# Patient Record
Sex: FEMALE | Race: WHITE | NOT HISPANIC OR LATINO | Employment: OTHER | URBAN - METROPOLITAN AREA
[De-identification: names, ages, dates, MRNs, and addresses within clinical notes are randomized per-mention and may not be internally consistent; named-entity substitution may affect disease eponyms.]

---

## 2017-01-30 ENCOUNTER — OFFICE VISIT (OUTPATIENT)
Dept: OPTOMETRY | Facility: CLINIC | Age: 72
End: 2017-01-30
Payer: COMMERCIAL

## 2017-01-30 DIAGNOSIS — H25.13 NUCLEAR SCLEROTIC CATARACT OF BOTH EYES: Primary | ICD-10-CM

## 2017-01-30 DIAGNOSIS — H52.203 ASTIGMATISM OF BOTH EYES: ICD-10-CM

## 2017-01-30 DIAGNOSIS — H52.4 PRESBYOPIA: ICD-10-CM

## 2017-01-30 DIAGNOSIS — H52.13 MYOPIA OF BOTH EYES: ICD-10-CM

## 2017-01-30 PROCEDURE — 92014 COMPRE OPH EXAM EST PT 1/>: CPT | Performed by: OPTOMETRIST

## 2017-01-30 PROCEDURE — 92015 DETERMINE REFRACTIVE STATE: CPT | Performed by: OPTOMETRIST

## 2017-01-30 ASSESSMENT — REFRACTION_MANIFEST
OS_AXIS: 010
METHOD_AUTOREFRACTION: 1
OS_SPHERE: -14.25
OS_CYLINDER: +2.75
OD_AXIS: 010
OD_AXIS: 14
OD_CYLINDER: +2.00
OD_SPHERE: -13.50
OD_CYLINDER: +2.25
OD_ADD: +2.50
OS_ADD: +2.50
OD_SPHERE: -12.00
OS_AXIS: 9
OS_CYLINDER: +1.75
OS_SPHERE: -14.50

## 2017-01-30 ASSESSMENT — VISUAL ACUITY
CORRECTION_TYPE: GLASSES
OD_PH_CC: 20/100-1
OS_PH_CC: 20/80-1
OS_CC: 20/150
OD_CC: 20/200
METHOD: SNELLEN - LINEAR
OS_CC: 20/40-1
OD_CC: 20/40-1
OD_CC+: -1

## 2017-01-30 ASSESSMENT — REFRACTION_WEARINGRX
OS_ADD: +2.50
OD_SPHERE: -10.50
OS_CYLINDER: +2.75
OD_CYLINDER: +2.00
OD_AXIS: 007
SPECS_TYPE: PAL
OS_SPHERE: -13.75
OD_ADD: +2.50
OS_AXIS: 020

## 2017-01-30 ASSESSMENT — CUP TO DISC RATIO
OD_RATIO: 0.5
OS_RATIO: 0.2

## 2017-01-30 ASSESSMENT — KERATOMETRY
OS_K1POWER_DIOPTERS: 44.50
OD_K1POWER_DIOPTERS: 44.75
OS_AXISANGLE2_DEGREES: 179
OD_AXISANGLE2_DEGREES: 12
OD_K2POWER_DIOPTERS: 44.25
OS_K2POWER_DIOPTERS: 43.75

## 2017-01-30 ASSESSMENT — EXTERNAL EXAM - LEFT EYE: OS_EXAM: NORMAL

## 2017-01-30 ASSESSMENT — TONOMETRY
OS_IOP_MMHG: 20
OD_IOP_MMHG: 20
IOP_METHOD: APPLANATION

## 2017-01-30 ASSESSMENT — CONF VISUAL FIELD
OS_NORMAL: 1
OD_NORMAL: 1

## 2017-01-30 ASSESSMENT — SLIT LAMP EXAM - LIDS
COMMENTS: NORMAL
COMMENTS: NORMAL

## 2017-01-30 ASSESSMENT — EXTERNAL EXAM - RIGHT EYE: OD_EXAM: NORMAL

## 2017-01-30 NOTE — PATIENT INSTRUCTIONS
Patient was advised of today's exam findings.  Poor vision due to cataracts  Refer to [facility] for cataract evaluation   Their office will call you to schedule appointment.   Please call [phone] if you have not heard from them within 1 week.    Emani Mosquera and Blake  [facility]  [address] [place] [address]. AUGIE Interiano [id number] [phone]    Sindhu Encinas O.D.  [facility]   [address] [place], [place] [address] [phone]

## 2017-01-30 NOTE — MR AVS SNAPSHOT
After Visit Summary   1/30/2017    Kelsea Valentine    MRN: [id number]           Patient Information     Date Of Birth          1945        Visit Information        Provider Department      1/30/2017 11:30 AM Sindhu Encinas OD [facility]        Today's Diagnoses     Presbyopia    -  1       Care Instructions    Patient was advised of today's exam findings.  Poor vision due to cataracts  Refer to [facility] for cataract evaluation   Their office will call you to schedule appointment.   Please call [phone] if you have not heard from them within 1 week.    Emani Mosquera and Blake  [facility]  [address]  [place], [place] [address] [phone]    Sindhu Encinas O.D.  [facility]   [address] [place], [place] [address] [phone]              Follow-ups after your visit        Your next 10 appointments already scheduled     Mar 21, 2017  8:00 AM   LAB with AN LAB   [facility] ([facility]    [address] [facility] [address] [phone]           Patient must bring picture ID.  Patient should be prepared to give a urine specimen  Please do not eat 10-12 hours before your appointment if you are coming in fasting for labs on lipids, cholesterol, or glucose (sugar).  Pregnant women should follow their Care Team instructions. Water with medications is okay. Do not drink coffee or other fluids.   If you have concerns about taking  your medications, please ask at office or if scheduling via [employer], send a message by clicking on Secure Messaging, Message Your Care Team.            Mar 28, 2017 10:15 AM   SHORT with Maritza Lopez MD   [facility]    [address] [facility] [address] [phone]              Who to contact     If you have questions or need follow up information about today's clinic visit or your  "schedule please contact [facility] directly at [phone].  Normal or non-critical lab and imaging results will be communicated to you by MyChart, letter or phone within 4 business days after the clinic has received the results. If you do not hear from us within 7 days, please contact the clinic through [employer] or phone. If you have a critical or abnormal lab result, we will notify you by phone as soon as possible.  Submit refill requests through [employer] or call your pharmacy and they will forward the refill request to us. Please allow 3 business days for your refill to be completed.          Additional Information About Your Visit        [employer] Information     [employer] lets you send messages to your doctor, view your test results, renew your prescriptions, schedule appointments and more. To sign up, go to [email or web address] . Click on \"Log in\" on the left side of the screen, which will take you to the Welcome page. Then click on \"Sign up Now\" on the right side of the page.     You will be asked to enter the access code listed below, as well as some personal information. Please follow the directions to create your username and password.     Your access code is: [email or web address]  Expires: 2017 12:48 PM     Your access code will  in 90 days. If you need help or a new code, please call your [facility] clinic or [phone].        Care EveryWhere ID     This is your Care EveryWhere ID. This could be used by other organizations to access your [facility] medical records  [id number]         Blood Pressure from Last 3 Encounters:   16 138/76   16 130/75   09/30/15 130/78    Weight from Last 3 Encounters:   16 75.751 kg (167 lb)   16 79.833 kg (176 lb)   09/30/15 81.194 kg (179 lb)              Today, you had the following     No orders found for display       Primary Care Provider Office Phone # Fax #    Maritza Lopez [phone]       [facility] [facility] [address] [facility] [address]        Thank you!     Thank you for choosing [facility]  for your care. Our goal is always to provide you with excellent care. Hearing back from our patients is one way we can continue to improve our services. Please take a few minutes to complete the written survey that you may receive in the mail after your visit with us. Thank you!             Your Updated Medication List - Protect others around you: Learn how to safely use, store and throw away your medicines at www.disposemymeds.org.          This list is accurate as of: 1/30/17 12:48 PM.  Always use your most recent med list.                   Brand Name Dispense Instructions for use    albuterol 108 (90 BASE) MCG/ACT Inhaler    PROAIR HFA/PROVENTIL HFA/VENTOLIN HFA    1 Inhaler    Inhale 2 puffs into the lungs every 6 hours       amLODIPine 5 MG tablet    NORVASC    90 tablet    Take 1 tablet (5 mg) by mouth daily       atorvastatin 20 MG tablet    LIPITOR    90 tablet    Take 1 tablet (20 mg) by mouth daily       benzonatate 200 MG capsule    TESSALON    90 capsule    Take 1 capsule (200 mg) by mouth 3 times daily as needed for cough       CALCIUM 600/VITAMIN D PO      1 everyday       CLARITIN 10 MG tablet   Generic drug:  loratadine      1 TABLET DAILY       FISH OIL PO      one everyday       GLUCOSAMINE 1500 COMPLEX PO      one everyday       irbesartan 300 MG tablet    AVAPRO    90 tablet    Take 1 tablet (300 mg) by mouth daily       metoprolol 100 MG 24 hr tablet    TOPROL-XL    90 tablet    Take 1 tablet (100 mg) by mouth daily       omeprazole 20 MG tablet     30 tablet    Take 1 tablet by mouth daily. Take 30-60 minutes before a meal.       ONE-A-DAY WEIGHT SMART ADVANCE PO      1 every other day       TYLENOL CAPS 500 MG OR      1 CAPSULE EVERY 4 HOURS AS NEEDED       umeclidinium 62.5 MCG/INH oral inhaler    INCRUSE ELLIPTA    90 each    Inhale 1 puff (62.5 mcg) into the lungs  daily       VITAMIN C PO      1 everyday       vitamin D 1000 UNITS capsule      Take 1 capsule by mouth daily.

## 2017-01-30 NOTE — PROGRESS NOTES
Chief Complaint   Patient presents with     COMPREHENSIVE EYE EXAM     wants DMV form filled out      Accompanied by . Also has DMV Forms to be filled out    Last Eye Exam: 2 years ago at [facility]  Dilated Previously: Yes    What are you currently using to see?  Glasses, wears glasses all of the time        Distance Vision Acuity: No changes, does fine with glasses, also mentioned that recently she's noticed a halo around lighting     Near Vision Acuity: Satisfied with vision while reading and using computer with glasses    Eye Comfort: good  Do you use eye drops? : No, mentioned that she is allergic to some of them.   Occupation or Hobbies: Retired     [employer] Assistant          Medical, surgical and family histories reviewed and updated 1/30/2017.       OBJECTIVE: See Ophthalmology exam    ASSESSMENT:    ICD-10-CM    1. Nuclear sclerotic cataract of both eyes H25.13 EYE EXAM (SIMPLE-NONBILLABLE)     REFRACTIVE STATUS     OPHTHALMOLOGY ADULT REFERRAL     OPHTHALMOLOGY ADULT REFERRAL   2. Presbyopia H52.4 EYE EXAM (SIMPLE-NONBILLABLE)     REFRACTIVE STATUS   3. Myopia of both eyes H52.13 EYE EXAM (SIMPLE-NONBILLABLE)     REFRACTIVE STATUS   4. Astigmatism of both eyes H52.203 EYE EXAM (SIMPLE-NONBILLABLE)     REFRACTIVE STATUS      PLAN:     Patient Instructions   Patient was advised of today's exam findings.  Poor vision due to cataracts  Refer to [facility] [place] for cataract evaluation   Their office will call you to schedule appointment.   Please call [phone] if you have not heard from them within 1 week.    Emani Mosquera and Blake  [facility]  [address] [place] [address] [phone]    Sindhu Encinas O.D.  [place] [address] [place] [address] [phone]

## 2017-02-22 ENCOUNTER — OFFICE VISIT (OUTPATIENT)
Dept: OPHTHALMOLOGY | Facility: CLINIC | Age: 72
End: 2017-02-22
Payer: COMMERCIAL

## 2017-02-22 DIAGNOSIS — H26.9 CATARACT: Primary | ICD-10-CM

## 2017-02-22 PROCEDURE — 92014 COMPRE OPH EXAM EST PT 1/>: CPT | Performed by: OPHTHALMOLOGY

## 2017-02-22 ASSESSMENT — VISUAL ACUITY
OD_CC: 20/200
METHOD: SNELLEN - LINEAR
OS_CC: 20/200

## 2017-02-22 ASSESSMENT — TONOMETRY
OD_IOP_MMHG: 18
OS_IOP_MMHG: 18
IOP_METHOD: TONOPEN

## 2017-02-22 ASSESSMENT — EXTERNAL EXAM - LEFT EYE: OS_EXAM: GOOD ORBIT, PROLAPSED FAT PADS: UPPER, LOWER

## 2017-02-22 ASSESSMENT — CUP TO DISC RATIO
OS_RATIO: 0.2
OD_RATIO: 0.5

## 2017-02-22 ASSESSMENT — REFRACTION_WEARINGRX
OD_ADD: +2.50
OS_CYLINDER: +2.75
OS_ADD: +2.50
OS_SPHERE: -13.75
SPECS_TYPE: PAL
OD_CYLINDER: +2.00
OD_AXIS: 007
OD_SPHERE: -10.50
OS_AXIS: 020

## 2017-02-22 ASSESSMENT — REFRACTION_MANIFEST
OD_AXIS: 010
OS_SPHERE: -14.50
OS_CYLINDER: +2.75
OS_AXIS: 010
OD_SPHERE: -12.00
OD_CYLINDER: +2.00

## 2017-02-22 ASSESSMENT — EXTERNAL EXAM - RIGHT EYE: OD_EXAM: GOOD ORBIT, PROLAPSED FAT PADS: UPPER, LOWER

## 2017-02-22 NOTE — PROGRESS NOTES
Current Eye Medications:  no     Subjective:  Cataract evaluation referred by Dr. Encinas.  Pt reports her vision has been getting gradually blurrier.    GFT.  RH.  Left eye dominant (confirmed in office).     Objective:  See Ophthalmology Exam.       Assessment:  Visually significant cataract both eyes.      Plan: Will proceed with cataract surgery left eye first; likely right eye thereafter.  See Patient Instructions.

## 2017-02-22 NOTE — MR AVS SNAPSHOT
After Visit Summary   2/22/2017    Kelsea Valentine    MRN: [id number]           Patient Information     Date Of Birth          1945        Visit Information        Provider Department      2/22/2017 3:45 PM Davi Mosquera MD [facility]        Today's Diagnoses     Cataract, mod, ou    -  1      Care Instructions    We will call to schedule cataract surgery.  Davi Mosquera M.D.          Follow-ups after your visit        Your next 10 appointments already scheduled     Feb 28, 2017  2:45 PM CST   [facility]    [address] [address] [address]   [phone]            Mar 01, 2017  8:55 AM CST   Pre-Op physical with Maritza Lopez MD   [facility]    [address] [facility] [address]   [phone]            Mar 07, 2017  7:45 AM CST   Return Visit with Davi Mosquera MD   [facility] ([facility])    [address] [place] [address]   [phone]            Mar 14, 2017  3:45 PM CDT   Return Visit with Davi Mosquera MD   [facility] ([facility])    [address] [place] [address]   [phone]            Mar 21, 2017  8:00 AM CDT   LAB with AN LAB   [facility]    [address] [facility] [address]   [phone]           Patient must bring picture ID.  Patient should be prepared to give a urine specimen  Please do not eat 10-12 hours before your appointment if you are coming in fasting for labs on lipids, cholesterol, or glucose (sugar).  Pregnant women should follow their Care Team instructions. Water with medications is okay. Do not drink coffee or other fluids.   If you have concerns about taking  your medications, please ask at office or if scheduling via [employer], send a message by clicking on  "Secure Messaging, Message Your Care Team.            Mar 28, 2017 10:15 AM CDT   SHORT with [facility]za Lopez MD   [facility]    [address] [facility] [address] [phone]            2017  9:15 AM CDT   Return Visit with Davi Mosquera MD   [facility]    [address] [facility] [address] [address] [phone]              Who to contact     If you have questions or need follow up information about today's clinic visit or your schedule please contact [facility] directly at [phone].  Normal or non-critical lab and imaging results will be communicated to you by MyChart, letter or phone within 4 business days after the clinic has received the results. If you do not hear from us within 7 days, please contact the clinic through MyChart or phone. If you have a critical or abnormal lab result, we will notify you by phone as soon as possible.  Submit refill requests through [employer] or call your pharmacy and they will forward the refill request to us. Please allow 3 business days for your refill to be completed.          Additional Information About Your Visit        [employer] Information     [employer] lets you send messages to your doctor, view your test results, renew your prescriptions, schedule appointments and more. To sign up, go to [email or web address] . Click on \"Log in\" on the left side of the screen, which will take you to the Welcome page. Then click on \"Sign up Now\" on the right side of the page.     You will be asked to enter the access code listed below, as well as some personal information. Please follow the directions to create your username and password.     Your access code is: [email or web address]  Expires: 2017 12:48 PM     Your access code will  in 90 days. If you need help or a new code, please call your [facility] clinic or [phone].        Care EveryWhere ID     This " is your Care EveryWhere ID. This could be used by other organizations to access your [facility] medical records  [id number]         Blood Pressure from Last 3 Encounters:   09/27/16 138/76   03/29/16 130/75   09/30/15 130/78    Weight from Last 3 Encounters:   09/27/16 75.8 kg (167 lb)   03/29/16 79.8 kg (176 lb)   09/30/15 81.2 kg (179 lb)              Today, you had the following     No orders found for display       Primary Care Provider Office Phone # Fax #    Maritza Belkys Lopez [phone]       [facility] [address] [facility] [address]        Thank you!     Thank you for choosing [facility]  for your care. Our goal is always to provide you with excellent care. Hearing back from our patients is one way we can continue to improve our services. Please take a few minutes to complete the written survey that you may receive in the mail after your visit with us. Thank you!             Your Updated Medication List - Protect others around you: Learn how to safely use, store and throw away your medicines at www.disposemymeds.org.          This list is accurate as of: 2/22/17 11:59 PM.  Always use your most recent med list.                   Brand Name Dispense Instructions for use    albuterol 108 (90 BASE) MCG/ACT Inhaler    PROAIR HFA/PROVENTIL HFA/VENTOLIN HFA    1 Inhaler    Inhale 2 puffs into the lungs every 6 hours       amLODIPine 5 MG tablet    NORVASC    90 tablet    Take 1 tablet (5 mg) by mouth daily       atorvastatin 20 MG tablet    LIPITOR    90 tablet    Take 1 tablet (20 mg) by mouth daily       benzonatate 200 MG capsule    TESSALON    90 capsule    Take 1 capsule (200 mg) by mouth 3 times daily as needed for cough       CALCIUM 600/VITAMIN D PO      1 everyday       CLARITIN 10 MG tablet   Generic drug:  loratadine      1 TABLET DAILY       FISH OIL PO      one everyday       GLUCOSAMINE 1500 COMPLEX PO      one everyday       irbesartan 300 MG  tablet    AVAPRO    90 tablet    Take 1 tablet (300 mg) by mouth daily       metoprolol 100 MG 24 hr tablet    TOPROL-XL    90 tablet    Take 1 tablet (100 mg) by mouth daily       omeprazole 20 MG tablet     30 tablet    Take 1 tablet by mouth daily. Take 30-60 minutes before a meal.       ONE-A-DAY WEIGHT SMART ADVANCE PO      1 every other day       TYLENOL CAPS 500 MG OR      1 CAPSULE EVERY 4 HOURS AS NEEDED       umeclidinium 62.5 MCG/INH oral inhaler    INCRUSE ELLIPTA    90 each    Inhale 1 puff (62.5 mcg) into the lungs daily       VITAMIN C PO      1 everyday       vitamin D 1000 UNITS capsule      Take 1 capsule by mouth daily.

## 2017-02-24 PROBLEM — H26.9 CATARACT: Status: ACTIVE | Noted: 2017-02-24

## 2017-02-24 NOTE — PROGRESS NOTES
[facility]  [address] [facility] [address] [phone]  Dept: [phone]    PRE-OP EVALUATION:  Today's date: 3/1/2017    Kelsea Valentine (: 1945) presents for pre-operative evaluation assessment as requested by Dr. cummings.  She requires evaluation and anesthesia risk assessment prior to undergoing surgery/procedure for treatment of cataracts bilateral .  Proposed procedure: near sided    Date of Surgery/ Procedure: 17, right eye to follow   Time of Surgery/ Procedure: [facility]/Surgical Facility: [facility]    Primary Physician: Maritza Lopez  Type of Anesthesia Anticipated: to be determined    Patient has a Health Care Directive or Living Will:  NO     1. NO - Do you have a history of heart attack, stroke, stent, bypass or surgery on an artery in the head, neck, heart or legs?  2. NO - Do you ever have any pain or discomfort in your chest?  3. NO - Do you have a history of  Heart Failure?  4. NO - Are you troubled by shortness of breath when: walking on the level, up a slight hill or at night?  5. NO - Do you currently have a cold, bronchitis or other respiratory infection?  6. NO - Do you have a cough, shortness of breath or wheezing?  7. NO - Do you sometimes get pains in the calves of your legs when you walk?  8. YES - Do you or anyone in your family have previous history of blood clots?DAD  9. NO - Do you or does anyone in your family have a serious bleeding problem such as prolonged bleeding following surgeries or cuts?  10. YES - Have you ever had problems with anemia or been told to take iron pills? FRAN TIME AGO  11. NO - Have you had any abnormal blood loss such as black, tarry or bloody stools, or abnormal vaginal bleeding?  12. NO - Have you ever had a blood transfusion?  13. NO - Have you or any of your relatives ever had problems with anesthesia?  14. NO - Do you have sleep apnea, excessive snoring or daytime drowsiness?  15. NO - Do  you have any prosthetic heart valves?  16. NO - Do you have prosthetic joints?  17. NO - Is there any chance that you may be pregnant?                HPI:                                                      Brief HPI related to upcoming procedure: visually impairing cataracts requiring removal.      HYPERTENSIVE HYPERTROPHIC CARDIOMYOPATHY - stable, no symptoms   HYPERTENSION - Patient has longstanding history of mod-severe HTN , currently denies any symptoms referable to elevated blood pressure. Specifically denies chest pain, palpitations, dyspnea, orthopnea, PND or peripheral edema. Blood pressure readings have been in normal range. Current medication regimen is as listed below. Patient denies any side effects of medication.                                                                                                                                                                                          .  COPD - Patient has a longstanding history of moderate-severe COPD . Patient has been doing well overall noting PEACOCK and COUGH and continues on medication regimen consisting of Incruse without adverse reactions or side effects.                                                                                                         .    MEDICAL HISTORY:                                                      Patient Active Problem List    Diagnosis Date Noted     Cataract, mod, ou 02/24/2017     Priority: Medium     Advanced directives, counseling/discussion 09/30/2015     Priority: Medium     Discussed Advance Directive planning with patient; information given to patient to review.  Dafne Francois CMA           Abnormal glucose 03/31/2015     Priority: Medium     Problem list name updated by automated process. Provider to review       Vision loss 11/01/2014     Priority: Medium     COPD (chronic obstructive pulmonary disease) (H) 03/24/2014     Priority: Medium     GERD (gastroesophageal reflux disease)  08/17/2011     Priority: Medium     CKD (chronic kidney disease) stage 3, GFR 30-59 ml/min 08/12/2011     Priority: Medium     Hypertension goal BP (blood pressure) < 140/90 08/02/2011     Priority: Medium     Hypertensive hypertrophic cardiomyopathy (H) 12/11/2009     Priority: Medium     Mitral valve insufficiency 12/11/2009     Priority: Medium     Hyperlipidemia LDL goal <100 11/30/2009     Priority: Medium      Past Medical History   Diagnosis Date     Cardiomyopathy, hypertrophic obstructive (H)      CHF (congestive heart failure) (H)      High myopia, both eyes      HTN (hypertension)      Nonsenile cataract      Other primary cardiomyopathies      Pure hypercholesterolemia      Raynaud's syndrome      Past Surgical History   Procedure Laterality Date     Cholecystectomy, open       Non-surgical septal reduction therapy w/ coronary arteriograms, w/wo temporary pacemaker       Hc tooth extraction w/forcep       false teeth     Surgical history of -   04/2010     mitral valvuloplasty [place]     Colonoscopy N/A 7/27/2015     Procedure: COLONOSCOPY;  Surgeon: Dilip Quintero MD;  Location: [facility] OR     Colonoscopy with co2 insufflation N/A 7/27/2015     Procedure: COLONOSCOPY WITH CO2 INSUFFLATION;  Surgeon: Dilip Quintero MD;  Location: [facility] OR     Current Outpatient Prescriptions   Medication Sig Dispense Refill     [START ON 3/5/2017] moxifloxacin (VIGAMOX) 0.5 % ophthalmic solution Place 1 drop Into the left eye 3 times daily 1 Bottle 0     [START ON 3/7/2017] diclofenac (VOLTAREN) 0.1 % ophthalmic solution Place 1 drop Into the left eye 3 times daily 5 mL 0     [START ON 3/7/2017] prednisoLONE acetate (PRED FORTE) 1 % ophthalmic susp Place 1 drop Into the left eye 3 times daily 5 mL 0     umeclidinium (INCRUSE ELLIPTA) 62.5 MCG/INH oral inhaler Inhale 1 puff (62.5 mcg) into the lungs daily 90 each 1     albuterol (PROAIR HFA, PROVENTIL HFA, VENTOLIN HFA) 108 (90 BASE) MCG/ACT inhaler Inhale 2 puffs  "into the lungs every 6 hours 1 Inhaler 5     metoprolol (TOPROL-XL) 100 MG 24 hr tablet Take 1 tablet (100 mg) by mouth daily 90 tablet 1     amLODIPine (NORVASC) 5 MG tablet Take 1 tablet (5 mg) by mouth daily 90 tablet 1     irbesartan (AVAPRO) 300 MG tablet Take 1 tablet (300 mg) by mouth daily 90 tablet 1     atorvastatin (LIPITOR) 20 MG tablet Take 1 tablet (20 mg) by mouth daily 90 tablet 3     benzonatate (TESSALON) 200 MG capsule Take 1 capsule (200 mg) by mouth 3 times daily as needed for cough 90 capsule 3     omeprazole 20 MG tablet Take 1 tablet by mouth daily. Take 30-60 minutes before a meal. 30 tablet 1     Cholecalciferol (VITAMIN D) 1000 UNIT capsule Take 1 capsule by mouth daily.       CLARITIN 10 MG OR TABS 1 TABLET DAILY       TYLENOL CAPS 500 MG OR 1 CAPSULE EVERY 4 HOURS AS NEEDED       CALCIUM 600/VITAMIN D OR 1 everyday       VITAMIN C OR 1 everyday       ONE-A-DAY WEIGHT SMART ADVANCE OR 1 every other day       GLUCOSAMINE 1500 COMPLEX OR one everyday       FISH OIL OR one everyday       OTC products: None, except as noted above    Allergies   Allergen Reactions     Lisinopril Swelling     Difficulty swallowing.     Nickel Rash      Latex Allergy: NO    Social History   Substance Use Topics     Smoking status: Current Every Day Smoker     Packs/day: 0.50     Types: Cigarettes     Last attempt to quit: 3/15/2010     Smokeless tobacco: Never Used      Comment:  smokes     Alcohol use No     History   Drug Use No       REVIEW OF SYSTEMS:                                                    C: NEGATIVE for fever, chills, change in weight  E/M: NEGATIVE for ear, mouth and throat problems  R: NEGATIVE for significant cough or SOB  CV: NEGATIVE for chest pain, palpitations or peripheral edema    EXAM:                                                    /68  Pulse 65  Temp 97.1  F (36.2  C) (Oral)  Ht 5' 4\" (1.626 m)  Wt 164 lb (74.4 kg)  SpO2 98%  BMI 28.15 kg/m2  GENERAL APPEARANCE: " healthy, alert and no distress  HENT: ear canals and TM's normal and nose and mouth without ulcers or lesions  RESP: lungs clear to auscultation - no rales, rhonchi or wheezes  CV: regular rate and rhythm, normal S1 S2, no S3 or S4 and no murmur, click or rub   ABDOMEN: soft, nontender, no HSM or masses and bowel sounds normal  NEURO: Normal strength and tone, sensory exam grossly normal, mentation intact and speech normal    DIAGNOSTICS:                                                    EKG: appears normal, NSR, + LVH, septal hypertrophy on echo, nonspecific ST-T changes, unchanged from previous tracings    Recent Labs   Lab Test  09/27/16   1155  09/20/16   0801  03/29/16   1150  03/22/16   0811   02/09/12   0736   HGB  14.4  17.2*   --    --    < >  13.8   PLT  305   --    --    --    --   271   NA   --   135   --   139   < >  139   POTASSIUM   --   4.4   --   5.0   < >  4.7   CR   --   1.04   --   1.03   < >  1.05*   A1C  6.1*   --   6.3*   --    < >   --     < > = values in this interval not displayed.        IMPRESSION:                                                    Reason for surgery/procedure:  visually impairing cataracts requiring removal.        The proposed surgical procedure is considered LOW risk.    REVISED CARDIAC RISK INDEX  The patient has the following serious cardiovascular risks for perioperative complications such as (MI, PE, VFib and 3  AV Block):  No serious cardiac risks  INTERPRETATION: 0 risks: Class I (very low risk - 0.4% complication rate)    The patient has the following additional risks for perioperative complications:  No identified additional risks      ICD-10-CM    1. Preop general physical exam Z01.818 EKG 12-lead complete w/read - Clinics   2. Cataract H26.9    3. Chronic obstructive pulmonary disease, unspecified COPD type (H) J44.9    4. Hypertensive hypertrophic cardiomyopathy, without heart failure (H) I11.9     I42.2    5. Hypertension goal BP (blood pressure) < 140/90  I10    6. CKD (chronic kidney disease) stage 3, GFR 30-59 ml/min N18.3        RECOMMENDATIONS:                                                          --Patient is to take all scheduled medications on the day of surgery.    APPROVAL GIVEN to proceed with proposed procedure, without further diagnostic evaluation       Signed Electronically by: Maritza Lopez MD    Copy of this evaluation report is provided to requesting physician.    [place] Preop Guidelines

## 2017-02-28 ENCOUNTER — OFFICE VISIT (OUTPATIENT)
Dept: OPHTHALMOLOGY | Facility: CLINIC | Age: 72
End: 2017-02-28
Payer: COMMERCIAL

## 2017-02-28 DIAGNOSIS — H26.9 CATARACT: Primary | ICD-10-CM

## 2017-02-28 PROCEDURE — 92136 OPHTHALMIC BIOMETRY: CPT | Mod: TC | Performed by: OPHTHALMOLOGY

## 2017-02-28 PROCEDURE — 92012 INTRM OPH EXAM EST PATIENT: CPT | Performed by: OPHTHALMOLOGY

## 2017-02-28 RX ORDER — DICLOFENAC SODIUM 1 MG/ML
1 SOLUTION/ DROPS OPHTHALMIC 3 TIMES DAILY
Qty: 5 ML | Refills: 0 | Status: SHIPPED | OUTPATIENT
Start: 2017-03-07 | End: 2017-03-14

## 2017-02-28 RX ORDER — PREDNISOLONE ACETATE 10 MG/ML
1 SUSPENSION/ DROPS OPHTHALMIC 3 TIMES DAILY
Qty: 5 ML | Refills: 0 | Status: SHIPPED | OUTPATIENT
Start: 2017-03-07 | End: 2017-03-15

## 2017-02-28 RX ORDER — MOXIFLOXACIN 5 MG/ML
1 SOLUTION/ DROPS OPHTHALMIC 3 TIMES DAILY
Qty: 1 BOTTLE | Refills: 0 | Status: SHIPPED | OUTPATIENT
Start: 2017-03-05 | End: 2018-07-31

## 2017-02-28 ASSESSMENT — VISUAL ACUITY
METHOD: SNELLEN - LINEAR
OD_CC: 20/200
CORRECTION_TYPE: GLASSES
OS_CC: 20/80

## 2017-02-28 ASSESSMENT — EXTERNAL EXAM - RIGHT EYE: OD_EXAM: GOOD ORBIT, PROLAPSED FAT PADS: UPPER, LOWER

## 2017-02-28 ASSESSMENT — EXTERNAL EXAM - LEFT EYE: OS_EXAM: GOOD ORBIT, PROLAPSED FAT PADS: UPPER, LOWER

## 2017-02-28 NOTE — MR AVS SNAPSHOT
After Visit Summary   2/28/2017    Kelsea Valentine    MRN: [id number]           Patient Information     Date Of Birth          1945        Visit Information        Provider Department      2/28/2017 2:45 PM Davi Mosquera MD [facility]        Care Instructions    PRE-OP CATARACT INSTRUCTIONS    *You will be picking up 3 eye drop bottles from your pharmacy.    *Use the following drops in the left eye  3 times a day the day before surgery and once the morning of surgery:                                    Vigamox (tan cap)    *If taking more than one drop, wait five minutes between drops.    *No liquids or solid food after midnight.    *If you are taking glaucoma drops, continue as usual.    Davi Mosquera M.D.          Follow-ups after your visit        Your next 10 appointments already scheduled     Mar 01, 2017  8:55 AM CST   [facility]    [address] [facility] [address]   [phone]            Mar 07, 2017  7:45 AM CST   Return Visit with Davi Mosquera MD   [facility] ([facility])    [address] [place] [address]   [phone]            Mar 14, 2017  3:45 PM CDT   Return Visit with Davi Mosquera MD   [facility] ([address] [address]   [phone]            Mar 21, 2017  8:00 AM CDT   LAB with AN LAB   [facility]    [address] [facility] [address]   [phone]           Patient must bring picture ID.  Patient should be prepared to give a urine specimen  Please do not eat 10-12 hours before your appointment if you are coming in fasting for labs on lipids, cholesterol, or glucose (sugar).  Pregnant women should follow their Care Team instructions. Water with medications is okay. Do not drink coffee or other  "fluids.   If you have concerns about taking  your medications, please ask at office or if scheduling via [employer], send a message by clicking on Secure Messaging, Message Your Care Team.            Mar 28, 2017 10:15 AM CDT   SHORT with Maritza Lopez MD   [facility]    [address] [facility] [address]   [phone]            Apr 14, 2017  9:15 AM CDT   Return Visit with Davi Mosquera MD   [facility]    [address] [facility] [address] [phone]              Who to contact     If you have questions or need follow up information about today's clinic visit or your schedule please contact [facility] directly at [phone].  Normal or non-critical lab and imaging results will be communicated to you by [employer], letter or phone within 4 business days after the clinic has received the results. If you do not hear from us within 7 days, please contact the clinic through [employer] or phone. If you have a critical or abnormal lab result, we will notify you by phone as soon as possible.  Submit refill requests through [employer] or call your pharmacy and they will forward the refill request to us. Please allow 3 business days for your refill to be completed.          Additional Information About Your Visit        [employer] Information     [employer] lets you send messages to your doctor, view your test results, renew your prescriptions, schedule appointments and more. To sign up, go to [email or web address] . Click on \"Log in\" on the left side of the screen, which will take you to the Welcome page. Then click on \"Sign up Now\" on the right side of the page.     You will be asked to enter the access code listed below, as well as some personal information. Please follow the directions to create your username and password.     Your access code is: [email or web address]  Expires: 4/30/2017 12:48 PM     Your access " code will  in 90 days. If you need help or a new code, please call your [place] clinic or [phone].        Care EveryWhere ID     This is your Care EveryWhere ID. This could be used by other organizations to access your [place] medical records  [id number]         Blood Pressure from Last 3 Encounters:   16 138/76   16 130/75   09/30/15 130/78    Weight from Last 3 Encounters:   16 75.8 kg (167 lb)   16 79.8 kg (176 lb)   09/30/15 81.2 kg (179 lb)              Today, you had the following     No orders found for display       Primary Care Provider Office Phone # Fax #    Maritza Lopez [phone]       [facility] [address] [facility] [address]        Thank you!     Thank you for choosing [facility]  for your care. Our goal is always to provide you with excellent care. Hearing back from our patients is one way we can continue to improve our services. Please take a few minutes to complete the written survey that you may receive in the mail after your visit with us. Thank you!             Your Updated Medication List - Protect others around you: Learn how to safely use, store and throw away your medicines at www.disposemymeds.org.          This list is accurate as of: 17  3:38 PM.  Always use your most recent med list.                   Brand Name Dispense Instructions for use    albuterol 108 (90 BASE) MCG/ACT Inhaler    PROAIR HFA/PROVENTIL HFA/VENTOLIN HFA    1 Inhaler    Inhale 2 puffs into the lungs every 6 hours       amLODIPine 5 MG tablet    NORVASC    90 tablet    Take 1 tablet (5 mg) by mouth daily       atorvastatin 20 MG tablet    LIPITOR    90 tablet    Take 1 tablet (20 mg) by mouth daily       benzonatate 200 MG capsule    TESSALON    90 capsule    Take 1 capsule (200 mg) by mouth 3 times daily as needed for cough       CALCIUM 600/VITAMIN D PO      1 everyday       CLARITIN 10 MG tablet   Generic drug:   loratadine      1 TABLET DAILY       FISH OIL PO      one everyday       GLUCOSAMINE 1500 COMPLEX PO      one everyday       irbesartan 300 MG tablet    AVAPRO    90 tablet    Take 1 tablet (300 mg) by mouth daily       metoprolol 100 MG 24 hr tablet    TOPROL-XL    90 tablet    Take 1 tablet (100 mg) by mouth daily       omeprazole 20 MG tablet     30 tablet    Take 1 tablet by mouth daily. Take 30-60 minutes before a meal.       ONE-A-DAY WEIGHT SMART ADVANCE PO      1 every other day       TYLENOL CAPS 500 MG OR      1 CAPSULE EVERY 4 HOURS AS NEEDED       umeclidinium 62.5 MCG/INH oral inhaler    INCRUSE ELLIPTA    90 each    Inhale 1 puff (62.5 mcg) into the lungs daily       VITAMIN C PO      1 everyday       vitamin D 1000 UNITS capsule      Take 1 capsule by mouth daily.

## 2017-02-28 NOTE — PROGRESS NOTES
Current Eye Medications:       Subjective:  Patient here for pre-op cataract surgery, left eye , scheduled for 3-6-17.  History and Physical tomorrow.  Consent signed.      Objective:  See Ophthalmology Exam.       Assessment: Visually significant cataract left eye.       Plan: Plan [place] phacoemulsification/ posterior chamber lens left eye local standby.  Risks, benefits, complications, and alternatives discussed with patient including possibility of limitations from coexistent eye disease and loss of vision.  Target refraction and multifocal lens options discussed.  Patient understands and consents.  Davi Mosquera M.D.

## 2017-02-28 NOTE — PATIENT INSTRUCTIONS
PRE-OP CATARACT INSTRUCTIONS    *You will be picking up 3 eye drop bottles from your pharmacy.    *Use the following drops in the left eye  3 times a day the day before surgery and once the morning of surgery:                                    Vigamox (tan cap)    *If taking more than one drop, wait five minutes between drops.    *No liquids or solid food after midnight.    *If you are taking glaucoma drops, continue as usual.    Davi Mosquera M.D.

## 2017-03-01 ENCOUNTER — OFFICE VISIT (OUTPATIENT)
Dept: FAMILY MEDICINE | Facility: CLINIC | Age: 72
End: 2017-03-01
Payer: COMMERCIAL

## 2017-03-01 VITALS
DIASTOLIC BLOOD PRESSURE: 68 MMHG | BODY MASS INDEX: 28 KG/M2 | TEMPERATURE: 97.1 F | WEIGHT: 164 LBS | HEIGHT: 64 IN | OXYGEN SATURATION: 98 % | SYSTOLIC BLOOD PRESSURE: 136 MMHG | HEART RATE: 65 BPM

## 2017-03-01 DIAGNOSIS — I11.9 HYPERTENSIVE HYPERTROPHIC CARDIOMYOPATHY, WITHOUT HEART FAILURE (H): ICD-10-CM

## 2017-03-01 DIAGNOSIS — I10 HYPERTENSION GOAL BP (BLOOD PRESSURE) < 140/90: ICD-10-CM

## 2017-03-01 DIAGNOSIS — J44.9 CHRONIC OBSTRUCTIVE PULMONARY DISEASE, UNSPECIFIED COPD TYPE (H): ICD-10-CM

## 2017-03-01 DIAGNOSIS — N18.30 CKD (CHRONIC KIDNEY DISEASE) STAGE 3, GFR 30-59 ML/MIN (H): ICD-10-CM

## 2017-03-01 DIAGNOSIS — I42.2 HYPERTENSIVE HYPERTROPHIC CARDIOMYOPATHY, WITHOUT HEART FAILURE (H): ICD-10-CM

## 2017-03-01 DIAGNOSIS — H26.9 CATARACT: ICD-10-CM

## 2017-03-01 DIAGNOSIS — Z01.818 PREOP GENERAL PHYSICAL EXAM: Primary | ICD-10-CM

## 2017-03-01 PROCEDURE — 99214 OFFICE O/P EST MOD 30 MIN: CPT | Performed by: FAMILY MEDICINE

## 2017-03-01 PROCEDURE — 93000 ELECTROCARDIOGRAM COMPLETE: CPT | Performed by: FAMILY MEDICINE

## 2017-03-01 NOTE — MR AVS SNAPSHOT
After Visit Summary   3/1/2017    Kelsea Valentine    MRN: [id number]           Patient Information     Date Of Birth          1945        Visit Information        Provider Department      3/1/2017 8:55 AM Maritza Lopez MD [facility]        Today's Diagnoses     Preop general physical exam    -  1    Cataract        Chronic obstructive pulmonary disease, unspecified COPD type (H)        Hypertensive hypertrophic cardiomyopathy, without heart failure (H)        Hypertension goal BP (blood pressure) < 140/90        CKD (chronic kidney disease) stage 3, GFR 30-59 ml/min          Care Instructions      Before Your Surgery      Call your surgeon if there is any change in your health. This includes signs of a cold or flu (such as a sore throat, runny nose, cough, rash or fever).    Do not smoke, drink alcohol or take over the counter medicine (unless your surgeon or primary care doctor tells you to) for the 24 hours before and after surgery.    If you take prescribed drugs: Follow your doctor s orders about which medicines to take and which to stop until after surgery.    Eating and drinking prior to surgery: follow the instructions from your surgeon    Take a shower or bath the night before surgery. Use the soap your surgeon gave you to gently clean your skin. If you do not have soap from your surgeon, use your regular soap. Do not shave or scrub the surgery site.  Wear clean pajamas and have clean sheets on your bed.         Follow-ups after your visit        Your next 10 appointments already scheduled     Mar 07, 2017  7:45 AM CST   Return Visit with Davi Mosquera MD   [facility] ([facility]    [address] [facility]  [place] [address]   [phone]            Mar 14, 2017  3:45 PM CDT   Return Visit with Davi Mosquera MD   [facility] ([facility])    [address] [facility] [place] [address]   [phone]             Mar 21, 2017  8:00 AM CDT   LAB with AN LAB   [facility]    [address] [facility] [address] [phone]           Patient must bring picture ID.  Patient should be prepared to give a urine specimen  Please do not eat 10-12 hours before your appointment if you are coming in fasting for labs on lipids, cholesterol, or glucose (sugar).  Pregnant women should follow their Care Team instructions. Water with medications is okay. Do not drink coffee or other fluids.   If you have concerns about taking  your medications, please ask at office or if scheduling via [employer], send a message by clicking on Secure Messaging, Message Your Care Team.            Mar 28, 2017 10:15 AM CDT   SHORT with Maritza Lopez MD   [facility]    [address] [facility] [facility] [address] [phone]            Apr 14, 2017  9:15 AM CDT   Return Visit with Davi Mosquera MD   [facility] ([facility])    [address] [place] [address] [phone]              Who to contact     If you have questions or need follow up information about today's clinic visit or your schedule please contact [facility] directly at [phone].  Normal or non-critical lab and imaging results will be communicated to you by [employer]hart, letter or phone within 4 business days after the clinic has received the results. If you do not hear from us within 7 days, please contact the clinic through [employer]hart or phone. If you have a critical or abnormal lab result, we will notify you by phone as soon as possible.  Submit refill requests through [employer] or call your pharmacy and they will forward the refill request to us. Please allow 3 business days for your refill to be completed.          Additional Information About Your Visit        [employer] Information     [employer] lets you send messages to your doctor, view your  "test results, renew your prescriptions, schedule appointments and more. To sign up, go to [email or web address] . Click on \"Log in\" on the left side of the screen, which will take you to the Welcome page. Then click on \"Sign up Now\" on the right side of the page.     You will be asked to enter the access code listed below, as well as some personal information. Please follow the directions to create your username and password.     Your access code is: [email or web address]  Expires: 2017 12:48 PM     Your access code will  in 90 days. If you need help or a new code, please call your [place] clinic or [phone].        Care EveryWhere ID     This is your Care EveryWhere ID. This could be used by other organizations to access your [place] medical records  [id number]        Your Vitals Were     Pulse Temperature Height Pulse Oximetry BMI (Body Mass Index)       65 97.1  F (36.2  C) (Oral) 5' 4\" (1.626 m) 98% 28.15 kg/m2        Blood Pressure from Last 3 Encounters:   17 136/68   16 138/76   16 130/75    Weight from Last 3 Encounters:   17 164 lb (74.4 kg)   16 167 lb (75.8 kg)   16 176 lb (79.8 kg)              We Performed the Following     EKG 12-lead complete w/read - Clinics        Primary Care Provider Office Phone # Fax #    Maritza Lopez [phone]       [facility] [address] [facility] [address]        Thank you!     Thank you for choosing [facility]  for your care. Our goal is always to provide you with excellent care. Hearing back from our patients is one way we can continue to improve our services. Please take a few minutes to complete the written survey that you may receive in the mail after your visit with us. Thank you!             Your Updated Medication List - Protect others around you: Learn how to safely use, store and throw away your medicines at www.disposemymeds.org.          This list is " accurate as of: 3/1/17  9:25 AM.  Always use your most recent med list.                   Brand Name Dispense Instructions for use    albuterol 108 (90 BASE) MCG/ACT Inhaler    PROAIR HFA/PROVENTIL HFA/VENTOLIN HFA    1 Inhaler    Inhale 2 puffs into the lungs every 6 hours       amLODIPine 5 MG tablet    NORVASC    90 tablet    Take 1 tablet (5 mg) by mouth daily       atorvastatin 20 MG tablet    LIPITOR    90 tablet    Take 1 tablet (20 mg) by mouth daily       benzonatate 200 MG capsule    TESSALON    90 capsule    Take 1 capsule (200 mg) by mouth 3 times daily as needed for cough       CALCIUM 600/VITAMIN D PO      1 everyday       CLARITIN 10 MG tablet   Generic drug:  loratadine      1 TABLET DAILY       diclofenac 0.1 % ophthalmic solution   Start taking on:  3/7/2017    VOLTAREN    5 mL    Place 1 drop Into the left eye 3 times daily       FISH OIL PO      one everyday       GLUCOSAMINE 1500 COMPLEX PO      one everyday       irbesartan 300 MG tablet    AVAPRO    90 tablet    Take 1 tablet (300 mg) by mouth daily       metoprolol 100 MG 24 hr tablet    TOPROL-XL    90 tablet    Take 1 tablet (100 mg) by mouth daily       moxifloxacin 0.5 % ophthalmic solution   Start taking on:  3/5/2017    VIGAMOX    1 Bottle    Place 1 drop Into the left eye 3 times daily       omeprazole 20 MG tablet     30 tablet    Take 1 tablet by mouth daily. Take 30-60 minutes before a meal.       ONE-A-DAY WEIGHT SMART ADVANCE PO      1 every other day       prednisoLONE acetate 1 % ophthalmic susp   Start taking on:  3/7/2017    PRED FORTE    5 mL    Place 1 drop Into the left eye 3 times daily       TYLENOL CAPS 500 MG OR      1 CAPSULE EVERY 4 HOURS AS NEEDED       umeclidinium 62.5 MCG/INH oral inhaler    INCRUSE ELLIPTA    90 each    Inhale 1 puff (62.5 mcg) into the lungs daily       VITAMIN C PO      1 everyday       vitamin D 1000 UNITS capsule      Take 1 capsule by mouth daily.

## 2017-03-01 NOTE — NURSING NOTE
"Chief Complaint   Patient presents with     Pre-Op Exam       Initial /74  Pulse 65  Temp 97.1  F (36.2  C) (Oral)  Ht 5' 4\" (1.626 m)  Wt 164 lb (74.4 kg)  SpO2 98%  BMI 28.15 kg/m2 Estimated body mass index is 28.15 kg/(m^2) as calculated from the following:    Height as of this encounter: 5' 4\" (1.626 m).    Weight as of this encounter: 164 lb (74.4 kg).  Medication Reconciliation: complete  "

## 2017-03-07 ENCOUNTER — OFFICE VISIT (OUTPATIENT)
Dept: OPHTHALMOLOGY | Facility: CLINIC | Age: 72
End: 2017-03-07
Payer: COMMERCIAL

## 2017-03-07 DIAGNOSIS — Z96.1 PSEUDOPHAKIA: Primary | ICD-10-CM

## 2017-03-07 PROCEDURE — 99024 POSTOP FOLLOW-UP VISIT: CPT | Performed by: OPHTHALMOLOGY

## 2017-03-07 ASSESSMENT — SLIT LAMP EXAM - LIDS: COMMENTS: NORMAL

## 2017-03-07 ASSESSMENT — TONOMETRY
OS_IOP_MMHG: 19
IOP_METHOD: TONOPEN

## 2017-03-07 ASSESSMENT — VISUAL ACUITY
OS_PH_SC: 20/50
OS_SC+: -2
OS_SC: 20/70
METHOD: SNELLEN - LINEAR

## 2017-03-07 NOTE — PROGRESS NOTES
Current Eye Medications:       Subjective:  Status/Post Kelman Phacoemulsification with Posterior Chamber Lens, left eye:  3-6-17.  Slept well.  Comfortable, but she took some Tylenol for a headache.      Objective:  See Ophthalmology Exam.       Assessment: Doing well status/post Kelman phacoemulsification/ posterior chamber lens left eye.      Plan:   See Patient Instructions.

## 2017-03-07 NOTE — MR AVS SNAPSHOT
After Visit Summary   3/7/2017    Kelsea Valentine    MRN: [id number]           Patient Information     Date Of Birth          1945        Visit Information        Provider Department      3/7/2017 7:45 AM Davi Mosquera MD [facility]        Today's Diagnoses     Pseudophakia, os    -  1      Care Instructions    POST-OP CATARACT INSTRUCTIONS    Use the following drops in the left eye:    Vigamox (tan cap) 3 times a day through end of Saturday, then put remainder in fridge.    Continue:  Prednisolone (pink or white cap) 3 times a day  Diclofenac (gray cap) 3 times a day    ~Wait at least 5 minutes between drops.    ~Wear eye shield at night for one week.    ~Do not exert yourself to the point that you are red in the face for one week.    ~If your vision worsens, eye becomes increasingly red, or becomes painful, call [phone].    ~If you are taking glaucoma medications, continue as usual.    ~OK to resume aspirin and/or other blood thinners.    Davi Mosquera M.D.            Follow-ups after your visit        Your next 10 appointments already scheduled     Mar 14, 2017  3:45 PM CDT   Return Visit with Davi Mosquera MD   [facility] ([facility])    [address] [facility] [address] [phone]            Mar 21, 2017  8:00 AM CDT   LAB with AN LAB   [facility] ([facility])    [address] [facility] [address] [phone]           Patient must bring picture ID.  Patient should be prepared to give a urine specimen  Please do not eat 10-12 hours before your appointment if you are coming in fasting for labs on lipids, cholesterol, or glucose (sugar).  Pregnant women should follow their Care Team instructions. Water with medications is okay. Do not drink coffee or other fluids.   If you have concerns about taking  your medications, please ask at office or if scheduling via [facility], send a message by  "clicking on Secure Messaging, Message Your Care Team.            Mar 28, 2017 10:15 AM CDT   SHORT with Maritza Lopez MD   [facility]    [address] [place] [address] [phone]            2017  9:15 AM CDT   [facility] with [facility]    [address] [facility] [address] [phone]              Who to contact     If you have questions or need follow up information about today's clinic visit or your schedule please contact [facility] directly at [phone].  Normal or non-critical lab and imaging results will be communicated to you by MyChart, letter or phone within 4 business days after the clinic has received the results. If you do not hear from us within 7 days, please contact the clinic through MyChart or phone. If you have a critical or abnormal lab result, we will notify you by phone as soon as possible.  Submit refill requests through [employer] or call your pharmacy and they will forward the refill request to us. Please allow 3 business days for your refill to be completed.          Additional Information About Your Visit        [employer] Information     [employer] lets you send messages to your doctor, view your test results, renew your prescriptions, schedule appointments and more. To sign up, go to [email or web address] . Click on \"Log in\" on the left side of the screen, which will take you to the Welcome page. Then click on \"Sign up Now\" on the right side of the page.     You will be asked to enter the access code listed below, as well as some personal information. Please follow the directions to create your username and password.     Your access code is: [email or web address]  Expires: 2017 12:48 PM     Your access code will  in 90 days. If you need help or a new code, please call your [facility] or [phone].        Care EveryWhere " ID     This is your Care EveryWhere ID. This could be used by other organizations to access your [place] medical records  [id number]         Blood Pressure from Last 3 Encounters:   03/01/17 136/68   09/27/16 138/76   03/29/16 130/75    Weight from Last 3 Encounters:   03/01/17 74.4 kg (164 lb)   09/27/16 75.8 kg (167 lb)   03/29/16 79.8 kg (176 lb)              Today, you had the following     No orders found for display       Primary Care Provider Office Phone # Fax #    Maritza Belkys Lopez [phone]       [facility] [address] [facility] [address]        Thank you!     Thank you for choosing [facility]  for your care. Our goal is always to provide you with excellent care. Hearing back from our patients is one way we can continue to improve our services. Please take a few minutes to complete the written survey that you may receive in the mail after your visit with us. Thank you!             Your Updated Medication List - Protect others around you: Learn how to safely use, store and throw away your medicines at www.disposemymeds.org.          This list is accurate as of: 3/7/17  8:11 AM.  Always use your most recent med list.                   Brand Name Dispense Instructions for use    albuterol 108 (90 BASE) MCG/ACT Inhaler    PROAIR HFA/PROVENTIL HFA/VENTOLIN HFA    1 Inhaler    Inhale 2 puffs into the lungs every 6 hours       amLODIPine 5 MG tablet    NORVASC    90 tablet    Take 1 tablet (5 mg) by mouth daily       atorvastatin 20 MG tablet    LIPITOR    90 tablet    Take 1 tablet (20 mg) by mouth daily       benzonatate 200 MG capsule    TESSALON    90 capsule    Take 1 capsule (200 mg) by mouth 3 times daily as needed for cough       CALCIUM 600/VITAMIN D PO      1 everyday       CLARITIN 10 MG tablet   Generic drug:  loratadine      1 TABLET DAILY       diclofenac 0.1 % ophthalmic solution    VOLTAREN    5 mL    Place 1 drop Into the left eye 3 times  daily       FISH OIL PO      one everyday       GLUCOSAMINE 1500 COMPLEX PO      one everyday       irbesartan 300 MG tablet    AVAPRO    90 tablet    Take 1 tablet (300 mg) by mouth daily       metoprolol 100 MG 24 hr tablet    TOPROL-XL    90 tablet    Take 1 tablet (100 mg) by mouth daily       moxifloxacin 0.5 % ophthalmic solution    VIGAMOX    1 Bottle    Place 1 drop Into the left eye 3 times daily       omeprazole 20 MG tablet     30 tablet    Take 1 tablet by mouth daily. Take 30-60 minutes before a meal.       ONE-A-DAY WEIGHT SMART ADVANCE PO      1 every other day       prednisoLONE acetate 1 % ophthalmic susp    PRED FORTE    5 mL    Place 1 drop Into the left eye 3 times daily       TYLENOL CAPS 500 MG OR      1 CAPSULE EVERY 4 HOURS AS NEEDED       umeclidinium 62.5 MCG/INH oral inhaler    INCRUSE ELLIPTA    90 each    Inhale 1 puff (62.5 mcg) into the lungs daily       VITAMIN C PO      1 everyday       vitamin D 1000 UNITS capsule      Take 1 capsule by mouth daily.

## 2017-03-07 NOTE — PATIENT INSTRUCTIONS
POST-OP CATARACT INSTRUCTIONS    Use the following drops in the left eye:    Vigamox (tan cap) 3 times a day through end of Saturday, then put remainder in fridge.    Continue:  Prednisolone (pink or white cap) 3 times a day  Diclofenac (gray cap) 3 times a day    ~Wait at least 5 minutes between drops.    ~Wear eye shield at night for one week.    ~Do not exert yourself to the point that you are red in the face for one week.    ~If your vision worsens, eye becomes increasingly red, or becomes painful, call [phone].    ~If you are taking glaucoma medications, continue as usual.    ~OK to resume aspirin and/or other blood thinners.    Davi Mosquera M.D.

## 2017-03-09 ENCOUNTER — OFFICE VISIT (OUTPATIENT)
Dept: OPHTHALMOLOGY | Facility: CLINIC | Age: 72
End: 2017-03-09
Payer: COMMERCIAL

## 2017-03-09 ENCOUNTER — TELEPHONE (OUTPATIENT)
Dept: OPHTHALMOLOGY | Facility: CLINIC | Age: 72
End: 2017-03-09

## 2017-03-09 DIAGNOSIS — T78.40XA ALLERGIC REACTION, INITIAL ENCOUNTER: ICD-10-CM

## 2017-03-09 DIAGNOSIS — Z96.1 PSEUDOPHAKIA: Primary | ICD-10-CM

## 2017-03-09 PROCEDURE — 99024 POSTOP FOLLOW-UP VISIT: CPT | Performed by: OPHTHALMOLOGY

## 2017-03-09 ASSESSMENT — VISUAL ACUITY
OS_SC: 20/70
METHOD: SNELLEN - LINEAR
OS_PH_SC: 20/60
OS_SC+: +2

## 2017-03-09 NOTE — PATIENT INSTRUCTIONS
Continue Vigamox  (Tan)three times daily  Continue Prednisolone (pink) four times daily   Stop Diclofenac (gray)  Cold packs twice daily   Stop using the shield  Keep scheduled appt on Tuesday.    Davi Mosquera M.D.

## 2017-03-09 NOTE — MR AVS SNAPSHOT
After Visit Summary   3/9/2017    Kelsea Valentine    MRN: [id number]           Patient Information     Date Of Birth          1945        Visit Information        Provider Department      3/9/2017 8:30 AM Davi Mosquera MD [facility]        Today's Diagnoses     Pseudophakia, os    -  1      Care Instructions    Continue Vigamox  (Tan)three times daily  Continue Prednisolone (pink) four times daily   Stop Diclofenac (gray)  Cold packs twice daily   Stop using the shield  Keep scheduled appt on Tuesday.    Davi Mosquera M.D.          Follow-ups after your visit        Your next 10 appointments already scheduled     Mar 14, 2017  3:45 PM CDT   [facility]    [address] [facility] [address] [phone]            Mar 21, 2017  8:00 AM CDT   LAB with AN LAB   [facility]    [address] [facility] [address] [phone]           Patient must bring picture ID.  Patient should be prepared to give a urine specimen  Please do not eat 10-12 hours before your appointment if you are coming in fasting for labs on lipids, cholesterol, or glucose (sugar).  Pregnant women should follow their Care Team instructions. Water with medications is okay. Do not drink coffee or other fluids.   If you have concerns about taking  your medications, please ask at office or if scheduling via [employer], send a message by clicking on Secure Messaging, Message Your Care Team.            Mar 28, 2017 10:15 AM CDT   SHORT with Maritza Lopez MD   [facility]    [address] [facility] [address] [phone]            Apr 14, 2017  9:15 AM CDT   Return Visit with Davi Mosquera MD   [facility] ([facility])    [address] [facility] [address] [phone]             "  Who to contact     If you have questions or need follow up information about today's clinic visit or your schedule please contact [facility] directly at [phone].  Normal or non-critical lab and imaging results will be communicated to you by MyChart, letter or phone within 4 business days after the clinic has received the results. If you do not hear from us within 7 days, please contact the clinic through MyChart or phone. If you have a critical or abnormal lab result, we will notify you by phone as soon as possible.  Submit refill requests through [employer] or call your pharmacy and they will forward the refill request to us. Please allow 3 business days for your refill to be completed.          Additional Information About Your Visit        [facility] Information     [employer] lets you send messages to your doctor, view your test results, renew your prescriptions, schedule appointments and more. To sign up, go to [email or web address] . Click on \"Log in\" on the left side of the screen, which will take you to the Welcome page. Then click on \"Sign up Now\" on the right side of the page.     You will be asked to enter the access code listed below, as well as some personal information. Please follow the directions to create your username and password.     Your access code is: [email or web address]  Expires: 2017 12:48 PM     Your access code will  in 90 days. If you need help or a new code, please call your [place] clinic or [phone].        Care EveryWhere ID     This is your Care EveryWhere ID. This could be used by other organizations to access your [place] medical records  [id number]         Blood Pressure from Last 3 Encounters:   17 136/68   16 138/76   16 130/75    Weight from Last 3 Encounters:   17 74.4 kg (164 lb)   16 75.8 kg (167 lb)   16 79.8 kg (176 lb)              Today, you had the following     No orders found for display       Primary Care " Provider Office Phone # Fax #    Maritza Belkys Lopez [phone]       [facility] [address] [facility] [address]        Thank you!     Thank you for choosing [facility]  for your care. Our goal is always to provide you with excellent care. Hearing back from our patients is one way we can continue to improve our services. Please take a few minutes to complete the written survey that you may receive in the mail after your visit with us. Thank you!             Your Updated Medication List - Protect others around you: Learn how to safely use, store and throw away your medicines at www.disposemymeds.org.          This list is accurate as of: 3/9/17  9:16 AM.  Always use your most recent med list.                   Brand Name Dispense Instructions for use    albuterol 108 (90 BASE) MCG/ACT Inhaler    PROAIR HFA/PROVENTIL HFA/VENTOLIN HFA    1 Inhaler    Inhale 2 puffs into the lungs every 6 hours       amLODIPine 5 MG tablet    NORVASC    90 tablet    Take 1 tablet (5 mg) by mouth daily       atorvastatin 20 MG tablet    LIPITOR    90 tablet    Take 1 tablet (20 mg) by mouth daily       benzonatate 200 MG capsule    TESSALON    90 capsule    Take 1 capsule (200 mg) by mouth 3 times daily as needed for cough       CALCIUM 600/VITAMIN D PO      1 everyday       CLARITIN 10 MG tablet   Generic drug:  loratadine      1 TABLET DAILY       diclofenac 0.1 % ophthalmic solution    VOLTAREN    5 mL    Place 1 drop Into the left eye 3 times daily       FISH OIL PO      one everyday       GLUCOSAMINE 1500 COMPLEX PO      one everyday       irbesartan 300 MG tablet    AVAPRO    90 tablet    Take 1 tablet (300 mg) by mouth daily       metoprolol 100 MG 24 hr tablet    TOPROL-XL    90 tablet    Take 1 tablet (100 mg) by mouth daily       moxifloxacin 0.5 % ophthalmic solution    VIGAMOX    1 Bottle    Place 1 drop Into the left eye 3 times daily       omeprazole 20 MG tablet     30  tablet    Take 1 tablet by mouth daily. Take 30-60 minutes before a meal.       ONE-A-DAY WEIGHT SMART ADVANCE PO      1 every other day       prednisoLONE acetate 1 % ophthalmic susp    PRED FORTE    5 mL    Place 1 drop Into the left eye 3 times daily       TYLENOL CAPS 500 MG OR      1 CAPSULE EVERY 4 HOURS AS NEEDED       umeclidinium 62.5 MCG/INH oral inhaler    INCRUSE ELLIPTA    90 each    Inhale 1 puff (62.5 mcg) into the lungs daily       VITAMIN C PO      1 everyday       vitamin D 1000 UNITS capsule      Take 1 capsule by mouth daily.

## 2017-03-09 NOTE — TELEPHONE ENCOUNTER
3/9/17    Kelsea called and left a message that she needed to speak with Dr. Mosquera.  That is all she said.    Magdy Knox  Clinical

## 2017-03-09 NOTE — PROGRESS NOTES
Current Eye Medications:  Vigamox, diclofenac, and Prednisolone 1% left eye three times a day.      Subjective:  Patient woke this morning with swelling, redness, and itching around her left eye.  No concerns when she went to bed last night.  Patient states she has had similar episodes of this in the past, and was told she was allergic to some eye drops she was using.  Vision is good without correction.           Objective:  See Ophthalmology Exam.       Assessment:  Probable allergic reaction to one of the eyedrops.      Plan: Continue Vigamox  (Tan)three times daily  Continue Prednisolone (pink) four times daily   Stop Diclofenac (gray)  Cold packs twice daily   Stop using the shield  Keep scheduled appt on Tuesday.    Davi Mosquera M.D.

## 2017-03-14 ENCOUNTER — DOCUMENTATION ONLY (OUTPATIENT)
Dept: LAB | Facility: CLINIC | Age: 72
End: 2017-03-14

## 2017-03-14 ENCOUNTER — OFFICE VISIT (OUTPATIENT)
Dept: OPHTHALMOLOGY | Facility: CLINIC | Age: 72
End: 2017-03-14
Payer: COMMERCIAL

## 2017-03-14 DIAGNOSIS — E78.5 HYPERLIPIDEMIA LDL GOAL <100: ICD-10-CM

## 2017-03-14 DIAGNOSIS — I10 HYPERTENSION GOAL BP (BLOOD PRESSURE) < 140/90: ICD-10-CM

## 2017-03-14 DIAGNOSIS — Z96.1 PSEUDOPHAKIA: Primary | ICD-10-CM

## 2017-03-14 DIAGNOSIS — N18.30 CKD (CHRONIC KIDNEY DISEASE) STAGE 3, GFR 30-59 ML/MIN (H): Primary | ICD-10-CM

## 2017-03-14 DIAGNOSIS — H26.9 CATARACT: ICD-10-CM

## 2017-03-14 PROCEDURE — 92136 OPHTHALMIC BIOMETRY: CPT | Mod: 26 | Performed by: OPHTHALMOLOGY

## 2017-03-14 PROCEDURE — 99024 POSTOP FOLLOW-UP VISIT: CPT | Performed by: OPHTHALMOLOGY

## 2017-03-14 ASSESSMENT — VISUAL ACUITY
OS_PH_SC: 20/25-1
METHOD: SNELLEN - LINEAR
OS_SC: 20/30

## 2017-03-14 ASSESSMENT — TONOMETRY
OS_IOP_MMHG: 17
IOP_METHOD: APPLANATION

## 2017-03-14 ASSESSMENT — REFRACTION_MANIFEST
OS_SPHERE: -0.25
OS_CYLINDER: +1.25
OS_AXIS: 029

## 2017-03-14 NOTE — PROGRESS NOTES
This patient has a lab only appointment on 3/21/2017 but does not have future orders. Please review, associate diagnosis and sign pending lab orders for the upcoming appointment.  She has an appointment with Dr. Lopez on 3/28/2017.    Thank you,    [facility]

## 2017-03-14 NOTE — PATIENT INSTRUCTIONS
POST OP INSTRUCTIONS FOR THE LEFT EYE    1)   Continue Prednisolone (pink) three times daily until each is gone.    2)   Stop shield one week following surgery.    3)   No eye rubbing.    4)   Use over the counter Hydrocort three times daily to left eyelids.    PRE-OP CATARACT INSTRUCTIONS FOR THE RIGHT EYE    *You will be picking up 1 eye drop bottle from your pharmacy.    *Use the following drops in the right eye  3 times a day the day before surgery and once the morning of surgery:                                  Vigamox (tan cap)    *If taking more than one drop, wait five minutes between drops.    *No solid food or liquids after midnight.    *You may take your regular pills with a sip of water.    *If you are taking glaucoma drops, continue as usual.    YOUR EYE DROP IS AT YOUR PHARMACY    Davi Mosquera M.D.

## 2017-03-14 NOTE — MR AVS SNAPSHOT
After Visit Summary   3/14/2017    Kelsea Valentine    MRN: [id number]           Patient Information     Date Of Birth          1945        Visit Information        Provider Department      3/14/2017 3:45 PM Davi Mosquera MD [facility]        Care Instructions    1)   Continue Prednisolone (pink) three times daily until each is gone.    2)   Stop shield one week following surgery.    3)   No eye rubbing.    4)   Use over the counter Hydrocort three times daily to left eyelids.    5)  We will call to schedule surgery for right eye.     Davi Mosquera M.D.          Follow-ups after your visit        Your next 10 appointments already scheduled     Mar 21, 2017  8:00 AM CDT   [facility]   [facility]    [address] [address] [phone]           Patient must bring picture ID.  Patient should be prepared to give a urine specimen  Please do not eat 10-12 hours before your appointment if you are coming in fasting for labs on lipids, cholesterol, or glucose (sugar).  Pregnant women should follow their Care Team instructions. Water with medications is okay. Do not drink coffee or other fluids.   If you have concerns about taking  your medications, please ask at office or if scheduling via Live Gamer[employer], send a message by clicking on Secure Messaging, Message Your Care Team.            Mar 28, 2017 10:15 AM CDT   [facility]    [address] [facility] [address] [phone]            Apr 14, 2017  9:15 AM CDT   Return Visit with Davi Mosquera MD   [facility]    [address] [facility] [address] [phone]              Future tests that were ordered for you today     Open Future Orders        Priority Expected Expires Ordered    Basic metabolic panel  (Ca, Cl, CO2, Creat, Gluc, K,  "Na, BUN) Routine 3/21/2017 3/14/2018 3/14/2017    Lipid Profile with reflex to direct LDL Routine 3/21/2017 3/14/2018 3/14/2017    ** Albumin Random Urine Quant FUTURE 1yr Routine 3/21/2017 3/14/2018 3/14/2017            Who to contact     If you have questions or need follow up information about today's clinic visit or your schedule please contact [facility] directly at [phone].  Normal or non-critical lab and imaging results will be communicated to you by [employer]Lo[employer]art, letter or phone within 4 business days after the clinic has received the results. If you do not hear from us within 7 days, please contact the clinic through [employer] or phone. If you have a critical or abnormal lab result, we will notify you by phone as soon as possible.  Submit refill requests through [employer] or call your pharmacy and they will forward the refill request to us. Please allow 3 business days for your refill to be completed.          Additional Information About Your Visit        Ch[employer] Information     [employer] lets you send messages to your doctor, view your test results, renew your prescriptions, schedule appointments and more. To sign up, go to [email or web address] . Click on \"Log in\" on the left side of the screen, which will take you to the Welcome page. Then click on \"Sign up Now\" on the right side of the page.     You will be asked to enter the access code listed below, as well as some personal information. Please follow the directions to create your username and password.     Your access code is: [email or web address]  Expires: 2017  1:48 PM     Your access code will  in 90 days. If you need help or a new code, please call your [place] clinic or [phone].        Care EveryWhere ID     This is your Care EveryWhere ID. This could be used by other organizations to access your [place] medical records  [id number]         Blood Pressure from Last 3 Encounters:   17 136/68   16 138/76   16 " 130/75    Weight from Last 3 Encounters:   03/01/17 74.4 kg (164 lb)   09/27/16 75.8 kg (167 lb)   03/29/16 79.8 kg (176 lb)              Today, you had the following     No orders found for display       Primary Care Provider Office Phone # Fax #    Maritza Belkys Lopez [phone]       [facility] [address] [facility] [address]        Thank you!     Thank you for choosing [facility]  for your care. Our goal is always to provide you with excellent care. Hearing back from our patients is one way we can continue to improve our services. Please take a few minutes to complete the written survey that you may receive in the mail after your visit with us. Thank you!             Your Updated Medication List - Protect others around you: Learn how to safely use, store and throw away your medicines at www.disposemymeds.org.          This list is accurate as of: 3/14/17  4:43 PM.  Always use your most recent med list.                   Brand Name Dispense Instructions for use    albuterol 108 (90 BASE) MCG/ACT Inhaler    PROAIR HFA/PROVENTIL HFA/VENTOLIN HFA    1 Inhaler    Inhale 2 puffs into the lungs every 6 hours       amLODIPine 5 MG tablet    NORVASC    90 tablet    Take 1 tablet (5 mg) by mouth daily       atorvastatin 20 MG tablet    LIPITOR    90 tablet    Take 1 tablet (20 mg) by mouth daily       benzonatate 200 MG capsule    TESSALON    90 capsule    Take 1 capsule (200 mg) by mouth 3 times daily as needed for cough       CALCIUM 600/VITAMIN D PO      1 everyday       CLARITIN 10 MG tablet   Generic drug:  loratadine      1 TABLET DAILY       FISH OIL PO      one everyday       GLUCOSAMINE 1500 COMPLEX PO      one everyday       irbesartan 300 MG tablet    AVAPRO    90 tablet    Take 1 tablet (300 mg) by mouth daily       metoprolol 100 MG 24 hr tablet    TOPROL-XL    90 tablet    Take 1 tablet (100 mg) by mouth daily       moxifloxacin 0.5 % ophthalmic solution     VIGAMOX    1 Bottle    Place 1 drop Into the left eye 3 times daily       omeprazole 20 MG tablet     30 tablet    Take 1 tablet by mouth daily. Take 30-60 minutes before a meal.       ONE-A-DAY WEIGHT SMART ADVANCE PO      1 every other day       prednisoLONE acetate 1 % ophthalmic susp    PRED FORTE    5 mL    Place 1 drop Into the left eye 3 times daily       TYLENOL CAPS 500 MG OR      1 CAPSULE EVERY 4 HOURS AS NEEDED       umeclidinium 62.5 MCG/INH oral inhaler    INCRUSE ELLIPTA    90 each    Inhale 1 puff (62.5 mcg) into the lungs daily       VITAMIN C PO      1 everyday       vitamin D 1000 UNITS capsule      Take 1 capsule by mouth daily.

## 2017-03-14 NOTE — PROGRESS NOTES
Current Eye Medications: Vigamox and Prednisolone QID left eye     Subjective:  Here for PO2 left eye.  Seeing so well.  Around the eye is less red, but still a little puffy. Dry around the eye.     Objective:  See Ophthalmology Exam.       Assessment:  Doing well status/post Kelman phacoemulsification/ posterior chamber lens left eye.  Recent allergic reaction improved. Possibly related to Diclofenac.        Plan:  Will proceed with surgery right eye.  See Patient Instructions.

## 2017-03-15 RX ORDER — PREDNISOLONE ACETATE 10 MG/ML
1 SUSPENSION/ DROPS OPHTHALMIC 3 TIMES DAILY
Qty: 5 ML | Refills: 0 | Status: SHIPPED | OUTPATIENT
Start: 2017-03-21 | End: 2018-07-31

## 2017-03-20 PROBLEM — H26.9 CATARACT: Status: RESOLVED | Noted: 2017-02-24 | Resolved: 2017-03-20

## 2017-03-21 ENCOUNTER — OFFICE VISIT (OUTPATIENT)
Dept: OPHTHALMOLOGY | Facility: CLINIC | Age: 72
End: 2017-03-21
Payer: COMMERCIAL

## 2017-03-21 DIAGNOSIS — Z96.1 PSEUDOPHAKIA: Primary | ICD-10-CM

## 2017-03-21 DIAGNOSIS — N18.30 CKD (CHRONIC KIDNEY DISEASE) STAGE 3, GFR 30-59 ML/MIN (H): ICD-10-CM

## 2017-03-21 DIAGNOSIS — E78.5 HYPERLIPIDEMIA LDL GOAL <100: ICD-10-CM

## 2017-03-21 LAB
ANION GAP SERPL CALCULATED.3IONS-SCNC: 7 MMOL/L (ref 3–14)
BUN SERPL-MCNC: 17 MG/DL (ref 7–30)
CALCIUM SERPL-MCNC: 9.4 MG/DL (ref 8.5–10.1)
CHLORIDE SERPL-SCNC: 107 MMOL/L (ref 94–109)
CHOLEST SERPL-MCNC: 169 MG/DL
CO2 SERPL-SCNC: 28 MMOL/L (ref 20–32)
CREAT SERPL-MCNC: 1.08 MG/DL (ref 0.52–1.04)
CREAT UR-MCNC: 58 MG/DL
GFR SERPL CREATININE-BSD FRML MDRD: 50 ML/MIN/1.7M2
GLUCOSE SERPL-MCNC: 114 MG/DL (ref 70–99)
HDLC SERPL-MCNC: 44 MG/DL
LDLC SERPL CALC-MCNC: 75 MG/DL
MICROALBUMIN UR-MCNC: 52 MG/L
MICROALBUMIN/CREAT UR: 91.13 MG/G CR (ref 0–25)
NONHDLC SERPL-MCNC: 125 MG/DL
POTASSIUM SERPL-SCNC: 4.2 MMOL/L (ref 3.4–5.3)
SODIUM SERPL-SCNC: 142 MMOL/L (ref 133–144)
TRIGL SERPL-MCNC: 250 MG/DL

## 2017-03-21 PROCEDURE — 99024 POSTOP FOLLOW-UP VISIT: CPT | Performed by: OPHTHALMOLOGY

## 2017-03-21 PROCEDURE — 82043 UR ALBUMIN QUANTITATIVE: CPT | Performed by: FAMILY MEDICINE

## 2017-03-21 PROCEDURE — 80048 BASIC METABOLIC PNL TOTAL CA: CPT | Performed by: FAMILY MEDICINE

## 2017-03-21 PROCEDURE — 36415 COLL VENOUS BLD VENIPUNCTURE: CPT | Performed by: FAMILY MEDICINE

## 2017-03-21 PROCEDURE — 80061 LIPID PANEL: CPT | Performed by: FAMILY MEDICINE

## 2017-03-21 ASSESSMENT — VISUAL ACUITY
OD_SC: 20/80
METHOD: SNELLEN - LINEAR
OD_SC+: +1
OD_PH_SC: 20/30-1

## 2017-03-21 ASSESSMENT — TONOMETRY
IOP_METHOD: APPLANATION
OD_IOP_MMHG: 18

## 2017-03-21 ASSESSMENT — SLIT LAMP EXAM - LIDS: COMMENTS: NORMAL

## 2017-03-21 NOTE — PROGRESS NOTES
Current Eye Medications:  Vigamox and Prednisolone TID right eye     Subjective:  Here for po1 right eye, no pain.  Doing well.      Objective:  See Ophthalmology Exam.       Assessment: Doing well status/post Kelman phacoemulsification/ posterior chamber lens right eye.      Plan:   See Patient Instructions.

## 2017-03-21 NOTE — MR AVS SNAPSHOT
After Visit Summary   3/21/2017    Kelsea Valentine    MRN: [id number]           Patient Information     Date Of Birth          1945        Visit Information        Provider Department      3/21/2017 3:45 PM Davi Mosquera MD [facility]        Today's Diagnoses     Pseudophakia, ou    -  1      Care Instructions    POST-OP CATARACT INSTRUCTIONS    Use the following drops in the right eye:    Ofloxacin (tan cap) 3 times a day .  Prednisolone (pink or white cap) 3 times a day    Continue Prednisolone three times daily left eye for 2 weeks, then stop.      ~Wait at least 5 minutes between drops.    ~Wear eye shield at night for one week.    ~Do not exert yourself to the point that you are red in the face for one week.    ~If your vision worsens, eye becomes increasingly red, or becomes painful, call [phone].    ~If you are taking glaucoma medications, continue as usual.    ~OK to resume aspirin and/or other blood thinners.    Davi Mosquera M.D.            Follow-ups after your visit        Your next 10 appointments already scheduled     Mar 28, 2017 10:15 AM CDT   SHORT with Maritza Lopez MD   [facility]    [address] [facility] [address] [phone]            Mar 28, 2017 12:45 PM CDT   Return Visit with Davi Mosquera MD   [facility] ([facility]    [address] [place] [id number] [phone]            Apr 28, 2017  2:45 PM CDT   Return Visit with Davi Mosquera MD   [facility] ([facility])    [address] [place] [id number] [phone]              Who to contact     If you have questions or need follow up information about today's clinic visit or your schedule please contact [facility] directly at [phone].  Normal or non-critical lab and imaging results will be communicated to you by MyChart, letter or  "phone within 4 business days after the clinic has received the results. If you do not hear from us within 7 days, please contact the clinic through Linekong or phone. If you have a critical or abnormal lab result, we will notify you by phone as soon as possible.  Submit refill requests through Linekong or call your pharmacy and they will forward the refill request to us. Please allow 3 business days for your refill to be completed.          Additional Information About Your Visit        [employer] Information     Linekong lets you send messages to your doctor, view your test results, renew your prescriptions, schedule appointments and more. To sign up, go to [email or web address] . Click on \"Log in\" on the left side of the screen, which will take you to the Welcome page. Then click on \"Sign up Now\" on the right side of the page.     You will be asked to enter the access code listed below, as well as some personal information. Please follow the directions to create your username and password.     Your access code is: [email or web address]  Expires: 2017  1:48 PM     Your access code will  in 90 days. If you need help or a new code, please call your [place] clinic or [phone].        Care EveryWhere ID     This is your Care EveryWhere ID. This could be used by other organizations to access your [place] medical records  [id number]         Blood Pressure from Last 3 Encounters:   17 136/68   16 138/76   16 130/75    Weight from Last 3 Encounters:   17 74.4 kg (164 lb)   16 75.8 kg (167 lb)   16 79.8 kg (176 lb)              Today, you had the following     No orders found for display       Primary Care Provider Office Phone # Fax #    Maritza Lopez [phone]       [facility] [address] [facility] [address]        Thank you!     Thank you for choosing [facility]  for your care. Our goal is always to provide you with " excellent care. Hearing back from our patients is one way we can continue to improve our services. Please take a few minutes to complete the written survey that you may receive in the mail after your visit with us. Thank you!             Your Updated Medication List - Protect others around you: Learn how to safely use, store and throw away your medicines at www.disposemymeds.org.          This list is accurate as of: 3/21/17  4:11 PM.  Always use your most recent med list.                   Brand Name Dispense Instructions for use    albuterol 108 (90 BASE) MCG/ACT Inhaler    PROAIR HFA/PROVENTIL HFA/VENTOLIN HFA    1 Inhaler    Inhale 2 puffs into the lungs every 6 hours       amLODIPine 5 MG tablet    NORVASC    90 tablet    Take 1 tablet (5 mg) by mouth daily       atorvastatin 20 MG tablet    LIPITOR    90 tablet    Take 1 tablet (20 mg) by mouth daily       benzonatate 200 MG capsule    TESSALON    90 capsule    Take 1 capsule (200 mg) by mouth 3 times daily as needed for cough       CALCIUM 600/VITAMIN D PO      1 everyday       CLARITIN 10 MG tablet   Generic drug:  loratadine      1 TABLET DAILY       FISH OIL PO      one everyday       GLUCOSAMINE 1500 COMPLEX PO      one everyday       irbesartan 300 MG tablet    AVAPRO    90 tablet    Take 1 tablet (300 mg) by mouth daily       metoprolol 100 MG 24 hr tablet    TOPROL-XL    90 tablet    Take 1 tablet (100 mg) by mouth daily       moxifloxacin 0.5 % ophthalmic solution    VIGAMOX    1 Bottle    Place 1 drop Into the left eye 3 times daily       omeprazole 20 MG tablet     30 tablet    Take 1 tablet by mouth daily. Take 30-60 minutes before a meal.       ONE-A-DAY WEIGHT SMART ADVANCE PO      1 every other day       prednisoLONE acetate 1 % ophthalmic susp    PRED FORTE    5 mL    Place 1 drop into the right eye 3 times daily       TYLENOL CAPS 500 MG OR      1 CAPSULE EVERY 4 HOURS AS NEEDED       umeclidinium 62.5 MCG/INH oral inhaler    INCRUSE ELLIPTA     90 each    Inhale 1 puff (62.5 mcg) into the lungs daily       VITAMIN C PO      1 everyday       vitamin D 1000 UNITS capsule      Take 1 capsule by mouth daily.

## 2017-03-21 NOTE — PATIENT INSTRUCTIONS
POST-OP CATARACT INSTRUCTIONS    Use the following drops in the right eye:    Ofloxacin (tan cap) 3 times a day .  Prednisolone (pink or white cap) 3 times a day    Continue Prednisolone three times daily left eye for 2 weeks, then stop.      ~Wait at least 5 minutes between drops.    ~Wear eye shield at night for one week.    ~Do not exert yourself to the point that you are red in the face for one week.    ~If your vision worsens, eye becomes increasingly red, or becomes painful, call [phone].    ~If you are taking glaucoma medications, continue as usual.    ~OK to resume aspirin and/or other blood thinners.    Davi Mosquera M.D.

## 2017-03-27 NOTE — PROGRESS NOTES
SUBJECTIVE:                                                    Kelsea Valentine is a 72 year old female who presents to clinic today for the following health issues:      Hyperlipidemia Follow-Up      Rate your low fat/cholesterol diet?: fair    Taking statin?  Yes, no muscle aches from statin    Other lipid medications/supplements?:  none     Hypertension Follow-up      Outpatient blood pressures are being checked at home.  Results are 124-137/ 60.    Low Salt Diet: low salt    Hypertension ROS: taking medications as instructed, no medication side effects noted, no TIA's, no chest pain on exertion, no dyspnea on exertion, no swelling of ankles.  No headache or visual changes.      COPD Follow-Up    Symptoms are currently: improved    Current fatigue or dyspnea with ambulation: none    Shortness of breath: slightly worsened    Increased or change in Cough/Sputum: No    Fever(s): No    Baseline ambulation without stopping to rest none feet, blocks. Able to walk up 3 flights of stairs without stopping to rest.    Any ER/UC or hospital admissions since your last visit? No     History   Smoking Status     Current Every Day Smoker     Packs/day: 0.50     Types: Cigarettes     Last attempt to quit: 3/15/2010   Smokeless Tobacco     Never Used     Comment:  smokes     Lab Results   Component Value Date    FEV1 1.16 03/24/2014    KRS2IYE 43% 03/24/2014            Amount of exercise or physical activity: 6-7 days/week for an average of 15-30 minutes    Problems taking medications regularly: No    Medication side effects: none    Diet: low salt and diabetic      Problem list and histories reviewed & adjusted, as indicated.  Additional history: as documented    Patient Active Problem List   Diagnosis     Hyperlipidemia LDL goal <100     Hypertensive hypertrophic cardiomyopathy (H)     Mitral valve insufficiency     Hypertension goal BP (blood pressure) < 140/90     CKD (chronic kidney disease) stage 3, GFR 30-59 ml/min      GERD (gastroesophageal reflux disease)     COPD (chronic obstructive pulmonary disease) (H)     Abnormal glucose     Advanced directives, counseling/discussion     Pseudophakia, ou     Past Surgical History:   Procedure Laterality Date     CATARACT IOL, RT/LT       CHOLECYSTECTOMY, OPEN       COLONOSCOPY N/A 7/27/2015    Procedure: COLONOSCOPY;  Surgeon: Dilip Quintero MD;  Location: [facility] OR     COLONOSCOPY WITH CO2 INSUFFLATION N/A 7/27/2015    Procedure: COLONOSCOPY WITH CO2 INSUFFLATION;  Surgeon: Dilip Quintero MD;  Location: [facility] OR     HC TOOTH EXTRACTION W/FORCEP      false teeth     NON-SURGICAL SEPTAL REDUCTION THERAPY W/ CORONARY ARTERIOGRAMS, W/WO TEMPORARY PACEMAKER       PHACOEMULSIFICATION WITH STANDARD INTRAOCULAR LENS IMPLANT  03/2017; 3/2017    left eye; right eye     SURGICAL HISTORY OF -   04/2010    mitral valvuloplasty [place]       Social History   Substance Use Topics     Smoking status: Current Every Day Smoker     Packs/day: 0.50     Types: Cigarettes     Last attempt to quit: 3/15/2010     Smokeless tobacco: Never Used      Comment:  smokes     Alcohol use No     Family History   Problem Relation Age of Onset     CANCER Mother      stomach,bone     Alcohol/Drug Mother      smoker     C.A.D. Father      CANCER Father      Alcohol/Drug Father      smoker     Hypertension Father      HEART DISEASE Sister      Lipids Daughter      HEART DISEASE Sister      Breast Cancer Sister 64     Other Cancer Sister      bone , kidney     CEREBROVASCULAR DISEASE Brother      Cardiovascular Brother      DIABETES Brother      Hypertension Brother      Hypertension Brother      Cancer - colorectal Daughter 41     Thyroid Disease No family hx of      Glaucoma No family hx of      Macular Degeneration No family hx of          Current Outpatient Prescriptions   Medication Sig Dispense Refill     metoprolol (TOPROL-XL) 100 MG 24 hr tablet Take 1 tablet (100 mg) by mouth daily 90 tablet 1      umeclidinium (INCRUSE ELLIPTA) 62.5 MCG/INH oral inhaler Inhale 1 puff into the lungs daily 1 Inhaler 5     amLODIPine (NORVASC) 5 MG tablet Take 1 tablet (5 mg) by mouth daily 90 tablet 1     irbesartan (AVAPRO) 300 MG tablet Take 1 tablet (300 mg) by mouth daily 90 tablet 1     albuterol (PROAIR HFA/PROVENTIL HFA/VENTOLIN HFA) 108 (90 BASE) MCG/ACT Inhaler Inhale 2 puffs into the lungs every 6 hours 1 Inhaler 5     benzonatate (TESSALON) 200 MG capsule Take 1 capsule (200 mg) by mouth 3 times daily as needed for cough 90 capsule 3     prednisoLONE acetate (PRED FORTE) 1 % ophthalmic susp Place 1 drop into the right eye 3 times daily 5 mL 0     moxifloxacin (VIGAMOX) 0.5 % ophthalmic solution Place 1 drop Into the left eye 3 times daily 1 Bottle 0     atorvastatin (LIPITOR) 20 MG tablet Take 1 tablet (20 mg) by mouth daily 90 tablet 3     [DISCONTINUED] albuterol (PROAIR HFA, PROVENTIL HFA, VENTOLIN HFA) 108 (90 BASE) MCG/ACT inhaler Inhale 2 puffs into the lungs every 6 hours 1 Inhaler 5     omeprazole 20 MG tablet Take 1 tablet by mouth daily. Take 30-60 minutes before a meal. 30 tablet 1     Cholecalciferol (VITAMIN D) 1000 UNIT capsule Take 1 capsule by mouth daily.       CLARITIN 10 MG OR TABS 1 TABLET DAILY       TYLENOL CAPS 500 MG OR 1 CAPSULE EVERY 4 HOURS AS NEEDED       CALCIUM 600/VITAMIN D OR 1 everyday       VITAMIN C OR 1 everyday       ONE-A-DAY WEIGHT SMART ADVANCE OR 1 every other day       GLUCOSAMINE 1500 COMPLEX OR one everyday       FISH OIL OR one everyday       BP Readings from Last 3 Encounters:   03/28/17 125/73   03/01/17 136/68   09/27/16 138/76    Wt Readings from Last 3 Encounters:   03/28/17 161 lb (73 kg)   03/01/17 164 lb (74.4 kg)   09/27/16 167 lb (75.8 kg)                  Labs reviewed in EPIC    Reviewed and updated as needed this visit by clinical staff  Tobacco  Allergies  Meds  Problems  Med Hx  Surg Hx  Fam Hx  Soc Hx        Reviewed and updated as needed this visit  by Provider  Allergies  Meds  Problems         ROS:  Constitutional, HEENT, cardiovascular, pulmonary, gi and gu systems are negative, except as otherwise noted.    OBJECTIVE:                                                    /73  Pulse 64  Temp 97  F (36.1  C) (Oral)  Wt 161 lb (73 kg)  SpO2 97%  BMI 27.64 kg/m2  Body mass index is 27.64 kg/(m^2).  GENERAL: healthy, alert and no distress  EYES: Eyes grossly normal to inspection, PERRL and conjunctivae and sclerae normal  NECK: no adenopathy, no asymmetry, masses, or scars and thyroid normal to palpation  RESP: lungs clear to auscultation - no rales, rhonchi or wheezes, prolonged expiratory phase and decreased breath sounds throughout  CV: regular rate and rhythm, normal S1 S2, no S3 or S4, no murmur, click or rub, no peripheral edema and peripheral pulses strong  MS: no gross musculoskeletal defects noted, no edema  SKIN: no suspicious lesions or rashes  PSYCH: mentation appears normal, affect normal/bright    Diagnostic Test Results:  none      ASSESSMENT/PLAN:                                                    (I10) Essential hypertension with goal blood pressure less than 140/90  Comment: to goal  Plan: metoprolol (TOPROL-XL) 100 MG 24 hr tablet,         amLODIPine (NORVASC) 5 MG tablet         Refill x 6 months f/u 6 months for wellness exam and labs     (I11.9,  I42.2) Hypertensive hypertrophic cardiomyopathy, without heart failure (H)  Comment: doing well  Plan: metoprolol (TOPROL-XL) 100 MG 24 hr tablet,         amLODIPine (NORVASC) 5 MG tablet, irbesartan         (AVAPRO) 300 MG tablet         Refill x 6 months     (J43.9) Pulmonary emphysema, unspecified emphysema type (H)  Comment: stable  Plan: COPD ACTION PLAN, umeclidinium (INCRUSE         ELLIPTA) 62.5 MCG/INH oral inhaler, albuterol         (PROAIR HFA/PROVENTIL HFA/VENTOLIN HFA) 108 (90        BASE) MCG/ACT Inhaler, benzonatate (TESSALON)         200 MG capsule         Refill x 6  months see above for f/u plan      See Patient Instructions    Maritza Lopez MD  [facility]

## 2017-03-28 ENCOUNTER — OFFICE VISIT (OUTPATIENT)
Dept: FAMILY MEDICINE | Facility: CLINIC | Age: 72
End: 2017-03-28
Payer: COMMERCIAL

## 2017-03-28 ENCOUNTER — TELEPHONE (OUTPATIENT)
Dept: FAMILY MEDICINE | Facility: CLINIC | Age: 72
End: 2017-03-28

## 2017-03-28 ENCOUNTER — OFFICE VISIT (OUTPATIENT)
Dept: OPHTHALMOLOGY | Facility: CLINIC | Age: 72
End: 2017-03-28
Payer: COMMERCIAL

## 2017-03-28 VITALS
WEIGHT: 161 LBS | HEART RATE: 64 BPM | OXYGEN SATURATION: 97 % | DIASTOLIC BLOOD PRESSURE: 73 MMHG | TEMPERATURE: 97 F | BODY MASS INDEX: 27.64 KG/M2 | SYSTOLIC BLOOD PRESSURE: 125 MMHG

## 2017-03-28 DIAGNOSIS — J43.9 PULMONARY EMPHYSEMA, UNSPECIFIED EMPHYSEMA TYPE (H): ICD-10-CM

## 2017-03-28 DIAGNOSIS — I42.2 HYPERTENSIVE HYPERTROPHIC CARDIOMYOPATHY, WITHOUT HEART FAILURE (H): ICD-10-CM

## 2017-03-28 DIAGNOSIS — I10 ESSENTIAL HYPERTENSION WITH GOAL BLOOD PRESSURE LESS THAN 140/90: ICD-10-CM

## 2017-03-28 DIAGNOSIS — I11.9 HYPERTENSIVE HYPERTROPHIC CARDIOMYOPATHY, WITHOUT HEART FAILURE (H): ICD-10-CM

## 2017-03-28 DIAGNOSIS — Z96.1 PSEUDOPHAKIA: Primary | ICD-10-CM

## 2017-03-28 DIAGNOSIS — I10 HYPERTENSION GOAL BP (BLOOD PRESSURE) < 140/90: Primary | ICD-10-CM

## 2017-03-28 PROCEDURE — 99024 POSTOP FOLLOW-UP VISIT: CPT | Performed by: OPHTHALMOLOGY

## 2017-03-28 PROCEDURE — 99214 OFFICE O/P EST MOD 30 MIN: CPT | Performed by: FAMILY MEDICINE

## 2017-03-28 RX ORDER — METOPROLOL SUCCINATE 100 MG/1
100 TABLET, EXTENDED RELEASE ORAL DAILY
Qty: 90 TABLET | Refills: 1 | Status: SHIPPED | OUTPATIENT
Start: 2017-03-28 | End: 2017-08-14

## 2017-03-28 RX ORDER — ALBUTEROL SULFATE 90 UG/1
2 AEROSOL, METERED RESPIRATORY (INHALATION) EVERY 6 HOURS
Qty: 1 INHALER | Refills: 5 | Status: SHIPPED | OUTPATIENT
Start: 2017-03-28 | End: 2017-07-18

## 2017-03-28 RX ORDER — AMLODIPINE BESYLATE 5 MG/1
5 TABLET ORAL DAILY
Qty: 90 TABLET | Refills: 1 | Status: SHIPPED | OUTPATIENT
Start: 2017-03-28 | End: 2017-08-14

## 2017-03-28 RX ORDER — IRBESARTAN 300 MG/1
300 TABLET ORAL DAILY
Qty: 90 TABLET | Refills: 1 | Status: SHIPPED | OUTPATIENT
Start: 2017-03-28 | End: 2017-08-28

## 2017-03-28 RX ORDER — BENZONATATE 200 MG/1
200 CAPSULE ORAL 3 TIMES DAILY PRN
Qty: 90 CAPSULE | Refills: 3 | Status: SHIPPED | OUTPATIENT
Start: 2017-03-28 | End: 2019-08-13

## 2017-03-28 ASSESSMENT — VISUAL ACUITY
OS_SC: 20/20
OD_SC+: -1
OD_SC: 20/50
METHOD: SNELLEN - LINEAR

## 2017-03-28 ASSESSMENT — REFRACTION_MANIFEST
OD_CYLINDER: +1.50
OD_AXIS: 010
OD_SPHERE: -2.75

## 2017-03-28 ASSESSMENT — TONOMETRY
OD_IOP_MMHG: 20
IOP_METHOD: APPLANATION

## 2017-03-28 ASSESSMENT — SLIT LAMP EXAM - LIDS: COMMENTS: NORMAL

## 2017-03-28 NOTE — PATIENT INSTRUCTIONS
1) Continue Prednisolone three times daily until gone both eyes.    2)  Stop using shield after one week one week after surgery.    3)  Return for final exam as scheduled in about one month.    4)   license form completed.    Davi Mosquera M.D.

## 2017-03-28 NOTE — NURSING NOTE
"Chief Complaint   Patient presents with     Lipids     Hypertension       Initial /73  Pulse 64  Temp 97  F (36.1  C) (Oral)  Wt 161 lb (73 kg)  SpO2 97%  BMI 27.64 kg/m2 Estimated body mass index is 27.64 kg/(m^2) as calculated from the following:    Height as of 3/1/17: 5' 4\" (1.626 m).    Weight as of this encounter: 161 lb (73 kg).  Medication Reconciliation: complete  "

## 2017-03-28 NOTE — TELEPHONE ENCOUNTER
Need previsit labs-see orders and close encounter if nothing else needed./Trista Fields,       Cholesterol, BP 9/26/17

## 2017-03-28 NOTE — MR AVS SNAPSHOT
After Visit Summary   3/28/2017    Kelsea Valentine    MRN: [id number]           Patient Information     Date Of Birth          1945        Visit Information        Provider Department      3/28/2017 12:45 PM Davi Mosquera MD [facility]        Care Instructions    1) Continue Prednisolone three times daily until gone both eyes.    2)  Stop using shield after one week one week after surgery.    3)  Return for final exam as scheduled in about one month.    4)   license form completed.    Davi Mosquera M.D.        Follow-ups after your visit        Your next 10 appointments already scheduled     Apr 28, 2017  2:45 PM CDT   Return Visit with Davi Mosquera MD   [facility] ([facility])    [address] [facility] [place] [address]   [phone]            Sep 19, 2017  8:00 AM CDT   LAB with AN LAB   [facility] ([facility])    [address] [address] [phone]           Patient must bring picture ID.  Patient should be prepared to give a urine specimen  Please do not eat 10-12 hours before your appointment if you are coming in fasting for labs on lipids, cholesterol, or glucose (sugar).  Pregnant women should follow their Care Team instructions. Water with medications is okay. Do not drink coffee or other fluids.   If you have concerns about taking  your medications, please ask at office or if scheduling via [employer], send a message by clicking on Secure Messaging, Message Your Care Team.            Sep 26, 2017 10:15 AM CDT   Office Visit with Maritza Lopez MD   [facility] ([facility])    [address] [id number] [phone]           Bring a current list of meds and any records pertaining to this visit.  For Physicals, please bring immunization records and any forms needing to be filled out.  Please arrive 10 minutes early to complete  "paperwork.              Who to contact     If you have questions or need follow up information about today's clinic visit or your schedule please contact [facility] directly at [phone].  Normal or non-critical lab and imaging results will be communicated to you by MyChart, letter or phone within 4 business days after the clinic has received the results. If you do not hear from us within 7 days, please contact the clinic through MyChart or phone. If you have a critical or abnormal lab result, we will notify you by phone as soon as possible.  Submit refill requests through [employer] or call your pharmacy and they will forward the refill request to us. Please allow 3 business days for your refill to be completed.          Additional Information About Your Visit        [employer] [facility] Information     [employer] lets you send messages to your doctor, view your test results, renew your prescriptions, schedule appointments and more. To sign up, go to [email or web address] . Click on \"Log in\" on the left side of the screen, which will take you to the Welcome page. Then click on \"Sign up Now\" on the right side of the page.     You will be asked to enter the access code listed below, as well as some personal information. Please follow the directions to create your username and password.     Your access code is: [email or web address]  Expires: 2017  1:48 PM     Your access code will  in 90 days. If you need help or a new code, please call your [place] clinic or [phone].        Care EveryWhere ID     This is your Care EveryWhere ID. This could be used by other organizations to access your [place] medical records  [id number]         Blood Pressure from Last 3 Encounters:   17 125/73   17 136/68   16 138/76    Weight from Last 3 Encounters:   17 73 kg (161 lb)   17 74.4 kg (164 lb)   16 75.8 kg (167 lb)              Today, you had the following     No orders found for display "         Today's Medication Changes          These changes are accurate as of: 3/28/17  1:14 PM.  If you have any questions, ask your nurse or doctor.               These medicines have changed or have updated prescriptions.        Dose/Directions    umeclidinium 62.5 MCG/INH oral inhaler   Commonly known as:  INCRUSE ELLIPTA   This may have changed:  how much to take   Used for:  Pulmonary emphysema, unspecified emphysema type (H)   Changed by:  Maritza Lopez MD        Dose:  1 puff   Inhale 1 puff into the lungs daily   Quantity:  1 Inhaler   Refills:  5            Where to get your medicines      These medications were sent to [facility] [id number] [place] [address], [facility] [address]  [address] [address], [facility] [address]     Phone:  [phone]     albuterol 108 (90 BASE) MCG/ACT Inhaler    umeclidinium 62.5 MCG/INH oral inhaler         These medications were sent to [facility] [address]  [address] [facility] [address]     Phone:  [phone]     amLODIPine 5 MG tablet    irbesartan 300 MG tablet    metoprolol 100 MG 24 hr tablet         Some of these will need a paper prescription and others can be bought over the counter.  Ask your nurse if you have questions.     Bring a paper prescription for each of these medications     benzonatate 200 MG capsule                Primary Care Provider Office Phone # Fax #    Maritza Lopez [phone]       [facility] [address] [facility] [address]        Thank you!     Thank you for choosing [facility]  for your care. Our goal is always to provide you with excellent care. Hearing back from our patients is one way we can continue to improve our services. Please take a few minutes to complete the written survey that you may receive in the mail after your visit with us. Thank you!             Your Updated Medication List - Protect  others around you: Learn how to safely use, store and throw away your medicines at www.disposemymeds.org.          This list is accurate as of: 3/28/17  1:14 PM.  Always use your most recent med list.                   Brand Name Dispense Instructions for use    albuterol 108 (90 BASE) MCG/ACT Inhaler    PROAIR HFA/PROVENTIL HFA/VENTOLIN HFA    1 Inhaler    Inhale 2 puffs into the lungs every 6 hours       amLODIPine 5 MG tablet    NORVASC    90 tablet    Take 1 tablet (5 mg) by mouth daily       atorvastatin 20 MG tablet    LIPITOR    90 tablet    Take 1 tablet (20 mg) by mouth daily       benzonatate 200 MG capsule    TESSALON    90 capsule    Take 1 capsule (200 mg) by mouth 3 times daily as needed for cough       CALCIUM 600/VITAMIN D PO      1 everyday       CLARITIN 10 MG tablet   Generic drug:  loratadine      1 TABLET DAILY       FISH OIL PO      one everyday       GLUCOSAMINE 1500 COMPLEX PO      one everyday       irbesartan 300 MG tablet    AVAPRO    90 tablet    Take 1 tablet (300 mg) by mouth daily       metoprolol 100 MG 24 hr tablet    TOPROL-XL    90 tablet    Take 1 tablet (100 mg) by mouth daily       moxifloxacin 0.5 % ophthalmic solution    VIGAMOX    1 Bottle    Place 1 drop Into the left eye 3 times daily       omeprazole 20 MG tablet     30 tablet    Take 1 tablet by mouth daily. Take 30-60 minutes before a meal.       ONE-A-DAY WEIGHT SMART ADVANCE PO      1 every other day       prednisoLONE acetate 1 % ophthalmic susp    PRED FORTE    5 mL    Place 1 drop into the right eye 3 times daily       TYLENOL CAPS 500 MG OR      1 CAPSULE EVERY 4 HOURS AS NEEDED       umeclidinium 62.5 MCG/INH oral inhaler    INCRUSE ELLIPTA    1 Inhaler    Inhale 1 puff into the lungs daily       VITAMIN C PO      1 everyday       vitamin D 1000 UNITS capsule      Take 1 capsule by mouth daily.

## 2017-03-28 NOTE — MR AVS SNAPSHOT
After Visit Summary   3/28/2017    Kelsea Valentine    MRN: [id number]           Patient Information     Date Of Birth          1945        Visit Information        Provider Department      3/28/2017 10:15 AM Maritza Lopez MD [facility]        Today's Diagnoses     Essential hypertension with goal blood pressure less than 140/90        Hypertensive hypertrophic cardiomyopathy, without heart failure (H)        Pulmonary emphysema, unspecified emphysema type (H)          Care Instructions        Wellness exam in 6 months.          Follow-ups after your visit        Your next 10 appointments already scheduled     Mar 28, 2017 12:45 PM CDT   Return Visit with Davi Mosquera MD   [facility] ([facility])    [address] [facility] [address] [phone]            Apr 28, 2017  2:45 PM CDT   Return Visit with Davi Mosquera MD   [facility] ([facility])    [address] [facility] [address] [phone]              Who to contact     If you have questions or need follow up information about today's clinic visit or your schedule please contact [facility] directly at [phone].  Normal or non-critical lab and imaging results will be communicated to you by MyChart, letter or phone within 4 business days after the clinic has received the results. If you do not hear from us within 7 days, please contact the clinic through [employer] or phone. If you have a critical or abnormal lab result, we will notify you by phone as soon as possible.  Submit refill requests through [employer] or call your pharmacy and they will forward the refill request to us. Please allow 3 business days for your refill to be completed.          Additional Information About Your Visit        [employer] Category Information     [employer] lets you send messages to your doctor, view your test results, renew your prescriptions, schedule  "appointments and more. To sign up, go to [email or web address] . Click on \"Log in\" on the left side of the screen, which will take you to the Welcome page. Then click on \"Sign up Now\" on the right side of the page.     You will be asked to enter the access code listed below, as well as some personal information. Please follow the directions to create your username and password.     Your access code is: [email or web address]  Expires: 2017  1:48 PM     Your access code will  in 90 days. If you need help or a new code, please call your [place] clinic or [phone].        Care EveryWhere ID     This is your Care EveryWhere ID. This could be used by other organizations to access your [place] medical records  [id number]        Your Vitals Were     Pulse Temperature Pulse Oximetry BMI (Body Mass Index)          64 97  F (36.1  C) (Oral) 97% 27.64 kg/m2         Blood Pressure from Last 3 Encounters:   17 125/73   17 136/68   16 138/76    Weight from Last 3 Encounters:   17 161 lb (73 kg)   17 164 lb (74.4 kg)   16 167 lb (75.8 kg)              We Performed the Following     COPD ACTION PLAN          Today's Medication Changes          These changes are accurate as of: 3/28/17 10:39 AM.  If you have any questions, ask your nurse or doctor.               These medicines have changed or have updated prescriptions.        Dose/Directions    umeclidinium 62.5 MCG/INH oral inhaler   Commonly known as:  INCRUSE ELLIPTA   This may have changed:  how much to take   Used for:  Pulmonary emphysema, unspecified emphysema type (H)   Changed by:  Maritza Lopez MD        Dose:  1 puff   Inhale 1 puff into the lungs daily   Quantity:  1 Inhaler   Refills:  5            Where to get your medicines      These medications were sent to [facility] [address]  [address] [address] [address] [facility] [address], [place] [address], [facility] [address]     Phone:  [phone]     " albuterol 108 (90 BASE) MCG/ACT Inhaler    umeclidinium 62.5 MCG/INH oral inhaler         These medications were sent to [facility] [place], [place] - [address] [facility] [address]  [address], [facility] [address]     Phone:  [phone]     amLODIPine 5 MG tablet    irbesartan 300 MG tablet    metoprolol 100 MG 24 hr tablet         Some of these will need a paper prescription and others can be bought over the counter.  Ask your nurse if you have questions.     Bring a paper prescription for each of these medications     benzonatate 200 MG capsule                Primary Care Provider Office Phone # Fax #    Maritza Lopez [phone]       [facility] [address] [facility] [address]        Thank you!     Thank you for choosing [facility]  for your care. Our goal is always to provide you with excellent care. Hearing back from our patients is one way we can continue to improve our services. Please take a few minutes to complete the written survey that you may receive in the mail after your visit with us. Thank you!             Your Updated Medication List - Protect others around you: Learn how to safely use, store and throw away your medicines at www.disposemymeds.org.          This list is accurate as of: 3/28/17 10:39 AM.  Always use your most recent med list.                   Brand Name Dispense Instructions for use    albuterol 108 (90 BASE) MCG/ACT Inhaler    PROAIR HFA/PROVENTIL HFA/VENTOLIN HFA    1 Inhaler    Inhale 2 puffs into the lungs every 6 hours       amLODIPine 5 MG tablet    NORVASC    90 tablet    Take 1 tablet (5 mg) by mouth daily       atorvastatin 20 MG tablet    LIPITOR    90 tablet    Take 1 tablet (20 mg) by mouth daily       benzonatate 200 MG capsule    TESSALON    90 capsule    Take 1 capsule (200 mg) by mouth 3 times daily as needed for cough       CALCIUM 600/VITAMIN D PO      1 everyday       CLARITIN 10 MG tablet    Generic drug:  loratadine      1 TABLET DAILY       FISH OIL PO      one everyday       GLUCOSAMINE 1500 COMPLEX PO      one everyday       irbesartan 300 MG tablet    AVAPRO    90 tablet    Take 1 tablet (300 mg) by mouth daily       metoprolol 100 MG 24 hr tablet    TOPROL-XL    90 tablet    Take 1 tablet (100 mg) by mouth daily       moxifloxacin 0.5 % ophthalmic solution    VIGAMOX    1 Bottle    Place 1 drop Into the left eye 3 times daily       omeprazole 20 MG tablet     30 tablet    Take 1 tablet by mouth daily. Take 30-60 minutes before a meal.       ONE-A-DAY WEIGHT SMART ADVANCE PO      1 every other day       prednisoLONE acetate 1 % ophthalmic susp    PRED FORTE    5 mL    Place 1 drop into the right eye 3 times daily       TYLENOL CAPS 500 MG OR      1 CAPSULE EVERY 4 HOURS AS NEEDED       umeclidinium 62.5 MCG/INH oral inhaler    INCRUSE ELLIPTA    1 Inhaler    Inhale 1 puff into the lungs daily       VITAMIN C PO      1 everyday       vitamin D 1000 UNITS capsule      Take 1 capsule by mouth daily.

## 2017-03-28 NOTE — LETTER
My COPD Action Plan   Name: Kelsea Valentine    YOB: 1945   Date: 3/27/2017    My doctor: Maritza Lopez MD   My clinic: [address] [address] [phone]  My Controller Medicine: { :751436}   Dose: ***     My Rescue Medicine: { :309543}   Dose: ***     My Flare Up Medicine: { :579696}   Dose: ***  My COPD Severity: { :151105}     Use of Oxygen: { :972194}        GREEN ZONE       Doing well today      Usual level of activity and exercise    Usual amount of cough and mucus    No shortness of breath    Usual level of health (thinking clearly, sleeping well, feel like eating) Actions:      Take daily medicines    Use oxygen as prescribed    Follow regular exercise and diet plan    Avoid cigarette smoke and other irritants that harm the lungs           YELLOW ZONE          Having a bad day or flare up      Short of breath more than usual    A lot more sputum (mucus) than usual    Sputum looks yellow, green, tan, brown or bloody    More coughing or wheezing    Fever or chills    Less energy; trouble completing activities    Trouble thinking or focusing    Using quick relief inhaler or nebulizer more often    Poor sleep; symptoms wake me up    Do not feel like eating Actions:      Get plenty of rest    Take daily medicines    Use quick relief inhaler every *** hours    If you use oxygen, call you doctor to see if you should adjust your oxygen    Do breathing exercises or other things to help you relax    Let a loved one, friend or neighbor know you are feeling worse    Call your care team if you have 2 or more symptoms.  Start taking steroids or antibiotics if directed by your care team           RED ZONE       Need medical care now      Severe shortness of breath (feel you can't breathe)    Fever, chills    Not enough breath to do any activity    Trouble coughing up mucus, walking or talking    Blood in mucus    Frequent coughing   Rescue medicines are  not working    Not able to sleep because of breathing    Feel confused or drowsy    Chest pain    Actions:      Call your health care team.  If you cannot reach your care team, call 911 or go to the emergency room.        Electronically signed by: Dafne Tarango, March 27, 2017  Annual Reminders:  Meet with Care Team, Flu Shot every Fall and Pneumonia Shot at least once  Pharmacy:    [id number] - [place], [place] - [address]  [facility] FAX ONLY - NEW RX ONLY  [facility] FAX ONLY - REFILL RX ONLY  [facility] - [place], [place] - [address]

## 2017-03-28 NOTE — TELEPHONE ENCOUNTER
I have scheduled the attached patient for a hypertension/cholesterol check with a lab appointment one week prior.  Please review and enter labs.  Thank you.

## 2017-03-28 NOTE — PROGRESS NOTES
Current Eye Medications:  Ofloxacin tid right eye, Pred tid right eye, Pred tid left eye.     Subjective:  PO2 Kelman Phacoemulsification Intraocular lens right eye.  Right eye is feeling good.  Vision is good.     Objective:  See Ophthalmology Exam.       Assessment: Doing well status/post Kelman phacoemulsification/ posterior chamber lens right eye.      Plan:   See Patient Instructions.

## 2017-04-28 ENCOUNTER — OFFICE VISIT (OUTPATIENT)
Dept: OPHTHALMOLOGY | Facility: CLINIC | Age: 72
End: 2017-04-28
Payer: COMMERCIAL

## 2017-04-28 ENCOUNTER — APPOINTMENT (OUTPATIENT)
Dept: OPTOMETRY | Facility: CLINIC | Age: 72
End: 2017-04-28
Payer: COMMERCIAL

## 2017-04-28 DIAGNOSIS — Z96.1 PSEUDOPHAKIA: Primary | ICD-10-CM

## 2017-04-28 PROCEDURE — V2750 ANTI-REFLECTIVE COATING: HCPCS | Mod: RT | Performed by: OPHTHALMOLOGY

## 2017-04-28 PROCEDURE — 99024 POSTOP FOLLOW-UP VISIT: CPT | Performed by: OPHTHALMOLOGY

## 2017-04-28 PROCEDURE — V2020 VISION SVCS FRAMES PURCHASES: HCPCS | Performed by: OPHTHALMOLOGY

## 2017-04-28 PROCEDURE — V2781 PROGRESSIVE LENS PER LENS: HCPCS | Mod: RT | Performed by: OPHTHALMOLOGY

## 2017-04-28 PROCEDURE — V2784 LENS POLYCARB OR EQUAL: HCPCS | Mod: RT | Performed by: OPHTHALMOLOGY

## 2017-04-28 PROCEDURE — V2300 LENS SPHERE TRIFOCAL 4.00D: HCPCS | Mod: RT | Performed by: OPHTHALMOLOGY

## 2017-04-28 ASSESSMENT — VISUAL ACUITY
OS_SC+: -1
OD_SC: 20/100
OS_SC: 20/25
METHOD: SNELLEN - LINEAR

## 2017-04-28 ASSESSMENT — REFRACTION_MANIFEST
OD_ADD: +3.00
OS_CYLINDER: +0.50
OS_SPHERE: -0.50
OD_SPHERE: -2.25
OS_AXIS: 180
OS_ADD: +3.00
OD_AXIS: 165
OD_CYLINDER: +0.25

## 2017-04-28 ASSESSMENT — CUP TO DISC RATIO
OD_RATIO: 0.5
OS_RATIO: 0.2

## 2017-04-28 ASSESSMENT — TONOMETRY
IOP_METHOD: APPLANATION
OS_IOP_MMHG: 19
OD_IOP_MMHG: 19

## 2017-04-28 ASSESSMENT — EXTERNAL EXAM - LEFT EYE: OS_EXAM: PROLAPSED FAT PADS: UPPER, LOWER

## 2017-04-28 ASSESSMENT — EXTERNAL EXAM - RIGHT EYE: OD_EXAM: PROLAPSED FAT PADS: UPPER, LOWER

## 2017-04-28 NOTE — PROGRESS NOTES
Current Eye Medications:  None     Subjective:  Final MR both eyes       Objective:  See Ophthalmology Exam.       Assessment: Doing well status/post Kelman phacoemulsification/ posterior chamber lens both eyes.  Suture victor m removed, Vigamox given now.      Plan:   See Patient Instructions.

## 2017-04-28 NOTE — PATIENT INSTRUCTIONS
1)  Discontinue all drops, unless used prior to cataract surgery.    2)   Fill Rx for new glasses or drugstore readers.    3)   Use Preservative free artificial tear     4)   Return to clinic in three months for a pressure check or earlier if problems should arise.     Davi Mosquera M.D.

## 2017-04-28 NOTE — MR AVS SNAPSHOT
After Visit Summary   4/28/2017    Kelsea Valentine    MRN: [id number]           Patient Information     Date Of Birth          1945        Visit Information        Provider Department      4/28/2017 2:45 PM Davi Mosquera MD [facility]        Today's Diagnoses     Pseudophakia, ou    -  1      Care Instructions    1)  Discontinue all drops, unless used prior to cataract surgery.    2)   Fill Rx for new glasses or drugstore readers.    3)   Use Preservative free artificial tear     4)   Return to clinic in three months for a pressure check or earlier if problems should arise.     Davi Mosquera M.D.             Follow-ups after your visit        Your next 10 appointments already scheduled     Sep 19, 2017  8:00 AM CDT   LAB with AN LAB   [facility]    [address] [facility] [facility] [address]   [phone]           Patient must bring picture ID.  Patient should be prepared to give a urine specimen  Please do not eat 10-12 hours before your appointment if you are coming in fasting for labs on lipids, cholesterol, or glucose (sugar).  Pregnant women should follow their Care Team instructions. Water with medications is okay. Do not drink coffee or other fluids.   If you have concerns about taking  your medications, please ask at office or if scheduling via [facility], send a message by clicking on Secure Messaging, Message Your Care Team.            Sep 26, 2017 10:15 AM CDT   Office Visit with Maritza Lopez MD   [facility] ([facility]    [address] [facility] [address]   [phone]           Bring a current list of meds and any records pertaining to this visit.  For Physicals, please bring immunization records and any forms needing to be filled out.  Please arrive 10 minutes early to complete paperwork.              Who to contact     If you have questions or need follow up information about  "today's clinic visit or your schedule please contact [facility] directly at [phone].  Normal or non-critical lab and imaging results will be communicated to you by MyChart, letter or phone within 4 business days after the clinic has received the results. If you do not hear from us within 7 days, please contact the clinic through [facility] or phone. If you have a critical or abnormal lab result, we will notify you by phone as soon as possible.  Submit refill requests through [facility] or call your pharmacy and they will forward the refill request to us. Please allow 3 business days for your refill to be completed.          Additional Information About Your Visit        [facility] Information     [facility] lets you send messages to your doctor, view your test results, renew your prescriptions, schedule appointments and more. To sign up, go to [email or web address] . Click on \"Log in\" on the left side of the screen, which will take you to the Welcome page. Then click on \"Sign up Now\" on the right side of the page.     You will be asked to enter the access code listed below, as well as some personal information. Please follow the directions to create your username and password.     Your access code is: [email or web address]  Expires: 2017  1:48 PM     Your access code will  in 90 days. If you need help or a new code, please call your [facility] clinic or [phone].        Care EveryWhere ID     This is your Care EveryWhere ID. This could be used by other organizations to access your [facility] medical records  [id number]         Blood Pressure from Last 3 Encounters:   17 125/73   17 136/68   16 138/76    Weight from Last 3 Encounters:   17 73 kg (161 lb)   17 74.4 kg (164 lb)   16 75.8 kg (167 lb)              Today, you had the following     No orders found for display       Primary Care Provider Office Phone # Fax #    Maritza Lopez [phone] " [phone]       [facility] [address] [address] [facility] [address]        Thank you!     Thank you for choosing [facility]  for your care. Our goal is always to provide you with excellent care. Hearing back from our patients is one way we can continue to improve our services. Please take a few minutes to complete the written survey that you may receive in the mail after your visit with us. Thank you!             Your Updated Medication List - Protect others around you: Learn how to safely use, store and throw away your medicines at www.disposemymeds.org.          This list is accurate as of: 4/28/17  3:55 PM.  Always use your most recent med list.                   Brand Name Dispense Instructions for use    albuterol 108 (90 BASE) MCG/ACT Inhaler    PROAIR HFA/PROVENTIL HFA/VENTOLIN HFA    1 Inhaler    Inhale 2 puffs into the lungs every 6 hours       amLODIPine 5 MG tablet    NORVASC    90 tablet    Take 1 tablet (5 mg) by mouth daily       atorvastatin 20 MG tablet    LIPITOR    90 tablet    Take 1 tablet (20 mg) by mouth daily       benzonatate 200 MG capsule    TESSALON    90 capsule    Take 1 capsule (200 mg) by mouth 3 times daily as needed for cough       CALCIUM 600/VITAMIN D PO      1 everyday       CLARITIN 10 MG tablet   Generic drug:  loratadine      1 TABLET DAILY       FISH OIL PO      one everyday       GLUCOSAMINE 1500 COMPLEX PO      one everyday       irbesartan 300 MG tablet    AVAPRO    90 tablet    Take 1 tablet (300 mg) by mouth daily       metoprolol 100 MG 24 hr tablet    TOPROL-XL    90 tablet    Take 1 tablet (100 mg) by mouth daily       moxifloxacin 0.5 % ophthalmic solution    VIGAMOX    1 Bottle    Place 1 drop Into the left eye 3 times daily       omeprazole 20 MG tablet     30 tablet    Take 1 tablet by mouth daily. Take 30-60 minutes before a meal.       ONE-A-DAY WEIGHT SMART ADVANCE PO      1 every other day       prednisoLONE acetate 1 % ophthalmic  susp    PRED FORTE    5 mL    Place 1 drop into the right eye 3 times daily       TYLENOL CAPS 500 MG OR      1 CAPSULE EVERY 4 HOURS AS NEEDED       umeclidinium 62.5 MCG/INH oral inhaler    INCRUSE ELLIPTA    1 Inhaler    Inhale 1 puff into the lungs daily       VITAMIN C PO      1 everyday       vitamin D 1000 UNITS capsule      Take 1 capsule by mouth daily.

## 2017-07-13 ENCOUNTER — TELEPHONE (OUTPATIENT)
Dept: FAMILY MEDICINE | Facility: CLINIC | Age: 72
End: 2017-07-13

## 2017-07-13 ENCOUNTER — OFFICE VISIT (OUTPATIENT)
Dept: URGENT CARE | Facility: URGENT CARE | Age: 72
End: 2017-07-13
Payer: COMMERCIAL

## 2017-07-13 VITALS
HEART RATE: 65 BPM | SYSTOLIC BLOOD PRESSURE: 120 MMHG | BODY MASS INDEX: 29.18 KG/M2 | OXYGEN SATURATION: 90 % | DIASTOLIC BLOOD PRESSURE: 70 MMHG | WEIGHT: 170 LBS | TEMPERATURE: 97.1 F

## 2017-07-13 DIAGNOSIS — R30.0 DYSURIA: Primary | ICD-10-CM

## 2017-07-13 DIAGNOSIS — N30.00 ACUTE CYSTITIS WITHOUT HEMATURIA: ICD-10-CM

## 2017-07-13 LAB
ALBUMIN UR-MCNC: 30 MG/DL
APPEARANCE UR: ABNORMAL
BACTERIA #/AREA URNS HPF: ABNORMAL /HPF
BILIRUB UR QL STRIP: NEGATIVE
COLOR UR AUTO: YELLOW
GLUCOSE UR STRIP-MCNC: NEGATIVE MG/DL
HGB UR QL STRIP: ABNORMAL
KETONES UR STRIP-MCNC: NEGATIVE MG/DL
LEUKOCYTE ESTERASE UR QL STRIP: ABNORMAL
NITRATE UR QL: POSITIVE
NON-SQ EPI CELLS #/AREA URNS LPF: ABNORMAL /LPF
PH UR STRIP: 6 PH (ref 5–7)
RBC #/AREA URNS AUTO: ABNORMAL /HPF (ref 0–2)
SP GR UR STRIP: 1.01 (ref 1–1.03)
TRANS CELLS #/AREA URNS HPF: ABNORMAL /HPF
URN SPEC COLLECT METH UR: ABNORMAL
UROBILINOGEN UR STRIP-ACNC: 0.2 EU/DL (ref 0.2–1)
WBC #/AREA URNS AUTO: ABNORMAL /HPF (ref 0–2)

## 2017-07-13 PROCEDURE — 81001 URINALYSIS AUTO W/SCOPE: CPT | Performed by: NURSE PRACTITIONER

## 2017-07-13 PROCEDURE — 87186 SC STD MICRODIL/AGAR DIL: CPT | Performed by: NURSE PRACTITIONER

## 2017-07-13 PROCEDURE — 87086 URINE CULTURE/COLONY COUNT: CPT | Performed by: NURSE PRACTITIONER

## 2017-07-13 PROCEDURE — 87088 URINE BACTERIA CULTURE: CPT | Performed by: NURSE PRACTITIONER

## 2017-07-13 PROCEDURE — 99213 OFFICE O/P EST LOW 20 MIN: CPT | Performed by: NURSE PRACTITIONER

## 2017-07-13 RX ORDER — SULFAMETHOXAZOLE/TRIMETHOPRIM 800-160 MG
1 TABLET ORAL 2 TIMES DAILY
Qty: 6 TABLET | Refills: 0 | Status: SHIPPED | OUTPATIENT
Start: 2017-07-13 | End: 2017-07-16

## 2017-07-13 NOTE — TELEPHONE ENCOUNTER
Kelsea Valentine is a 72 year old female who calls with burning with urination.    NURSING ASSESSMENT:  Description:  Pt describes a burning sensation when urinating that is a different sensation than what she has felt before.  Onset/duration:  Started yesterday  Associated symptoms:  None - denies urgency or frequency. Denies N/V, fever, or hematuria. Pt states no new back pain.    Allergies:   Allergies   Allergen Reactions     Lisinopril Swelling     Difficulty swallowing.     Ketorolac Swelling and Other (See Comments)     Eye drops     No Clinical Screening - See Comments Other (See Comments)     Diclofenac Rash     Around the left eye     Nickel Rash       NURSING PLAN: Huddle with provider, plan includes provider will not be in clinic tomorrow to review lab results from a lab-only appointment. Advised pt to be seen in urgent care now.     RECOMMENDED DISPOSITION:  See in 24 hours - Urgent care evaluation today. No   Will comply with recommendation: Yes  If further questions/concerns or if symptoms do not improve, worsen or new symptoms develop, call your PCP or [facility] as soon as possible.      Guideline used:  Telephone Triage Protocols for Nurses, Fifth Edition, Kacy Carpenter RN

## 2017-07-13 NOTE — TELEPHONE ENCOUNTER
Patient is having burning when she urinates. Thinks it might be a uti. Does she need an appt. Or can Dr Lopez place an order for her to come in and leave a specimen at the lab? Please call to advise. Ok to leave a message.

## 2017-07-13 NOTE — PATIENT INSTRUCTIONS
Understanding Urinary Tract Infections (UTIs)  Most UTIs are caused by bacteria, although they may also be caused by viruses or fungi. Bacteria from the bowel are the most common source of infection. The infection may start because of any of the following:    Sexual activity. During sex, bacteria can travel from the penis, vagina, or rectum into the urethra.     Bacteria on the skin outside the rectum may travel into the urethra. This is more common in women since the rectum and urethra are closer to each other than in men. Wiping from front to back after using the toilet and keeping the area clean can help prevent germs from getting to the urethra.    Blockage of urine flow through the urinary tract. If urine sits too long, germs may start to grow out of control.      Parts of the urinary tract  The infection can occur in any part of the urinary tract.    The kidneys collect and store urine.    The ureters carry urine from the kidneys to the bladder.    The bladder holds urine until you are ready to let it out.    The urethra carries urine from the bladder out of the body. It is shorter in women, so bacteria can move through it more easily. The urethra is longer in men, so a UTI is less likely to reach the bladder or kidneys in men.  Date Last Reviewed: 1/1/2017 2000-2017 [employer]. [address] [place] [address], [place] [address]. All rights reserved. This information is not intended as a substitute for professional medical care. Always follow your healthcare professional's instructions.

## 2017-07-13 NOTE — LETTER
[facility]  [address] [facility] [address]        July 17, 2017    Kelsea Valentine  [address] [address]            Dear Kelsea,    Positive urine culture. Antibiotic Bactrim is effective. Continue antibiotics. If no relief or worsening symptoms please come back in.   If persistent symptoms recommend being seen   Below is a copy of the results.  It was a pleasure to see you at your last appointment.    If you have any questions or concerns, please call myself or my nurse at [phone].    Sincerely,    Heidy Lopez NP/ks    Results for orders placed or performed in visit on 07/13/17   *UA reflex to Microscopic and Culture ([place] and [facility] (except [place] and [place])   Result Value Ref Range    Color Urine Yellow     Appearance Urine Cloudy     Glucose Urine Negative NEG mg/dL    Bilirubin Urine Negative NEG    Ketones Urine Negative NEG mg/dL    Specific Gravity Urine 1.015 1.003 - 1.035    Blood Urine Large (A) NEG    pH Urine 6.0 5.0 - 7.0 pH    Protein Albumin Urine 30 (A) NEG mg/dL    Urobilinogen Urine 0.2 0.2 - 1.0 EU/dL    Nitrite Urine Positive (A) NEG    Leukocyte Esterase Urine Moderate (A) NEG    Source Midstream Urine    Urine Microscopic   Result Value Ref Range    WBC Urine  (A) 0 - 2 /HPF    RBC Urine 10-25 (A) 0 - 2 /HPF    Squamous Epithelial /LPF Urine Few FEW /LPF    Transitional Epi Few FEW /HPF    Bacteria Urine Many (A) NEG /HPF   Urine Culture Aerobic Bacterial   Result Value Ref Range    Specimen Description Midstream Urine     Culture Micro >100,000 colonies/mL Escherichia coli (A)     Micro Report Status FINAL 07/15/2017     Organism: >100,000 colonies/mL Escherichia coli        Susceptibility    >100,000 colonies/ml escherichia coli (santana) -  (no method available)     AMPICILLIN <=2 Susceptible  ug/mL     CEFAZOLIN Value in next row  ug/mL      <=4 SusceptibleCefazolin SANTANA breakpoints are for the treatment of  uncomplicated urinary tract infections.  For the treatment of systemic infections, please contact the laboratory for additional testing.     CEFOXITIN Value in next row  ug/mL      <=4 SusceptibleCefazolin JESUSITA breakpoints are for the treatment of uncomplicated urinary tract infections.  For the treatment of systemic infections, please contact the laboratory for additional testing.     CEFTAZIDIME Value in next row  ug/mL      <=4 SusceptibleCefazolin JESUSITA breakpoints are for the treatment of uncomplicated urinary tract infections.  For the treatment of systemic infections, please contact the laboratory for additional testing.     CEFTRIAXONE Value in next row  ug/mL      <=4 SusceptibleCefazolin JESUSIAT breakpoints are for the treatment of uncomplicated urinary tract infections.  For the treatment of systemic infections, please contact the laboratory for additional testing.     CIPROFLOXACIN Value in next row  ug/mL      <=4 SusceptibleCefazolin JESUSITA breakpoints are for the treatment of uncomplicated urinary tract infections.  For the treatment of systemic infections, please contact the laboratory for additional testing.     GENTAMICIN Value in next row  ug/mL      <=4 SusceptibleCefazolin JESUSITA breakpoints are for the treatment of uncomplicated urinary tract infections.  For the treatment of systemic infections, please contact the laboratory for additional testing.     LEVOFLOXACIN Value in next row  ug/mL      <=4 SusceptibleCefazolin JESUSITA breakpoints are for the treatment of uncomplicated urinary tract infections.  For the treatment of systemic infections, please contact the laboratory for additional testing.     NITROFURANTOIN Value in next row  ug/mL      <=4 SusceptibleCefazolin JESUSITA breakpoints are for the treatment of uncomplicated urinary tract infections.  For the treatment of systemic infections, please contact the laboratory for additional testing.     TOBRAMYCIN Value in next row  ug/mL      <=4  SusceptibleCefazolin JESUSITA breakpoints are for the treatment of uncomplicated urinary tract infections.  For the treatment of systemic infections, please contact the laboratory for additional testing.     Trimethoprim/Sulfa Value in next row  ug/mL      <=4 SusceptibleCefazolin JESUSITA breakpoints are for the treatment of uncomplicated urinary tract infections.  For the treatment of systemic infections, please contact the laboratory for additional testing.     AMPICILLIN/SULBACTAM Value in next row  ug/mL      <=4 SusceptibleCefazolin EJSUSITA breakpoints are for the treatment of uncomplicated urinary tract infections.  For the treatment of systemic infections, please contact the laboratory for additional testing.     Piperacillin/Tazo Value in next row  ug/mL      <=4 SusceptibleCefazolin JESUSITA breakpoints are for the treatment of uncomplicated urinary tract infections.  For the treatment of systemic infections, please contact the laboratory for additional testing.     CEFEPIME Value in next row  ug/mL      <=4 SusceptibleCefazolin JESUSITA breakpoints are for the treatment of uncomplicated urinary tract infections.  For the treatment of systemic infections, please contact the laboratory for additional testing.

## 2017-07-13 NOTE — PROGRESS NOTES
SUBJECTIVE:                                                    Kelsea Valentine is a 72 year old female who presents to clinic today for the following health issues:      URINARY TRACT SYMPTOMS      Duration: started tuesday    Description  Feeling is tingling    Intensity:  mild    Accompanying signs and symptoms:  Fever/chills: YES- chills/ small fever  Flank pain YES- but she always has a back ache  Nausea and vomiting: no   Vaginal symptoms: none  Abdominal/Pelvic Pain: no     History  History of frequent UTI's: no   History of kidney stones: no   Sexually Active: no   Possibility of pregnancy: No    Precipitating or alleviating factors: None    Therapies tried and outcome: cranberry juice  and Tylenol   Outcome: tingles        Allergies   Allergen Reactions     Lisinopril Swelling     Difficulty swallowing.     Ketorolac Swelling and Other (See Comments)     Eye drops     No Clinical Screening - See Comments Other (See Comments)     Diclofenac Rash     Around the left eye     Nickel Rash       Past Medical History:   Diagnosis Date     Cardiomyopathy, hypertrophic obstructive (H)      CHF (congestive heart failure) (H)      High myopia, both eyes      HTN (hypertension)      Nonsenile cataract      Other primary cardiomyopathies      Pure hypercholesterolemia      Raynaud's syndrome          Current Outpatient Prescriptions on File Prior to Visit:  metoprolol (TOPROL-XL) 100 MG 24 hr tablet Take 1 tablet (100 mg) by mouth daily   umeclidinium (INCRUSE ELLIPTA) 62.5 MCG/INH oral inhaler Inhale 1 puff into the lungs daily   amLODIPine (NORVASC) 5 MG tablet Take 1 tablet (5 mg) by mouth daily   irbesartan (AVAPRO) 300 MG tablet Take 1 tablet (300 mg) by mouth daily   albuterol (PROAIR HFA/PROVENTIL HFA/VENTOLIN HFA) 108 (90 BASE) MCG/ACT Inhaler Inhale 2 puffs into the lungs every 6 hours   benzonatate (TESSALON) 200 MG capsule Take 1 capsule (200 mg) by mouth 3 times daily as needed for cough   prednisoLONE  acetate (PRED FORTE) 1 % ophthalmic susp Place 1 drop into the right eye 3 times daily   moxifloxacin (VIGAMOX) 0.5 % ophthalmic solution Place 1 drop Into the left eye 3 times daily   atorvastatin (LIPITOR) 20 MG tablet Take 1 tablet (20 mg) by mouth daily   omeprazole 20 MG tablet Take 1 tablet by mouth daily. Take 30-60 minutes before a meal.   Cholecalciferol (VITAMIN D) 1000 UNIT capsule Take 1 capsule by mouth daily.   CLARITIN 10 MG OR TABS 1 TABLET DAILY   TYLENOL CAPS 500 MG OR 1 CAPSULE EVERY 4 HOURS AS NEEDED   CALCIUM 600/VITAMIN D OR 1 everyday   VITAMIN C OR 1 everyday   ONE-A-DAY WEIGHT SMART ADVANCE OR 1 every other day   GLUCOSAMINE 1500 COMPLEX OR one everyday   FISH OIL OR one everyday     No current facility-administered medications on file prior to visit.     Social History   Substance Use Topics     Smoking status: Current Every Day Smoker     Packs/day: 0.50     Types: Cigarettes     Last attempt to quit: 3/15/2010     Smokeless tobacco: Never Used      Comment:  smokes     Alcohol use No       ROS:  General: negative for fever  ABD: Denies abd pain  : as above    OBJECTIVE:  /70  Pulse 65  Temp 97.1  F (36.2  C) (Oral)  Wt 170 lb (77.1 kg)  SpO2 90%  BMI 29.18 kg/m2   General:   awake, alert, and cooperative.  NAD.   Head: Normocephalic, atraumatic.  Eyes: Conjunctiva clear, non icteric.   ABD: soft, no tenderness to palpation , no rigidity, guarding or rebound . No CVAT  Neuro: Alert and oriented - normal speech.     ASSESSMENT:  Lower, uncomplicated urinary tract infection. Benign exam.      ICD-10-CM    1. Dysuria R30.0 *UA reflex to Microscopic and Culture ([facility] and [facility] (except [place] and [place])     Urine Microscopic   2. Acute cystitis without hematuria N30.00 sulfamethoxazole-trimethoprim (BACTRIM DS/SEPTRA DS) 800-160 MG per tablet             PLAN:   As per ordered above.  Drink plenty of fluids.  Prevention and treatment of UTI's discussed.  Follow up with primary care physician if not improving.  Advised about symptoms which might herald more serious problems.    Sailaja Lopez  [facility]  Family Nurse Practitoner

## 2017-07-13 NOTE — NURSING NOTE
"Chief Complaint   Patient presents with     UTI       Initial /70  Pulse 65  Temp 97.1  F (36.2  C) (Oral)  Wt 170 lb (77.1 kg)  SpO2 90%  BMI 29.18 kg/m2 Estimated body mass index is 29.18 kg/(m^2) as calculated from the following:    Height as of 3/1/17: 5' 4\" (1.626 m).    Weight as of this encounter: 170 lb (77.1 kg).  Medication Reconciliation: complete   Stacia Shaw CMA    "

## 2017-07-15 LAB
BACTERIA SPEC CULT: ABNORMAL
MICRO REPORT STATUS: ABNORMAL
MICROORGANISM SPEC CULT: ABNORMAL
SPECIMEN SOURCE: ABNORMAL

## 2017-07-18 DIAGNOSIS — J43.9 PULMONARY EMPHYSEMA, UNSPECIFIED EMPHYSEMA TYPE (H): ICD-10-CM

## 2017-07-18 NOTE — TELEPHONE ENCOUNTER
Per protocol, routed to provider to review and advise on refill due to no spirometry or ACT results on file.      albuterol (PROAIR HFA/PROVENTIL HFA/VENTOLIN HFA) 108 (90 BASE) MCG/ACT Inhaler 1 Inhaler 5 3/28/2017  No   Sig: Inhale 2 puffs into the lungs every 6 hours     umeclidinium (INCRUSE ELLIPTA) 62.5 MCG/INH oral inhaler 1 Inhaler 5 3/28/2017  No   Sig: Inhale 1 puff into the lungs daily     Last Written Prescription Date: 3/28/17  Last Fill Quantity: 1 inhaler, # refills: 5    Last Office Visit with [facility], [facility] or [facility] prescribing provider:  Dr. Lopez   Future Office Visit:    Next 5 appointments (look out 90 days)     Jul 24, 2017 10:45 AM CDT   Return Visit with Davi Mosquera MD   [facility] ([facility])    [address] [address]   [phone]            Sep 26, 2017 10:15 AM CDT   Office Visit with Maritza Lopez MD   [facility]    [address] [address] [phone]                Date of Last Asthma Action Plan Letter:   There are no preventive care reminders to display for this patient.   Asthma Control Test: No flowsheet data found.    Date of Last Spirometry Test:   No results found for this or any previous visit.    Monica Carpenter RN

## 2017-07-18 NOTE — TELEPHONE ENCOUNTER
Pt would like to have refill on her incruse and ventolin hfa inhalers.  Needs to use [facility] in [place] pharmacy, okay to leave message.

## 2017-07-19 RX ORDER — ALBUTEROL SULFATE 90 UG/1
2 AEROSOL, METERED RESPIRATORY (INHALATION) EVERY 6 HOURS
Qty: 1 INHALER | Refills: 2 | Status: SHIPPED | OUTPATIENT
Start: 2017-07-19 | End: 2017-09-26

## 2017-07-19 NOTE — TELEPHONE ENCOUNTER
Spirometry completed 2014  Pt should have refills at pharmacy until due for OV in 9/17, please confirm.

## 2017-07-24 ENCOUNTER — OFFICE VISIT (OUTPATIENT)
Dept: OPHTHALMOLOGY | Facility: CLINIC | Age: 72
End: 2017-07-24
Payer: COMMERCIAL

## 2017-07-24 DIAGNOSIS — Z96.1 PSEUDOPHAKIA: Primary | ICD-10-CM

## 2017-07-24 PROCEDURE — 99213 OFFICE O/P EST LOW 20 MIN: CPT | Performed by: OPHTHALMOLOGY

## 2017-07-24 ASSESSMENT — TONOMETRY
IOP_METHOD: APPLANATION
OS_IOP_MMHG: 17
OD_IOP_MMHG: 18

## 2017-07-24 ASSESSMENT — REFRACTION_MANIFEST
OS_SPHERE: -1.00
OS_ADD: +3.00
OD_AXIS: 023
OD_ADD: +3.00
OD_SPHERE: -2.00
OS_AXIS: 178
OD_CYLINDER: +1.25
OS_CYLINDER: +1.75

## 2017-07-24 ASSESSMENT — EXTERNAL EXAM - LEFT EYE: OS_EXAM: PROLAPSED FAT PADS: UPPER, LOWER

## 2017-07-24 ASSESSMENT — REFRACTION_WEARINGRX
OD_AXIS: 165
OS_CYLINDER: +1.00
OD_SPHERE: -2.25
OD_CYLINDER: +0.50
OS_ADD: +3.00
OS_AXIS: 018
OS_SPHERE: -0.50
OD_ADD: +3.00
SPECS_TYPE: PAL

## 2017-07-24 ASSESSMENT — VISUAL ACUITY
OD_SC: 20/70
METHOD: SNELLEN - LINEAR
OS_SC: 20/30
OD_CC: 20/20-1
OS_CC: 20/20-2

## 2017-07-24 ASSESSMENT — EXTERNAL EXAM - RIGHT EYE: OD_EXAM: PROLAPSED FAT PADS: UPPER, LOWER

## 2017-07-24 NOTE — PATIENT INSTRUCTIONS
Possible clouding of posterior capsule discussed.   Glasses Rx given - regrind  Call in March 2018 for an appointment in July 2018 for Complete Exam    Dr. Mosquera [phone]

## 2017-07-24 NOTE — PROGRESS NOTES
Current Eye Medications:  None     Subjective: TA,  F/U check Kelman Phacoemulsification Intraocular lens both eyes.  Patient feels that she sees better without correction than with correction  She doesn't wear her glasses.     Objective:  See Ophthalmology Exam.       Assessment:  Doing well status/post Kelman phacoemulsification/ posterior chamber lens both eyes.      Plan: Possible clouding of posterior capsule discussed.   Glasses Rx given - regrind  Call in March 2018 for an appointment in July 2018 for Complete Exam    Dr. Mosquera [phone]

## 2017-07-24 NOTE — MR AVS SNAPSHOT
After Visit Summary   7/24/2017    Kelsea Valentine    MRN: [id number]           Patient Information     Date Of Birth          1945        Visit Information        Provider Department      7/24/2017 10:45 AM Davi Mosquera MD [facility]        Care Instructions    Possible clouding of posterior capsule discussed.   Glasses Rx given - regrind  Call in March 2018 for an appointment in July 2018 for Complete Exam    Dr. Mosquera [phone]          Follow-ups after your visit        Your next 10 appointments already scheduled     Sep 19, 2017  8:00 AM CDT   LAB with AN LAB   [facility]    [address] [address] [phone]           Patient must bring picture ID.  Patient should be prepared to give a urine specimen  Please do not eat 10-12 hours before your appointment if you are coming in fasting for labs on lipids, cholesterol, or glucose (sugar).  Pregnant women should follow their Care Team instructions. Water with medications is okay. Do not drink coffee or other fluids.   If you have concerns about taking  your medications, please ask at office or if scheduling via [employer] Experience, send a message by clicking on Secure Messaging, Message Your Care Team.            Sep 26, 2017 10:15 AM CDT   Office Visit with Maritza Lopez MD   [facility]    [address] [address] [phone]           Bring a current list of meds and any records pertaining to this visit.  For Physicals, please bring immunization records and any forms needing to be filled out.  Please arrive 10 minutes early to complete paperwork.              Who to contact     If you have questions or need follow up information about today's clinic visit or your schedule please contact [facility] directly at [phone].  Normal or non-critical lab and imaging results will be  "communicated to you by MyChart, letter or phone within 4 business days after the clinic has received the results. If you do not hear from us within 7 days, please contact the clinic through [employer] or phone. If you have a critical or abnormal lab result, we will notify you by phone as soon as possible.  Submit refill requests through [employer] or call your pharmacy and they will forward the refill request to us. Please allow 3 business days for your refill to be completed.          Additional Information About Your Visit        [employer] Information     [employer] lets you send messages to your doctor, view your test results, renew your prescriptions, schedule appointments and more. To sign up, go to [email or web address] . Click on \"Log in\" on the left side of the screen, which will take you to the Welcome page. Then click on \"Sign up Now\" on the right side of the page.     You will be asked to enter the access code listed below, as well as some personal information. Please follow the directions to create your username and password.     Your access code is: [email or web address]  Expires: 10/11/2017  5:31 PM     Your access code will  in 90 days. If you need help or a new code, please call your [facility] clinic or [phone].        Care EveryWhere ID     This is your Care EveryWhere ID. This could be used by other organizations to access your [facility] medical records  [id number]         Blood Pressure from Last 3 Encounters:   17 120/70   17 125/73   17 136/68    Weight from Last 3 Encounters:   17 77.1 kg (170 lb)   17 73 kg (161 lb)   17 74.4 kg (164 lb)              Today, you had the following     No orders found for display       Primary Care Provider Office Phone # Fax #    Maritza Lopez [phone]       [facility] [address] [address]        Equal Access to Services     JIGNA MAGALLON AH: Hadii jayashree Love " hakeem hipolito marengena glynn. So [facility] [phone].    ATENCIÓN: Si zaina andersen, tiene a copeland disposición servicios gratuitos de asistencia lingüística. Justa al [phone].    We comply with applicable federal civil rights laws and [place] laws. We do not discriminate on the basis of race, color, national origin, age, disability sex, sexual orientation or gender identity.            Thank you!     Thank you for choosing [facility]  for your care. Our goal is always to provide you with excellent care. Hearing back from our patients is one way we can continue to improve our services. Please take a few minutes to complete the written survey that you may receive in the mail after your visit with us. Thank you!             Your Updated Medication List - Protect others around you: Learn how to safely use, store and throw away your medicines at www.disposemymeds.org.          This list is accurate as of: 7/24/17 11:35 AM.  Always use your most recent med list.                   Brand Name Dispense Instructions for use Diagnosis    albuterol 108 (90 BASE) MCG/ACT Inhaler    PROAIR HFA/PROVENTIL HFA/VENTOLIN HFA    1 Inhaler    Inhale 2 puffs into the lungs every 6 hours    Pulmonary emphysema, unspecified emphysema type (H)       amLODIPine 5 MG tablet    NORVASC    90 tablet    Take 1 tablet (5 mg) by mouth daily    Essential hypertension with goal blood pressure less than 140/90, Hypertensive hypertrophic cardiomyopathy, without heart failure (H)       atorvastatin 20 MG tablet    LIPITOR    90 tablet    Take 1 tablet (20 mg) by mouth daily    Hypertensive hypertrophic cardiomyopathy, without heart failure (H)       benzonatate 200 MG capsule    TESSALON    90 capsule    Take 1 capsule (200 mg) by mouth 3 times daily as needed for cough    Pulmonary emphysema, unspecified emphysema type (H)       CALCIUM 600/VITAMIN D PO      1 everyday        CLARITIN  10 MG tablet   Generic drug:  loratadine      1 TABLET DAILY        FISH OIL PO      one everyday        GLUCOSAMINE 1500 COMPLEX PO      one everyday        irbesartan 300 MG tablet    AVAPRO    90 tablet    Take 1 tablet (300 mg) by mouth daily    Hypertensive hypertrophic cardiomyopathy, without heart failure (H)       metoprolol 100 MG 24 hr tablet    TOPROL-XL    90 tablet    Take 1 tablet (100 mg) by mouth daily    Essential hypertension with goal blood pressure less than 140/90, Hypertensive hypertrophic cardiomyopathy, without heart failure (H)       moxifloxacin 0.5 % ophthalmic solution    VIGAMOX    1 Bottle    Place 1 drop Into the left eye 3 times daily    Cataract       omeprazole 20 MG tablet     30 tablet    Take 1 tablet by mouth daily. Take 30-60 minutes before a meal.    Cough, GERD (gastroesophageal reflux disease)       ONE-A-DAY WEIGHT SMART ADVANCE PO      1 every other day        prednisoLONE acetate 1 % ophthalmic susp    PRED FORTE    5 mL    Place 1 drop into the right eye 3 times daily    Cataract       TYLENOL CAPS 500 MG OR      1 CAPSULE EVERY 4 HOURS AS NEEDED        umeclidinium 62.5 MCG/INH oral inhaler    INCRUSE ELLIPTA    1 Inhaler    Inhale 1 puff into the lungs daily    Pulmonary emphysema, unspecified emphysema type (H)       VITAMIN C PO      1 everyday        vitamin D 1000 UNITS capsule      Take 1 capsule by mouth daily.

## 2017-07-31 NOTE — PROGRESS NOTES
SUBJECTIVE:                                                    Kelsea Valentine is a 72 year old female who presents to clinic today for the following health issues:      URINARY TRACT SYMPTOMS      Duration: x 2 weeks     Description  dysuria and tingling , cloudy     Intensity:  moderate    Accompanying signs and symptoms:  Fever/chills: no   Flank pain no   Nausea and vomiting: no   Vaginal symptoms: none  Abdominal/Pelvic Pain: no     History  History of frequent UTI's: YES- recently treated 2 weeks ago   History of kidney stones: no   Sexually Active: yes    Possibility of pregnancy: No    Precipitating or alleviating factors: None    Therapies tried and outcome: course of antibiotics - Bactrim    Outcome: not effective     Completed all bactrim and had no change in symptoms.            Problem list and histories reviewed & adjusted, as indicated.  Additional history: as documented    Patient Active Problem List   Diagnosis     Hyperlipidemia LDL goal <100     Hypertensive hypertrophic cardiomyopathy (H)     Mitral valve insufficiency     Hypertension goal BP (blood pressure) < 140/90     CKD (chronic kidney disease) stage 3, GFR 30-59 ml/min     GERD (gastroesophageal reflux disease)     COPD (chronic obstructive pulmonary disease) (H)     Abnormal glucose     Advanced directives, counseling/discussion     Pseudophakia, ou     Past Surgical History:   Procedure Laterality Date     CATARACT IOL, RT/LT       CHOLECYSTECTOMY, OPEN       COLONOSCOPY N/A 7/27/2015    Procedure: COLONOSCOPY;  Surgeon: Dilip Quintero MD;  Location: [facility] OR     COLONOSCOPY WITH CO2 INSUFFLATION N/A 7/27/2015    Procedure: COLONOSCOPY WITH CO2 INSUFFLATION;  Surgeon: Dilip Quintero MD;  Location: [facility] OR     HC TOOTH EXTRACTION W/FORCEP      false teeth     NON-SURGICAL SEPTAL REDUCTION THERAPY W/ CORONARY ARTERIOGRAMS, W/WO TEMPORARY PACEMAKER       PHACOEMULSIFICATION WITH STANDARD INTRAOCULAR LENS IMPLANT  03/2017;  3/2017    left eye; right eye     SURGICAL HISTORY OF -   04/2010    mitral valvuloplasty [place]       Social History   Substance Use Topics     Smoking status: Current Every Day Smoker     Packs/day: 0.50     Types: Cigarettes     Last attempt to quit: 3/15/2010     Smokeless tobacco: Never Used      Comment:  smokes     Alcohol use No     Family History   Problem Relation Age of Onset     CANCER Mother      stomach,bone     Alcohol/Drug Mother      smoker     C.A.D. Father      CANCER Father      Alcohol/Drug Father      smoker     Hypertension Father      HEART DISEASE Sister      Lipids Daughter      HEART DISEASE Sister      Breast Cancer Sister 64     Other Cancer Sister      bone , kidney     CEREBROVASCULAR DISEASE Brother      Cardiovascular Brother      DIABETES Brother      Hypertension Brother      Hypertension Brother      Cancer - colorectal Daughter 41     Thyroid Disease No family hx of      Glaucoma No family hx of      Macular Degeneration No family hx of          Current Outpatient Prescriptions   Medication Sig Dispense Refill     albuterol (PROAIR HFA/PROVENTIL HFA/VENTOLIN HFA) 108 (90 BASE) MCG/ACT Inhaler Inhale 2 puffs into the lungs every 6 hours 1 Inhaler 2     umeclidinium (INCRUSE ELLIPTA) 62.5 MCG/INH oral inhaler Inhale 1 puff into the lungs daily 1 Inhaler 2     irbesartan (AVAPRO) 300 MG tablet Take 1 tablet (300 mg) by mouth daily 90 tablet 1     benzonatate (TESSALON) 200 MG capsule Take 1 capsule (200 mg) by mouth 3 times daily as needed for cough 90 capsule 3     prednisoLONE acetate (PRED FORTE) 1 % ophthalmic susp Place 1 drop into the right eye 3 times daily 5 mL 0     moxifloxacin (VIGAMOX) 0.5 % ophthalmic solution Place 1 drop Into the left eye 3 times daily 1 Bottle 0     atorvastatin (LIPITOR) 20 MG tablet Take 1 tablet (20 mg) by mouth daily 90 tablet 3     omeprazole 20 MG tablet Take 1 tablet by mouth daily. Take 30-60 minutes before a meal. 30 tablet 1      "Cholecalciferol (VITAMIN D) 1000 UNIT capsule Take 1 capsule by mouth daily.       CLARITIN 10 MG OR TABS 1 TABLET DAILY       TYLENOL CAPS 500 MG OR 1 CAPSULE EVERY 4 HOURS AS NEEDED       CALCIUM 600/VITAMIN D OR 1 everyday       VITAMIN C OR 1 everyday       ONE-A-DAY WEIGHT SMART ADVANCE OR 1 every other day       GLUCOSAMINE 1500 COMPLEX OR one everyday       FISH OIL OR one everyday       metoprolol (TOPROL-XL) 100 MG 24 hr tablet TAKE 1 TABLET EVERY DAY 90 tablet 0     amLODIPine (NORVASC) 5 MG tablet TAKE 1 TABLET EVERY DAY 90 tablet 0     levofloxacin (LEVAQUIN) 250 MG tablet Take 1 tablet (250 mg) by mouth daily 10 tablet 0     estradiol (ESTRACE VAGINAL) 0.1 MG/GM cream Use 2 grams vaginally every night for 2 weeks then use twice weekly for maintenancy 42.5 g 11     BP Readings from Last 3 Encounters:   08/01/17 136/69   07/13/17 120/70   03/28/17 125/73    Wt Readings from Last 3 Encounters:   08/01/17 169 lb (76.7 kg)   07/13/17 170 lb (77.1 kg)   03/28/17 161 lb (73 kg)                  Labs reviewed in EPIC        Reviewed and updated as needed this visit by clinical staffTobacco  Allergies  Meds  Med Hx  Surg Hx  Fam Hx  Soc Hx      Reviewed and updated as needed this visit by Provider         ROS:  Constitutional, HEENT, cardiovascular, pulmonary, gi and gu systems are negative, except as otherwise noted.      OBJECTIVE:   /69  Pulse 61  Temp 96.9  F (36.1  C) (Oral)  Ht 5' 4\" (1.626 m)  Wt 169 lb (76.7 kg)  BMI 29.01 kg/m2  Body mass index is 29.01 kg/(m^2).  GENERAL: healthy, alert and no distress  EYES: Eyes grossly normal to inspection, PERRL and conjunctivae and sclerae normal  ABDOMEN: soft, nontender, no hepatosplenomegaly, no masses and bowel sounds normal  MS: no gross musculoskeletal defects noted, no edema  SKIN: no suspicious lesions or rashes  BACK: no CVA tenderness, no paralumbar tenderness  PSYCH: mentation appears normal, affect normal/bright    Diagnostic Test " Results:  Urinalysis - UA RESULTS:  Recent Labs   Lab Test  08/14/17   1156   COLOR  Yellow   APPEARANCE  Cloudy   URINEGLC  Negative   URINEBILI  Negative   URINEKETONE  Negative   SG  1.010   UBLD  Trace*   URINEPH  7.0   PROTEIN  Negative   UROBILINOGEN  0.2   NITRITE  Positive*   LEUKEST  Large*   RBCU  5-10*   WBCU  >100*          ASSESSMENT/PLAN:     (N30.01) Acute cystitis with hematuria  (primary encounter diagnosis)  (R30.0) Dysuria  (R82.90) Nonspecific finding on examination of urine  Comment: recurring  Plan: DISCONTINUED: nitroFURantoin,         macrocrystal-monohydrate, (MACROBID) 100 MG         capsule        UA with Microscopic reflex to Culture       Urine Culture Aerobic Bacterial        Adjust therapy based on culture results.      See Patient Instructions    Maritza Lopez MD  [facility]

## 2017-08-01 ENCOUNTER — OFFICE VISIT (OUTPATIENT)
Dept: FAMILY MEDICINE | Facility: CLINIC | Age: 72
End: 2017-08-01
Payer: COMMERCIAL

## 2017-08-01 VITALS
SYSTOLIC BLOOD PRESSURE: 136 MMHG | TEMPERATURE: 96.9 F | DIASTOLIC BLOOD PRESSURE: 69 MMHG | HEART RATE: 61 BPM | WEIGHT: 169 LBS | HEIGHT: 64 IN | BODY MASS INDEX: 28.85 KG/M2

## 2017-08-01 DIAGNOSIS — N30.01 ACUTE CYSTITIS WITH HEMATURIA: Primary | ICD-10-CM

## 2017-08-01 DIAGNOSIS — R30.0 DYSURIA: ICD-10-CM

## 2017-08-01 DIAGNOSIS — R82.90 NONSPECIFIC FINDING ON EXAMINATION OF URINE: ICD-10-CM

## 2017-08-01 LAB
ALBUMIN UR-MCNC: ABNORMAL MG/DL
APPEARANCE UR: ABNORMAL
BACTERIA #/AREA URNS HPF: ABNORMAL /HPF
BILIRUB UR QL STRIP: NEGATIVE
COLOR UR AUTO: YELLOW
GLUCOSE UR STRIP-MCNC: NEGATIVE MG/DL
HGB UR QL STRIP: ABNORMAL
KETONES UR STRIP-MCNC: NEGATIVE MG/DL
LEUKOCYTE ESTERASE UR QL STRIP: ABNORMAL
NITRATE UR QL: NEGATIVE
NON-SQ EPI CELLS #/AREA URNS LPF: ABNORMAL /LPF
PH UR STRIP: 6 PH (ref 5–7)
RBC #/AREA URNS AUTO: ABNORMAL /HPF (ref 0–2)
SP GR UR STRIP: 1.01 (ref 1–1.03)
URN SPEC COLLECT METH UR: ABNORMAL
UROBILINOGEN UR STRIP-ACNC: 0.2 EU/DL (ref 0.2–1)
WBC #/AREA URNS AUTO: ABNORMAL /HPF (ref 0–2)

## 2017-08-01 PROCEDURE — 81001 URINALYSIS AUTO W/SCOPE: CPT | Performed by: FAMILY MEDICINE

## 2017-08-01 PROCEDURE — 87086 URINE CULTURE/COLONY COUNT: CPT | Performed by: FAMILY MEDICINE

## 2017-08-01 PROCEDURE — 99214 OFFICE O/P EST MOD 30 MIN: CPT | Performed by: FAMILY MEDICINE

## 2017-08-01 RX ORDER — NITROFURANTOIN 25; 75 MG/1; MG/1
100 CAPSULE ORAL 2 TIMES DAILY
Qty: 14 CAPSULE | Refills: 0 | Status: SHIPPED | OUTPATIENT
Start: 2017-08-01 | End: 2017-08-14

## 2017-08-01 NOTE — NURSING NOTE
"Chief Complaint   Patient presents with     UTI       Initial /69  Pulse 61  Temp 96.9  F (36.1  C) (Oral)  Ht 5' 4\" (1.626 m)  Wt 169 lb (76.7 kg)  BMI 29.01 kg/m2 Estimated body mass index is 29.01 kg/(m^2) as calculated from the following:    Height as of this encounter: 5' 4\" (1.626 m).    Weight as of this encounter: 169 lb (76.7 kg).  Medication Reconciliation: complete  Dafne Francois , KARLENE     "

## 2017-08-01 NOTE — MR AVS SNAPSHOT
After Visit Summary   8/1/2017    Kelsea Valentine    MRN: [id number]           Patient Information     Date Of Birth          1945        Visit Information        Provider Department      8/1/2017 10:15 AM Maritza Lopez MD [facility]        Today's Diagnoses     Acute cystitis with hematuria    -  1    Dysuria        Nonspecific finding on examination of urine           Follow-ups after your visit        Your next 10 appointments already scheduled     Sep 19, 2017  8:00 AM CDT   LAB with AN LAB   [facility] ([facility])    [address] [facility] [address] [phone]           Patient must bring picture ID. Patient should be prepared to give a urine specimen  Please do not eat 10-12 hours before your appointment if you are coming in fasting for labs on lipids, cholesterol, or glucose (sugar). Pregnant women should follow their Care Team instructions. Water with medications is okay. Do not drink coffee or other fluids. If you have concerns about taking  your medications, please ask at office or if scheduling via [employer], send a message by clicking on Secure Messaging, Message Your Care Team.            Sep 26, 2017 10:15 AM CDT   Office Visit with Maritza Lopez MD   [facility] ([facility])    [address] [facility] [address] [phone]           Bring a current list of meds and any records pertaining to this visit. For Physicals, please bring immunization records and any forms needing to be filled out. Please arrive 10 minutes early to complete paperwork.              Who to contact     If you have questions or need follow up information about today's clinic visit or your schedule please contact [facility] directly at [phone].  Normal or non-critical lab and imaging results will be communicated to you by MyChart, letter or phone within 4 business days after the clinic  "has received the results. If you do not hear from us within 7 days, please contact the clinic through [employer] or phone. If you have a critical or abnormal lab result, we will notify you by phone as soon as possible.  Submit refill requests through [employer] or call your pharmacy and they will forward the refill request to us. Please allow 3 business days for your refill to be completed.          Additional Information About Your Visit        [employer] Information     [employer] lets you send messages to your doctor, view your test results, renew your prescriptions, schedule appointments and more. To sign up, go to [email or web address] . Click on \"Log in\" on the left side of the screen, which will take you to the Welcome page. Then click on \"Sign up Now\" on the right side of the page.     You will be asked to enter the access code listed below, as well as some personal information. Please follow the directions to create your username and password.     Your access code is: [email or web address]  Expires: 10/11/2017  5:31 PM     Your access code will  in 90 days. If you need help or a new code, please call your [facility] clinic or [phone].        Care EveryWhere ID     This is your Care EveryWhere ID. This could be used by other organizations to access your [facility] medical records  [id number]        Your Vitals Were     Pulse Temperature Height BMI (Body Mass Index)          61 96.9  F (36.1  C) (Oral) 5' 4\" (1.626 m) 29.01 kg/m2         Blood Pressure from Last 3 Encounters:   17 136/69   17 120/70   17 125/73    Weight from Last 3 Encounters:   17 169 lb (76.7 kg)   17 170 lb (77.1 kg)   17 161 lb (73 kg)              We Performed the Following     UA with Microscopic reflex to Culture     Urine Culture Aerobic Bacterial          Today's Medication Changes          These changes are accurate as of: 17 11:59 PM.  If you have any questions, ask your nurse or doctor.             "   Start taking these medicines.        Dose/Directions    nitroFURantoin (macrocrystal-monohydrate) 100 MG capsule   Commonly known as:  MACROBID   Used for:  Acute cystitis with hematuria   Started by:  Maritza Lopez MD        Dose:  100 mg   Take 1 capsule (100 mg) by mouth 2 times daily   Quantity:  14 capsule   Refills:  0            Where to get your medicines      These medications were sent to [facility] - [place], [place] - [address] [facility]  [address], [place] [address]     Phone:  [phone]     nitroFURantoin (macrocrystal-monohydrate) 100 MG capsule                Primary Care Provider Office Phone # Fax #    Maritza Lopez [phone] [address] [facility] [address]        Equal Access to Services     [facility]: Hadii latrice burrell hadasho Soomaali, waaxda luqadaha, qaybta kaalmada adeegyada, waxchelsey boone hayjocelyn melissa . So [facility] [phone].    ATENCIÓN: Si habla español, tiene a copeland disposición servicios gratuitos de asistencia lingüística. [facility] [phone].    We comply with applicable federal civil rights laws and [place] laws. We do not discriminate on the basis of race, color, national origin, age, disability sex, sexual orientation or gender identity.            Thank you!     Thank you for choosing [facility]  for your care. Our goal is always to provide you with excellent care. Hearing back from our patients is one way we can continue to improve our services. Please take a few minutes to complete the written survey that you may receive in the mail after your visit with us. Thank you!             Your Updated Medication List - Protect others around you: Learn how to safely use, store and throw away your medicines at www.disposemymeds.org.          This list is accurate as of: 8/1/17 11:59 PM.  Always use your most recent med list.                   Brand Name Dispense Instructions for use Diagnosis     albuterol 108 (90 BASE) MCG/ACT Inhaler    PROAIR HFA/PROVENTIL HFA/VENTOLIN HFA    1 Inhaler    Inhale 2 puffs into the lungs every 6 hours    Pulmonary emphysema, unspecified emphysema type (H)       atorvastatin 20 MG tablet    LIPITOR    90 tablet    Take 1 tablet (20 mg) by mouth daily    Hypertensive hypertrophic cardiomyopathy, without heart failure (H)       benzonatate 200 MG capsule    TESSALON    90 capsule    Take 1 capsule (200 mg) by mouth 3 times daily as needed for cough    Pulmonary emphysema, unspecified emphysema type (H)       CALCIUM 600/VITAMIN D PO      1 everyday        CLARITIN 10 MG tablet   Generic drug:  loratadine      1 TABLET DAILY        FISH OIL PO      one everyday        GLUCOSAMINE 1500 COMPLEX PO      one everyday        irbesartan 300 MG tablet    AVAPRO    90 tablet    Take 1 tablet (300 mg) by mouth daily    Hypertensive hypertrophic cardiomyopathy, without heart failure (H)       moxifloxacin 0.5 % ophthalmic solution    VIGAMOX    1 Bottle    Place 1 drop Into the left eye 3 times daily    Cataract       nitroFURantoin (macrocrystal-monohydrate) 100 MG capsule    MACROBID    14 capsule    Take 1 capsule (100 mg) by mouth 2 times daily    Acute cystitis with hematuria       omeprazole 20 MG tablet     30 tablet    Take 1 tablet by mouth daily. Take 30-60 minutes before a meal.    Cough, GERD (gastroesophageal reflux disease)       ONE-A-DAY WEIGHT SMART ADVANCE PO      1 every other day        prednisoLONE acetate 1 % ophthalmic susp    PRED FORTE    5 mL    Place 1 drop into the right eye 3 times daily    Cataract       TYLENOL CAPS 500 MG OR      1 CAPSULE EVERY 4 HOURS AS NEEDED        umeclidinium 62.5 MCG/INH oral inhaler    INCRUSE ELLIPTA    1 Inhaler    Inhale 1 puff into the lungs daily    Pulmonary emphysema, unspecified emphysema type (H)       VITAMIN C PO      1 everyday        vitamin D 1000 UNITS capsule      Take 1 capsule by mouth daily.

## 2017-08-01 NOTE — LETTER
[facility]  [address] [facility] [facility] [address] [phone]        August 7, 2017    Kelsea Valentine  [address] [address]            Dear Kelsea,    Your urine tests showed a bacteria that should be covered by the antibiotic we have chosen. Please see me if your symptoms persist.     Maritza Lopez MD/kiersten    Results for orders placed or performed in visit on 08/01/17   UA with Microscopic reflex to Culture   Result Value Ref Range    Color Urine Yellow     Appearance Urine Cloudy     Glucose Urine Negative NEG mg/dL    Bilirubin Urine Negative NEG    Ketones Urine Negative NEG mg/dL    Specific Gravity Urine 1.010 1.003 - 1.035    pH Urine 6.0 5.0 - 7.0 pH    Protein Albumin Urine Trace (A) NEG mg/dL    Urobilinogen Urine 0.2 0.2 - 1.0 EU/dL    Nitrite Urine Negative NEG    Blood Urine Moderate (A) NEG    Leukocyte Esterase Urine Large (A) NEG    Source Midstream Urine     WBC Urine  (A) 0 - 2 /HPF    RBC Urine 5-10 (A) 0 - 2 /HPF    Squamous Epithelial /LPF Urine Few FEW /LPF    Bacteria Urine Few (A) NEG /HPF   Urine Culture Aerobic Bacterial   Result Value Ref Range    Specimen Description Midstream Urine     Culture Micro       50,000 to 100,000 colonies/mL mixed urogenital masood    Micro Report Status FINAL 08/02/2017

## 2017-08-02 LAB
BACTERIA SPEC CULT: NORMAL
MICRO REPORT STATUS: NORMAL
SPECIMEN SOURCE: NORMAL

## 2017-08-04 ENCOUNTER — TELEPHONE (OUTPATIENT)
Dept: FAMILY MEDICINE | Facility: CLINIC | Age: 72
End: 2017-08-04

## 2017-08-04 NOTE — TELEPHONE ENCOUNTER
Patient states the Macrobid is working very well for her.   Symptoms have resolved.  FYI to Dr Maritza Lopez.  Kelsey Garcia RN

## 2017-08-04 NOTE — TELEPHONE ENCOUNTER
Reason for Call:  Other     Detailed comments: patient calling to inform pcp that medication is working very well for her    Phone Number Patient can be reached at: Home number on file [phone] (home)    Best Time:     Can we leave a detailed message on this number? YES    Call taken on 8/4/2017 at 9:58 AM by Nicole Linda

## 2017-08-14 ENCOUNTER — OFFICE VISIT (OUTPATIENT)
Dept: FAMILY MEDICINE | Facility: CLINIC | Age: 72
End: 2017-08-14
Payer: COMMERCIAL

## 2017-08-14 DIAGNOSIS — I42.2 HYPERTENSIVE HYPERTROPHIC CARDIOMYOPATHY, WITHOUT HEART FAILURE (H): ICD-10-CM

## 2017-08-14 DIAGNOSIS — R35.0 URINARY FREQUENCY: ICD-10-CM

## 2017-08-14 DIAGNOSIS — I11.9 HYPERTENSIVE HYPERTROPHIC CARDIOMYOPATHY, WITHOUT HEART FAILURE (H): ICD-10-CM

## 2017-08-14 DIAGNOSIS — N95.2 ATROPHIC VAGINITIS: ICD-10-CM

## 2017-08-14 DIAGNOSIS — R82.90 NONSPECIFIC FINDING ON EXAMINATION OF URINE: ICD-10-CM

## 2017-08-14 DIAGNOSIS — I10 ESSENTIAL HYPERTENSION WITH GOAL BLOOD PRESSURE LESS THAN 140/90: ICD-10-CM

## 2017-08-14 DIAGNOSIS — N39.0 RECURRENT UTI (URINARY TRACT INFECTION): Primary | ICD-10-CM

## 2017-08-14 LAB
ALBUMIN UR-MCNC: NEGATIVE MG/DL
APPEARANCE UR: ABNORMAL
BACTERIA #/AREA URNS HPF: ABNORMAL /HPF
BILIRUB UR QL STRIP: NEGATIVE
COLOR UR AUTO: YELLOW
GLUCOSE UR STRIP-MCNC: NEGATIVE MG/DL
HGB UR QL STRIP: ABNORMAL
KETONES UR STRIP-MCNC: NEGATIVE MG/DL
LEUKOCYTE ESTERASE UR QL STRIP: ABNORMAL
NITRATE UR QL: POSITIVE
NON-SQ EPI CELLS #/AREA URNS LPF: ABNORMAL /LPF
PH UR STRIP: 7 PH (ref 5–7)
RBC #/AREA URNS AUTO: ABNORMAL /HPF (ref 0–2)
SP GR UR STRIP: 1.01 (ref 1–1.03)
URN SPEC COLLECT METH UR: ABNORMAL
UROBILINOGEN UR STRIP-ACNC: 0.2 EU/DL (ref 0.2–1)
WBC #/AREA URNS AUTO: >100 /HPF (ref 0–2)

## 2017-08-14 PROCEDURE — 81001 URINALYSIS AUTO W/SCOPE: CPT | Performed by: FAMILY MEDICINE

## 2017-08-14 PROCEDURE — 87186 SC STD MICRODIL/AGAR DIL: CPT | Performed by: FAMILY MEDICINE

## 2017-08-14 PROCEDURE — 87086 URINE CULTURE/COLONY COUNT: CPT | Performed by: FAMILY MEDICINE

## 2017-08-14 PROCEDURE — 87088 URINE BACTERIA CULTURE: CPT | Performed by: FAMILY MEDICINE

## 2017-08-14 PROCEDURE — 99214 OFFICE O/P EST MOD 30 MIN: CPT | Performed by: FAMILY MEDICINE

## 2017-08-14 RX ORDER — ESTRADIOL 0.1 MG/G
CREAM VAGINAL
Qty: 42.5 G | Refills: 11 | Status: SHIPPED | OUTPATIENT
Start: 2017-08-14 | End: 2020-08-19

## 2017-08-14 RX ORDER — LEVOFLOXACIN 250 MG/1
250 TABLET, FILM COATED ORAL DAILY
Qty: 10 TABLET | Refills: 0 | Status: SHIPPED | OUTPATIENT
Start: 2017-08-14 | End: 2018-07-31

## 2017-08-14 NOTE — MR AVS SNAPSHOT
After Visit Summary   8/14/2017    Kelsea Valentine    MRN: [id number]           Patient Information     Date Of Birth          1945        Visit Information        Provider Department      8/14/2017 12:00 PM Maritza Lopez MD [facility]        Today's Diagnoses     Recurrent UTI (urinary tract infection)    -  1    Urinary frequency        Nonspecific finding on examination of urine        Atrophic vaginitis          Care Instructions      Start vaginal cream each night for 2 weeks.      Take all antibiotic until all gone.              Follow-ups after your visit        Additional Services     UROLOGY ADULT REFERRAL       Your provider has referred you to: [facility]: [facility] [place] [phone]   [email or web address]    Please be aware that coverage of these services is subject to the terms and limitations of your health insurance plan.  Call member services at your health plan with any benefit or coverage questions.      Please bring the following with you to your appointment:    (1) Any X-Rays, CTs or MRIs which have been performed.  Contact the facility where they were done to arrange for  prior to your scheduled appointment.    (2) List of current medications  (3) This referral request   (4) Any documents/labs given to you for this referral                  Your next 10 appointments already scheduled     Sep 19, 2017  8:00 AM CDT   LAB with AN LAB   [facility] ([facility])    [address] [facility] [address] [phone]           Patient must bring picture ID. Patient should be prepared to give a urine specimen  Please do not eat 10-12 hours before your appointment if you are coming in fasting for labs on lipids, cholesterol, or glucose (sugar). Pregnant women should follow their Care Team instructions. Water with medications is okay. Do not drink coffee or other fluids. If you have concerns  "about taking  your medications, please ask at office or if scheduling via [employer], send a message by clicking on Secure Messaging, Message Your Care Team.            Sep 26, 2017 10:15 AM CDT   Office Visit with Maritza Lopez MD   [facility] ([facility]    [address] [facility] [address] [phone]           Bring a current list of meds and any records pertaining to this visit. For Physicals, please bring immunization records and any forms needing to be filled out. Please arrive 10 minutes early to complete paperwork.              Who to contact     If you have questions or need follow up information about today's clinic visit or your schedule please contact [facility] directly at [phone].  Normal or non-critical lab and imaging results will be communicated to you by [employer], letter or phone within 4 business days after the clinic has received the results. If you do not hear from us within 7 days, please contact the clinic through [employer] or phone. If you have a critical or abnormal lab result, we will notify you by phone as soon as possible.  Submit refill requests through [employer] or call your pharmacy and they will forward the refill request to us. Please allow 3 business days for your refill to be completed.          Additional Information About Your Visit        [employer] Information     [employer] lets you send messages to your doctor, view your test results, renew your prescriptions, schedule appointments and more. To sign up, go to [email or web address] . Click on \"Log in\" on the left side of the screen, which will take you to the Welcome page. Then click on \"Sign up Now\" on the right side of the page.     You will be asked to enter the access code listed below, as well as some personal information. Please follow the directions to create your username and password.     Your access code is: [email or web address]  Expires: 10/11/2017  5:31 PM     Your " access code will  in 90 days. If you need help or a new code, please call your [facility] clinic or [phone].        Care EveryWhere ID     This is your Care EveryWhere ID. This could be used by other organizations to access your [facility] medical records  [id number]         Blood Pressure from Last 3 Encounters:   17 136/69   17 120/70   17 125/73    Weight from Last 3 Encounters:   17 169 lb (76.7 kg)   17 170 lb (77.1 kg)   17 161 lb (73 kg)              We Performed the Following     UA with Microscopic reflex to Culture     Urine Culture Aerobic Bacterial     UROLOGY ADULT REFERRAL          Today's Medication Changes          These changes are accurate as of: 17 12:26 PM.  If you have any questions, ask your nurse or doctor.               Start taking these medicines.        Dose/Directions    estradiol 0.1 MG/GM cream   Commonly known as:  ESTRACE VAGINAL   Used for:  Atrophic vaginitis        Use 2 grams vaginally every night for 2 weeks then use twice weekly for maintenancy   Quantity:  42.5 g   Refills:  11       levofloxacin 250 MG tablet   Commonly known as:  LEVAQUIN   Used for:  Recurrent UTI (urinary tract infection)        Dose:  250 mg   Take 1 tablet (250 mg) by mouth daily   Quantity:  10 tablet   Refills:  0            Where to get your medicines      These medications were sent to [facility]  - [place], [place] [address] [facility]   [facility] [address]     Phone:  [phone]     estradiol 0.1 MG/GM cream    levofloxacin 250 MG tablet                Primary Care Provider Office Phone # Fax #    Maritza Lopez [phone] [address] [facility] [address]        Equal Access to Services     [facility]: Jignesh Anderson, waanmol palacio, qaaguilar kawiley rogers, gena mosley. So [facility] [phone].    ATENCIÓN: Si zaina andersen, kaitlyn humphreys copeland  disposición servicios gratuitos de asistencia lingüística. Justa jarvis [phone].    We comply with applicable federal civil rights laws and [place] laws. We do not discriminate on the basis of race, color, national origin, age, disability sex, sexual orientation or gender identity.            Thank you!     Thank you for choosing [facility]  [facility] your [facility]. Our goal is always to provide you with excellent care. Hearing back from our patients is one way we can continue to improve our services. Please take a few minutes to complete the written survey that you may receive in the mail after your visit with us. Thank you!             Your Updated Medication List - Protect others around you: Learn how to safely use, store and throw away your medicines at www.disposemymeds.org.          This list is accurate as of: 8/14/17 12:26 PM.  Always use your most recent med list.                   Brand Name Dispense Instructions for use Diagnosis    albuterol 108 (90 BASE) MCG/ACT Inhaler    PROAIR HFA/PROVENTIL HFA/VENTOLIN HFA    1 Inhaler    Inhale 2 puffs into the lungs every 6 hours    Pulmonary emphysema, unspecified emphysema type (H)       amLODIPine 5 MG tablet    NORVASC    90 tablet    Take 1 tablet (5 mg) by mouth daily    Essential hypertension with goal blood pressure less than 140/90, Hypertensive hypertrophic cardiomyopathy, without heart failure (H)       atorvastatin 20 MG tablet    LIPITOR    90 tablet    Take 1 tablet (20 mg) by mouth daily    Hypertensive hypertrophic cardiomyopathy, without heart failure (H)       benzonatate 200 MG capsule    TESSALON    90 capsule    Take 1 capsule (200 mg) by mouth 3 times daily as needed for cough    Pulmonary emphysema, unspecified emphysema type (H)       CALCIUM 600/VITAMIN D PO      1 everyday        CLARITIN 10 MG tablet   Generic drug:  loratadine      1 TABLET DAILY        estradiol 0.1 MG/GM cream    ESTRACE VAGINAL    42.5 g    Use 2 grams  vaginally every night for 2 weeks then use twice weekly for maintenancy    Atrophic vaginitis       FISH OIL PO      one everyday        GLUCOSAMINE 1500 COMPLEX PO      one everyday        irbesartan 300 MG tablet    AVAPRO    90 tablet    Take 1 tablet (300 mg) by mouth daily    Hypertensive hypertrophic cardiomyopathy, without heart failure (H)       levofloxacin 250 MG tablet    LEVAQUIN    10 tablet    Take 1 tablet (250 mg) by mouth daily    Recurrent UTI (urinary tract infection)       metoprolol 100 MG 24 hr tablet    TOPROL-XL    90 tablet    Take 1 tablet (100 mg) by mouth daily    Essential hypertension with goal blood pressure less than 140/90, Hypertensive hypertrophic cardiomyopathy, without heart failure (H)       moxifloxacin 0.5 % ophthalmic solution    VIGAMOX    1 Bottle    Place 1 drop Into the left eye 3 times daily    Cataract       omeprazole 20 MG tablet     30 tablet    Take 1 tablet by mouth daily. Take 30-60 minutes before a meal.    Cough, GERD (gastroesophageal reflux disease)       ONE-A-DAY WEIGHT SMART ADVANCE PO      1 every other day        prednisoLONE acetate 1 % ophthalmic susp    PRED FORTE    5 mL    Place 1 drop into the right eye 3 times daily    Cataract       TYLENOL CAPS 500 MG OR      1 CAPSULE EVERY 4 HOURS AS NEEDED        umeclidinium 62.5 MCG/INH oral inhaler    INCRUSE ELLIPTA    1 Inhaler    Inhale 1 puff into the lungs daily    Pulmonary emphysema, unspecified emphysema type (H)       VITAMIN C PO      1 everyday        vitamin D 1000 UNITS capsule      Take 1 capsule by mouth daily.

## 2017-08-14 NOTE — PROGRESS NOTES
SUBJECTIVE:                                                    Kelsea Valentine is a 72 year old female who presents to clinic today for the following health issues:      URINARY TRACT SYMPTOMS      Duration: ongoing x 2 episodes     Description  dysuria and cloudy     Intensity:  moderate    Accompanying signs and symptoms:  Fever/chills: no   Flank pain no   Nausea and vomiting: no   Vaginal symptoms: none  Abdominal/Pelvic Pain: no     History  History of frequent UTI's: YES  History of kidney stones: no   Sexually Active: YES has been 3 weeks   Possibility of pregnancy: Yes    Precipitating or alleviating factors: None    Therapies tried and outcome: course of antibiotics - Macrobid    Outcome: not effective     Has been treated 7/13/17 and 8/1/17 for UTI, each time symptoms resolved on abx but then recurred the day after stopping.  7/13 grew E.coli, 8/1 mixed growth.  Treated with bactrim DS first then macrobid.    Now with symptoms again. No change in sex activity, no med changes, no diarrheal illness.        Problem list and histories reviewed & adjusted, as indicated.  Additional history: as documented    Patient Active Problem List   Diagnosis     Hyperlipidemia LDL goal <100     Hypertensive hypertrophic cardiomyopathy (H)     Mitral valve insufficiency     Hypertension goal BP (blood pressure) < 140/90     CKD (chronic kidney disease) stage 3, GFR 30-59 ml/min     GERD (gastroesophageal reflux disease)     COPD (chronic obstructive pulmonary disease) (H)     Abnormal glucose     Advanced directives, counseling/discussion     Pseudophakia, ou     Past Surgical History:   Procedure Laterality Date     CATARACT IOL, RT/LT       CHOLECYSTECTOMY, OPEN       COLONOSCOPY N/A 7/27/2015    Procedure: COLONOSCOPY;  Surgeon: Dilip Quintero MD;  Location: [facility]     COLONOSCOPY WITH CO2 INSUFFLATION N/A 7/27/2015    Procedure: COLONOSCOPY WITH CO2 INSUFFLATION;  Surgeon: Dilip Quintero MD;   Location: [facility] OR     HC TOOTH EXTRACTION W/FORCEP      false teeth     NON-SURGICAL SEPTAL REDUCTION THERAPY W/ CORONARY ARTERIOGRAMS, W/WO TEMPORARY PACEMAKER       PHACOEMULSIFICATION WITH STANDARD INTRAOCULAR LENS IMPLANT  03/2017; 3/2017    left eye; right eye     SURGICAL HISTORY OF -   04/2010    mitral valvuloplasty [place]       Social History   Substance Use Topics     Smoking status: Current Every Day Smoker     Packs/day: 0.50     Types: Cigarettes     Last attempt to quit: 3/15/2010     Smokeless tobacco: Never Used      Comment:  smokes     Alcohol use No     Family History   Problem Relation Age of Onset     CANCER Mother      stomach,bone     Alcohol/Drug Mother      smoker     C.A.D. Father      CANCER Father      Alcohol/Drug Father      smoker     Hypertension Father      HEART DISEASE Sister      Lipids Daughter      HEART DISEASE Sister      Breast Cancer Sister 64     Other Cancer Sister      bone , kidney     CEREBROVASCULAR DISEASE Brother      Cardiovascular Brother      DIABETES Brother      Hypertension Brother      Hypertension Brother      Cancer - colorectal Daughter 41     Thyroid Disease No family hx of      Glaucoma No family hx of      Macular Degeneration No family hx of          Current Outpatient Prescriptions   Medication Sig Dispense Refill     levofloxacin (LEVAQUIN) 250 MG tablet Take 1 tablet (250 mg) by mouth daily 10 tablet 0     estradiol (ESTRACE VAGINAL) 0.1 MG/GM cream Use 2 grams vaginally every night for 2 weeks then use twice weekly for maintenancy 42.5 g 11     metoprolol (TOPROL-XL) 100 MG 24 hr tablet TAKE 1 TABLET EVERY DAY 90 tablet 0     amLODIPine (NORVASC) 5 MG tablet TAKE 1 TABLET EVERY DAY 90 tablet 0     albuterol (PROAIR HFA/PROVENTIL HFA/VENTOLIN HFA) 108 (90 BASE) MCG/ACT Inhaler Inhale 2 puffs into the lungs every 6 hours 1 Inhaler 2     umeclidinium (INCRUSE ELLIPTA) 62.5 MCG/INH oral inhaler Inhale 1 puff into the lungs daily 1 Inhaler 2      irbesartan (AVAPRO) 300 MG tablet Take 1 tablet (300 mg) by mouth daily 90 tablet 1     benzonatate (TESSALON) 200 MG capsule Take 1 capsule (200 mg) by mouth 3 times daily as needed for cough 90 capsule 3     prednisoLONE acetate (PRED FORTE) 1 % ophthalmic susp Place 1 drop into the right eye 3 times daily 5 mL 0     moxifloxacin (VIGAMOX) 0.5 % ophthalmic solution Place 1 drop Into the left eye 3 times daily 1 Bottle 0     atorvastatin (LIPITOR) 20 MG tablet Take 1 tablet (20 mg) by mouth daily 90 tablet 3     omeprazole 20 MG tablet Take 1 tablet by mouth daily. Take 30-60 minutes before a meal. 30 tablet 1     Cholecalciferol (VITAMIN D) 1000 UNIT capsule Take 1 capsule by mouth daily.       CLARITIN 10 MG OR TABS 1 TABLET DAILY       TYLENOL CAPS 500 MG OR 1 CAPSULE EVERY 4 HOURS AS NEEDED       CALCIUM 600/VITAMIN D OR 1 everyday       VITAMIN C OR 1 everyday       ONE-A-DAY WEIGHT SMART ADVANCE OR 1 every other day       GLUCOSAMINE 1500 COMPLEX OR one everyday       FISH OIL OR one everyday       BP Readings from Last 3 Encounters:   08/01/17 136/69   07/13/17 120/70   03/28/17 125/73    Wt Readings from Last 3 Encounters:   08/01/17 169 lb (76.7 kg)   07/13/17 170 lb (77.1 kg)   03/28/17 161 lb (73 kg)                  Labs reviewed in EPIC        Reviewed and updated as needed this visit by clinical staffAllergies  Meds  Problems       Reviewed and updated as needed this visit by Provider  Allergies  Meds  Problems         ROS:  Constitutional, HEENT, cardiovascular, pulmonary, gi and gu systems are negative, except as otherwise noted.      OBJECTIVE:   There were no vitals taken for this visit.  There is no height or weight on file to calculate BMI.  GENERAL: healthy, alert and no distress  EYES: Eyes grossly normal to inspection, PERRL and conjunctivae and sclerae normal  ABDOMEN: soft, nontender, no hepatosplenomegaly, no masses and bowel sounds normal  MS: no gross musculoskeletal defects noted,  no edema  SKIN: no suspicious lesions or rashes  PSYCH: mentation appears normal, affect normal/bright    Diagnostic Test Results:  Urinalysis - UA RESULTS:  Recent Labs   Lab Test  08/14/17   1156   COLOR  Yellow   APPEARANCE  Cloudy   URINEGLC  Negative   URINEBILI  Negative   URINEKETONE  Negative   SG  1.010   UBLD  Trace*   URINEPH  7.0   PROTEIN  Negative   UROBILINOGEN  0.2   NITRITE  Positive*   LEUKEST  Large*   RBCU  5-10*   WBCU  >100*          ASSESSMENT/PLAN:     (N39.0) Recurrent UTI (urinary tract infection)  (primary encounter diagnosis)  (R35.0) Urinary frequency  (R82.90) Nonspecific finding on examination of urine  Comment: recurrent, no clear trigger suspect vaginal atrophy  Plan: levofloxacin (LEVAQUIN) 250 MG tablet, UROLOGY         ADULT REFERRAL, JESUSITA        UA with Microscopic reflex to Culture        Urine Culture Aerobic Bacterial        Treat with levaquin x 10 days  Refer to urology      (N95.2) Atrophic vaginitis  Comment: continue   Plan: estradiol (ESTRACE VAGINAL) 0.1 MG/GM cream        Trial of estrace cream      See Patient Instructions    Maritza Lopez MD  [facility]

## 2017-08-14 NOTE — LETTER
[facility]  [address] [facility] [address] [phone]    August 16, 2017    K[place] Valentine  [address] [address]      Dear Kelsea,    Your urine tests showed a bacteria that should be covered by the antibiotic we have chosen. Please see be sure to make an appointment with the specialist.     Maritza Lopez MD/kiersten    Results for orders placed or performed in visit on 08/14/17   UA with Microscopic reflex to Culture   Result Value Ref Range    Color Urine Yellow     Appearance Urine Cloudy     Glucose Urine Negative NEG mg/dL    Bilirubin Urine Negative NEG    Ketones Urine Negative NEG mg/dL    Specific Gravity Urine 1.010 1.003 - 1.035    pH Urine 7.0 5.0 - 7.0 pH    Protein Albumin Urine Negative NEG mg/dL    Urobilinogen Urine 0.2 0.2 - 1.0 EU/dL    Nitrite Urine Positive (A) NEG    Blood Urine Trace (A) NEG    Leukocyte Esterase Urine Large (A) NEG    Source Midstream Urine     WBC Urine >100 (A) 0 - 2 /HPF    RBC Urine 5-10 (A) 0 - 2 /HPF    Squamous Epithelial /LPF Urine Few FEW /LPF    Bacteria Urine Moderate (A) NEG /HPF   Urine Culture Aerobic Bacterial   Result Value Ref Range    Specimen Description Midstream Urine     Culture Micro >100,000 colonies/mL  Escherichia coli   (A)    JESUSITA   Result Value Ref Range    Ampicillin <=2 (S) ug/mL    Cefazolin <=4 (S) ug/mL    Cefoxitin <=4 (S) ug/mL    Ceftazidime <=1 (S) ug/mL    Ceftriaxone <=1 (S) ug/mL    Ciprofloxacin <=0.25 (S) ug/mL    Gentamicin <=1 (S) ug/mL    Levofloxacin <=0.12 (S) ug/mL    Nitrofurantoin <=16 (S) ug/mL    Tobramycin <=1 (S) ug/mL    Trimethoprim/Sulfa <=1/19 (S) ug/mL    Ampicillin/Sulbactam <=2 (S) ug/mL    Piperacillin/Tazo <=4 (S) ug/mL    Amikacin <=2 (S) ug/mL    Cefepime <=1 (S) ug/mL    Meropenem <=0.25 (S) ug/mL    Ext Spect B Lac Prod Negative (S)

## 2017-08-15 RX ORDER — METOPROLOL SUCCINATE 100 MG/1
TABLET, EXTENDED RELEASE ORAL
Qty: 90 TABLET | Refills: 0 | Status: SHIPPED | OUTPATIENT
Start: 2017-08-15 | End: 2017-09-26

## 2017-08-15 RX ORDER — AMLODIPINE BESYLATE 5 MG/1
TABLET ORAL
Qty: 90 TABLET | Refills: 0 | Status: SHIPPED | OUTPATIENT
Start: 2017-08-15 | End: 2017-09-26

## 2017-08-16 LAB
AMIKACIN SUSC ISLT: <=2 UG/ML
AMPICILLIN SUSC ISLT: <=2 UG/ML
AMPICILLIN+SULBAC SUSC ISLT: <=2 UG/ML
B-LACTAMASE EXTENDED SUSC ISLT: NEGATIVE
BACTERIA SPEC CULT: ABNORMAL
CEFAZOLIN SUSC ISLT: <=4 UG/ML
CEFEPIME SUSC ISLT: <=1 UG/ML
CEFOXITIN SUSC ISLT: <=4 UG/ML
CEFTAZIDIME SUSC ISLT: <=1 UG/ML
CEFTRIAXONE SUSC ISLT: <=1 UG/ML
CIPROFLOXACIN SUSC ISLT: <=0.25 UG/ML
GENTAMICIN SUSC ISLT: <=1 UG/ML
LEVOFLOXACIN SUSC ISLT: <=0.12 UG/ML
MEROPENEM SUSC ISLT: <=0.25 UG/ML
NITROFURANTOIN SUSC ISLT: <=16 UG/ML
PIP+TAZO SUSC ISLT: <=4 UG/ML
SPECIMEN SOURCE: ABNORMAL
TMP SMX SUSC ISLT: ABNORMAL UG/ML
TOBRAMYCIN SUSC ISLT: <=1 UG/ML

## 2017-08-28 DIAGNOSIS — I42.2 HYPERTENSIVE HYPERTROPHIC CARDIOMYOPATHY, WITHOUT HEART FAILURE (H): ICD-10-CM

## 2017-08-28 DIAGNOSIS — I11.9 HYPERTENSIVE HYPERTROPHIC CARDIOMYOPATHY, WITHOUT HEART FAILURE (H): ICD-10-CM

## 2017-08-30 RX ORDER — IRBESARTAN 300 MG/1
TABLET ORAL
Qty: 90 TABLET | Refills: 0 | Status: SHIPPED | OUTPATIENT
Start: 2017-08-30 | End: 2017-09-26

## 2017-09-19 ENCOUNTER — DOCUMENTATION ONLY (OUTPATIENT)
Dept: LAB | Facility: CLINIC | Age: 72
End: 2017-09-19

## 2017-09-19 DIAGNOSIS — E78.5 HYPERLIPIDEMIA LDL GOAL <100: Primary | ICD-10-CM

## 2017-09-19 DIAGNOSIS — N18.30 CKD (CHRONIC KIDNEY DISEASE) STAGE 3, GFR 30-59 ML/MIN (H): Primary | ICD-10-CM

## 2017-09-19 DIAGNOSIS — I10 HYPERTENSION GOAL BP (BLOOD PRESSURE) < 140/90: ICD-10-CM

## 2017-09-19 DIAGNOSIS — N18.30 CKD (CHRONIC KIDNEY DISEASE) STAGE 3, GFR 30-59 ML/MIN (H): ICD-10-CM

## 2017-09-19 LAB
ANION GAP SERPL CALCULATED.3IONS-SCNC: 5 MMOL/L (ref 3–14)
BUN SERPL-MCNC: 23 MG/DL (ref 7–30)
CALCIUM SERPL-MCNC: 9.4 MG/DL (ref 8.5–10.1)
CHLORIDE SERPL-SCNC: 103 MMOL/L (ref 94–109)
CO2 SERPL-SCNC: 29 MMOL/L (ref 20–32)
CREAT SERPL-MCNC: 1.22 MG/DL (ref 0.52–1.04)
ERYTHROCYTE [DISTWIDTH] IN BLOOD BY AUTOMATED COUNT: 13.4 % (ref 10–15)
GFR SERPL CREATININE-BSD FRML MDRD: 44 ML/MIN/1.7M2
GLUCOSE SERPL-MCNC: 106 MG/DL (ref 70–99)
HCT VFR BLD AUTO: 45.4 % (ref 35–47)
HGB BLD-MCNC: 15.3 G/DL (ref 11.7–15.7)
MCH RBC QN AUTO: 32.8 PG (ref 26.5–33)
MCHC RBC AUTO-ENTMCNC: 33.7 G/DL (ref 31.5–36.5)
MCV RBC AUTO: 97 FL (ref 78–100)
PLATELET # BLD AUTO: 327 10E9/L (ref 150–450)
POTASSIUM SERPL-SCNC: 4.5 MMOL/L (ref 3.4–5.3)
RBC # BLD AUTO: 4.67 10E12/L (ref 3.8–5.2)
SODIUM SERPL-SCNC: 137 MMOL/L (ref 133–144)
WBC # BLD AUTO: 8.3 10E9/L (ref 4–11)

## 2017-09-19 PROCEDURE — 36415 COLL VENOUS BLD VENIPUNCTURE: CPT | Performed by: FAMILY MEDICINE

## 2017-09-19 PROCEDURE — 85027 COMPLETE CBC AUTOMATED: CPT | Performed by: FAMILY MEDICINE

## 2017-09-19 PROCEDURE — 80048 BASIC METABOLIC PNL TOTAL CA: CPT | Performed by: FAMILY MEDICINE

## 2017-09-19 NOTE — PROGRESS NOTES
Your patient was in for lab test today and I noticed that she is due for a hgb per health maintenance. I drew JIC tube in case you wanted this test.  Please tag any orders to the lab appointment or enter orders as a future and I will watch for them.  Thank you   Josefina   @ [facility]

## 2017-09-25 NOTE — PROGRESS NOTES
SUBJECTIVE:   Kelsea Valentine is a 72 year old female who presents to clinic today for the following health issues:        Hyperlipidemia Follow-Up      Rate your low fat/cholesterol diet?: fair    Taking statin?  Yes, leg and foot cramps    Other lipid medications/supplements?:  none    Hypertension Follow-up      Outpatient blood pressures are being checked at home.  Results are 140/80.    Low Salt Diet: low salt    Hypertension ROS: taking medications as instructed, no medication side effects noted, no TIA's, no chest pain on exertion, no dyspnea on exertion, no swelling of ankles.  No headache or visual changes.           Amount of exercise or physical activity: 2-3 days/week for an average of 30-45 minutes    Problems taking medications regularly: No    Medication side effects: none  Diet: low salt, low fat/cholesterol and diabetic      COPD Follow-Up    Symptoms are currently: improved    Current fatigue or dyspnea with ambulation: better than usual    Shortness of breath: stable    Increased or change in Cough/Sputum: No    Fever(s): No    Baseline ambulation without stopping to rest:  1 blocks. Able to walk up 2 flights of stairs without stopping to rest.    Any ER/UC or hospital admissions since your last visit? No     History   Smoking Status     Current Every Day Smoker     Packs/day: 0.50     Types: Cigarettes     Last attempt to quit: 3/15/2010   Smokeless Tobacco     Never Used     Comment:  smokes     Lab Results   Component Value Date    FEV1 1.16 03/24/2014    AWB2ICE 43% 03/24/2014           Problem list and histories reviewed & adjusted, as indicated.  Additional history: as documented    Patient Active Problem List   Diagnosis     Hyperlipidemia LDL goal <100     Hypertensive hypertrophic cardiomyopathy (H)     Mitral valve insufficiency     Hypertension goal BP (blood pressure) < 140/90     CKD (chronic kidney disease) stage 3, GFR 30-59 ml/min     GERD (gastroesophageal reflux disease)      COPD (chronic obstructive pulmonary disease) (H)     Abnormal glucose     Advanced directives, counseling/discussion     Pseudophakia, ou     Benign neoplasm of colon, unspecified part of colon     Past Surgical History:   Procedure Laterality Date     CATARACT IOL, RT/LT       CHOLECYSTECTOMY, OPEN       COLONOSCOPY N/A 7/27/2015    Procedure: COLONOSCOPY;  Surgeon: Dilip Quintero MD;  Location: [facility] OR     COLONOSCOPY WITH CO2 INSUFFLATION N/A 7/27/2015    Procedure: COLONOSCOPY WITH CO2 INSUFFLATION;  Surgeon: Dilip Quintero MD;  Location: [facility] OR     HC TOOTH EXTRACTION W/FORCEP      false teeth     NON-SURGICAL SEPTAL REDUCTION THERAPY W/ CORONARY ARTERIOGRAMS, W/WO TEMPORARY PACEMAKER       PHACOEMULSIFICATION WITH STANDARD INTRAOCULAR LENS IMPLANT  03/2017; 3/2017    left eye; right eye     SURGICAL HISTORY OF -   04/2010    mitral valvuloplasty [place]       Social History   Substance Use Topics     Smoking status: Current Every Day Smoker     Packs/day: 0.50     Types: Cigarettes     Last attempt to quit: 3/15/2010     Smokeless tobacco: Never Used      Comment:  smokes     Alcohol use No     Family History   Problem Relation Age of Onset     CANCER Mother      stomach,bone     Alcohol/Drug Mother      smoker     C.A.D. Father      CANCER Father      Alcohol/Drug Father      smoker     Hypertension Father      HEART DISEASE Sister      Lipids Daughter      HEART DISEASE Sister      Breast Cancer Sister 64     Other Cancer Sister      bone , kidney     CEREBROVASCULAR DISEASE Brother      Cardiovascular Brother      DIABETES Brother      Hypertension Brother      Hypertension Brother      Cancer - colorectal Daughter 41     Thyroid Disease No family hx of      Glaucoma No family hx of      Macular Degeneration No family hx of          Current Outpatient Prescriptions   Medication Sig Dispense Refill     irbesartan (AVAPRO) 300 MG tablet TAKE 1 TABLET EVERY DAY 90 tablet 0     metoprolol  (TOPROL-XL) 100 MG 24 hr tablet TAKE 1 TABLET EVERY DAY 90 tablet 0     amLODIPine (NORVASC) 5 MG tablet TAKE 1 TABLET EVERY DAY 90 tablet 0     albuterol (PROAIR HFA/PROVENTIL HFA/VENTOLIN HFA) 108 (90 BASE) MCG/ACT Inhaler Inhale 2 puffs into the lungs every 6 hours 1 Inhaler 2     umeclidinium (INCRUSE ELLIPTA) 62.5 MCG/INH oral inhaler Inhale 1 puff into the lungs daily 1 Inhaler 2     atorvastatin (LIPITOR) 20 MG tablet Take 1 tablet (20 mg) by mouth daily 90 tablet 3     levofloxacin (LEVAQUIN) 250 MG tablet Take 1 tablet (250 mg) by mouth daily 10 tablet 0     estradiol (ESTRACE VAGINAL) 0.1 MG/GM cream Use 2 grams vaginally every night for 2 weeks then use twice weekly for maintenancy 42.5 g 11     benzonatate (TESSALON) 200 MG capsule Take 1 capsule (200 mg) by mouth 3 times daily as needed for cough 90 capsule 3     prednisoLONE acetate (PRED FORTE) 1 % ophthalmic susp Place 1 drop into the right eye 3 times daily 5 mL 0     moxifloxacin (VIGAMOX) 0.5 % ophthalmic solution Place 1 drop Into the left eye 3 times daily 1 Bottle 0     omeprazole 20 MG tablet Take 1 tablet by mouth daily. Take 30-60 minutes before a meal. 30 tablet 1     Cholecalciferol (VITAMIN D) 1000 UNIT capsule Take 1 capsule by mouth daily.       CLARITIN 10 MG OR TABS 1 TABLET DAILY       TYLENOL CAPS 500 MG OR 1 CAPSULE EVERY 4 HOURS AS NEEDED       CALCIUM 600/VITAMIN D OR 1 everyday       VITAMIN C OR 1 everyday       ONE-A-DAY WEIGHT SMART ADVANCE OR 1 every other day       GLUCOSAMINE 1500 COMPLEX OR one everyday       FISH OIL OR one everyday       BP Readings from Last 3 Encounters:   09/26/17 140/79   08/01/17 136/69   07/13/17 120/70    Wt Readings from Last 3 Encounters:   09/26/17 169 lb (76.7 kg)   08/01/17 169 lb (76.7 kg)   07/13/17 170 lb (77.1 kg)                  Labs reviewed in EPIC        Reviewed and updated as needed this visit by clinical staffTobacco  Allergies  Meds  Problems  Med Hx  Surg Hx  Fam Hx  Soc  "Hx        Reviewed and updated as needed this visit by Provider  Allergies  Meds  Problems         ROS:  Constitutional, HEENT, cardiovascular, pulmonary, gi and gu systems are negative, except as otherwise noted.      OBJECTIVE:   /79  Pulse 60  Temp 96.8  F (36  C) (Oral)  Ht 5' 4\" (1.626 m)  Wt 169 lb (76.7 kg)  SpO2 97%  BMI 29.01 kg/m2  Body mass index is 29.01 kg/(m^2).  GENERAL: healthy, alert and no distress  EYES: Eyes grossly normal to inspection, PERRL and conjunctivae and sclerae normal  NECK: no adenopathy, no asymmetry, masses, or scars and thyroid normal to palpation  RESP: lungs clear to auscultation - no rales, rhonchi or wheezes  CV: regular rate and rhythm, normal S1 S2, no S3 or S4, no murmur, click or rub, no peripheral edema and peripheral pulses strong  MS: no gross musculoskeletal defects noted, no edema  SKIN: no suspicious lesions or rashes  PSYCH: mentation appears normal, affect normal/bright    Diagnostic Test Results:  none     ASSESSMENT/PLAN:     (I11.9,  I42.2) Hypertensive hypertrophic cardiomyopathy, without heart failure (H)  (primary encounter diagnosis)  Comment: stable  Plan: irbesartan (AVAPRO) 300 MG tablet, metoprolol         (TOPROL-XL) 100 MG 24 hr tablet, amLODIPine         (NORVASC) 5 MG tablet, atorvastatin (LIPITOR)         20 MG tablet         Refill x 6 months f/u 6 months for OV and labs - wellness exam    (I10) Hypertension goal BP (blood pressure) < 150/90  Comment: to goal  Plan: metoprolol (TOPROL-XL) 100 MG 24 hr tablet,         amLODIPine (NORVASC) 5 MG tablet         Refill x 6 months     (J43.9) Pulmonary emphysema, unspecified emphysema type (H)  Comment: controlled, better on incruse  Plan: albuterol (PROAIR HFA/PROVENTIL HFA/VENTOLIN         HFA) 108 (90 BASE) MCG/ACT Inhaler,         umeclidinium (INCRUSE ELLIPTA) 62.5 MCG/INH         oral inhaler         Refill x 6 months       See Patient Instructions    Maritza Lopez MD  [place] " [facility]

## 2017-09-26 ENCOUNTER — OFFICE VISIT (OUTPATIENT)
Dept: FAMILY MEDICINE | Facility: CLINIC | Age: 72
End: 2017-09-26
Payer: COMMERCIAL

## 2017-09-26 VITALS
BODY MASS INDEX: 28.85 KG/M2 | SYSTOLIC BLOOD PRESSURE: 140 MMHG | HEIGHT: 64 IN | OXYGEN SATURATION: 97 % | HEART RATE: 60 BPM | DIASTOLIC BLOOD PRESSURE: 79 MMHG | TEMPERATURE: 96.8 F | WEIGHT: 169 LBS

## 2017-09-26 DIAGNOSIS — I42.2 HYPERTENSIVE HYPERTROPHIC CARDIOMYOPATHY, WITHOUT HEART FAILURE (H): Primary | ICD-10-CM

## 2017-09-26 DIAGNOSIS — I11.9 HYPERTENSIVE HYPERTROPHIC CARDIOMYOPATHY, WITHOUT HEART FAILURE (H): Primary | ICD-10-CM

## 2017-09-26 DIAGNOSIS — J43.9 PULMONARY EMPHYSEMA, UNSPECIFIED EMPHYSEMA TYPE (H): ICD-10-CM

## 2017-09-26 DIAGNOSIS — I10 HYPERTENSION GOAL BP (BLOOD PRESSURE) < 150/90: ICD-10-CM

## 2017-09-26 PROBLEM — D12.6 BENIGN NEOPLASM OF COLON, UNSPECIFIED PART OF COLON: Status: ACTIVE | Noted: 2017-09-26

## 2017-09-26 PROCEDURE — 99214 OFFICE O/P EST MOD 30 MIN: CPT | Performed by: FAMILY MEDICINE

## 2017-09-26 RX ORDER — IRBESARTAN 300 MG/1
TABLET ORAL
Qty: 90 TABLET | Refills: 0 | Status: SHIPPED | OUTPATIENT
Start: 2017-09-26 | End: 2018-01-23

## 2017-09-26 RX ORDER — METOPROLOL SUCCINATE 100 MG/1
TABLET, EXTENDED RELEASE ORAL
Qty: 90 TABLET | Refills: 0 | Status: SHIPPED | OUTPATIENT
Start: 2017-09-26 | End: 2018-01-23

## 2017-09-26 RX ORDER — ALBUTEROL SULFATE 90 UG/1
2 AEROSOL, METERED RESPIRATORY (INHALATION) EVERY 6 HOURS
Qty: 1 INHALER | Refills: 2 | Status: SHIPPED | OUTPATIENT
Start: 2017-09-26 | End: 2018-01-23

## 2017-09-26 RX ORDER — ATORVASTATIN CALCIUM 20 MG/1
20 TABLET, FILM COATED ORAL DAILY
Qty: 90 TABLET | Refills: 3 | Status: SHIPPED | OUTPATIENT
Start: 2017-09-26 | End: 2018-07-31

## 2017-09-26 RX ORDER — AMLODIPINE BESYLATE 5 MG/1
TABLET ORAL
Qty: 90 TABLET | Refills: 0 | Status: SHIPPED | OUTPATIENT
Start: 2017-09-26 | End: 2018-01-23

## 2017-09-26 NOTE — NURSING NOTE
"Chief Complaint   Patient presents with     Hypertension     Lipids       Initial /79  Pulse 60  Temp 96.8  F (36  C) (Oral)  Ht 5' 4\" (1.626 m)  Wt 169 lb (76.7 kg)  SpO2 97%  BMI 29.01 kg/m2 Estimated body mass index is 29.01 kg/(m^2) as calculated from the following:    Height as of this encounter: 5' 4\" (1.626 m).    Weight as of this encounter: 169 lb (76.7 kg).  Medication Reconciliation: complete  Elvia Phillips M.A.    "

## 2017-09-26 NOTE — MR AVS SNAPSHOT
"              After Visit Summary   9/26/2017    Kelsea Valentine    MRN: [id number]           Patient Information     Date Of Birth          1945        Visit Information        Provider Department      9/26/2017 10:15 AM Maritza Lopez MD [facility]        Today's Diagnoses     Hypertensive hypertrophic cardiomyopathy, without heart failure (H)    -  1    Hypertension goal BP (blood pressure) < 150/90        Pulmonary emphysema, unspecified emphysema type (H)        Benign neoplasm of colon, unspecified part of colon          Care Instructions        Next visit in 6 months with labs and wellness exam              Follow-ups after your visit        Who to contact     If you have questions or need follow up information about today's clinic visit or your schedule please contact [facility] directly at [phone].  Normal or non-critical lab and imaging results will be communicated to you by MyChart, letter or phone within 4 business days after the clinic has received the results. If you do not hear from us within 7 days, please contact the clinic through MyChart or phone. If you have a critical or abnormal lab result, we will notify you by phone as soon as possible.  Submit refill requests through [email or web address] or call your pharmacy and they will forward the refill request to us. Please allow 3 business days for your refill to be completed.          Additional Information About Your Visit        [employer] Information     [email or web address] lets you send messages to your doctor, view your test results, renew your prescriptions, schedule appointments and more. To sign up, go to [email or web address] . Click on \"Log in\" on the left side of the screen, which will take you to the Welcome page. Then click on \"Sign up Now\" on the right side of the page.     You will be asked to enter the access code listed below, as well as some personal information. Please follow the directions to create your username " "and password.     Your access code is: [email or web address]  Expires: 10/11/2017  5:31 PM     Your access code will  in 90 days. If you need help or a new code, please call your [facility] clinic or [phone].        Care EveryWhere ID     This is your Care EveryWhere ID. This could be used by other organizations to access your [facility] medical records  [id number]        Your Vitals Were     Pulse Temperature Height Pulse Oximetry BMI (Body Mass Index)       60 96.8  F (36  C) (Oral) 5' 4\" (1.626 m) 97% 29.01 kg/m2        Blood Pressure from Last 3 Encounters:   17 140/79   17 136/69   17 120/70    Weight from Last 3 Encounters:   17 169 lb (76.7 kg)   17 169 lb (76.7 kg)   17 170 lb (77.1 kg)              Today, you had the following     No orders found for display         Today's Medication Changes          These changes are accurate as of: 17 10:42 AM.  If you have any questions, ask your nurse or doctor.               These medicines have changed or have updated prescriptions.        Dose/Directions    amLODIPine 5 MG tablet   Commonly known as:  NORVASC   This may have changed:  See the new instructions.   Used for:  Hypertension goal BP (blood pressure) < 150/90, Hypertensive hypertrophic cardiomyopathy, without heart failure (H)   Changed by:  Maritza Lopez MD        TAKE 1 TABLET EVERY DAY   Quantity:  90 tablet   Refills:  0       irbesartan 300 MG tablet   Commonly known as:  AVAPRO   This may have changed:  See the new instructions.   Used for:  Hypertensive hypertrophic cardiomyopathy, without heart failure (H)   Changed by:  Maritza Lopez MD        TAKE 1 TABLET EVERY DAY   Quantity:  90 tablet   Refills:  0       metoprolol 100 MG 24 hr tablet   Commonly known as:  TOPROL-XL   This may have changed:  See the new instructions.   Used for:  Hypertension goal BP (blood pressure) < 150/90, Hypertensive hypertrophic cardiomyopathy, without heart " failure (H)   Changed by:  Maritza Lopez MD        TAKE 1 TABLET EVERY DAY   Quantity:  90 tablet   Refills:  0            Where to get your medicines      These medications were sent to [facility] - [place], [place] - [address]  [address], [facility] [address]     Phone:  [phone]     amLODIPine 5 MG tablet    atorvastatin 20 MG tablet    irbesartan 300 MG tablet    metoprolol 100 MG 24 hr tablet         These medications were sent to [facility] [address] [place], [place] - [address]  [address] [address]     Phone:  [phone]     albuterol 108 (90 BASE) MCG/ACT Inhaler    umeclidinium 62.5 MCG/INH oral inhaler                Primary Care Provider Office Phone # Fax #    Maritza Lopez [phone] [address] [facility] [address]        Equal Access to Services     [facility] AH: Hadii aad ku hadasho Soomaali, waaxda luqadaha, qaybta kaalmada adeegyada, waxay idiin hayaan kelleneg kharash lakaylin . So [facility] [phone].    ATENCIÓN: Si habla español, tiene a copeland disposición servicios gratuitos de asistencia lingüística. Llame al [phone].    We comply with applicable federal civil rights laws and [place] laws. We do not discriminate on the basis of race, color, national origin, age, disability sex, sexual orientation or gender identity.            Thank you!     Thank you for choosing [facility]  for your care. Our goal is always to provide you with excellent care. Hearing back from our patients is one way we can continue to improve our services. Please take a few minutes to complete the written survey that you may receive in the mail after your visit with us. Thank you!             Your Updated Medication List - Protect others around you: Learn how to safely use, store and throw away your medicines at www.disposemymeds.org.          This list is accurate as of: 9/26/17 10:42 AM.  Always  use your most recent med list.                   Brand Name Dispense Instructions for use Diagnosis    albuterol 108 (90 BASE) MCG/ACT Inhaler    PROAIR HFA/PROVENTIL HFA/VENTOLIN HFA    1 Inhaler    Inhale 2 puffs into the lungs every 6 hours    Pulmonary emphysema, unspecified emphysema type (H)       amLODIPine 5 MG tablet    NORVASC    90 tablet    TAKE 1 TABLET EVERY DAY    Hypertension goal BP (blood pressure) < 150/90, Hypertensive hypertrophic cardiomyopathy, without heart failure (H)       atorvastatin 20 MG tablet    LIPITOR    90 tablet    Take 1 tablet (20 mg) by mouth daily    Hypertensive hypertrophic cardiomyopathy, without heart failure (H)       benzonatate 200 MG capsule    TESSALON    90 capsule    Take 1 capsule (200 mg) by mouth 3 times daily as needed for cough    Pulmonary emphysema, unspecified emphysema type (H)       CALCIUM 600/VITAMIN D PO      1 everyday        CLARITIN 10 MG tablet   Generic drug:  loratadine      1 TABLET DAILY        estradiol 0.1 MG/GM cream    ESTRACE VAGINAL    42.5 g    Use 2 grams vaginally every night for 2 weeks then use twice weekly for maintenancy    Atrophic vaginitis       FISH OIL PO      one everyday        GLUCOSAMINE 1500 COMPLEX PO      one everyday        irbesartan 300 MG tablet    AVAPRO    90 tablet    TAKE 1 TABLET EVERY DAY    Hypertensive hypertrophic cardiomyopathy, without heart failure (H)       levofloxacin 250 MG tablet    LEVAQUIN    10 tablet    Take 1 tablet (250 mg) by mouth daily    Recurrent UTI (urinary tract infection)       metoprolol 100 MG 24 hr tablet    TOPROL-XL    90 tablet    TAKE 1 TABLET EVERY DAY    Hypertension goal BP (blood pressure) < 150/90, Hypertensive hypertrophic cardiomyopathy, without heart failure (H)       moxifloxacin 0.5 % ophthalmic solution    VIGAMOX    1 Bottle    Place 1 drop Into the left eye 3 times daily    Cataract       omeprazole 20 MG tablet     30 tablet    Take 1 tablet by mouth daily. Take  30-60 minutes before a meal.    Cough, GERD (gastroesophageal reflux disease)       ONE-A-DAY WEIGHT SMART ADVANCE PO      1 every other day        prednisoLONE acetate 1 % ophthalmic susp    PRED FORTE    5 mL    Place 1 drop into the right eye 3 times daily    Cataract       TYLENOL CAPS 500 MG OR      1 CAPSULE EVERY 4 HOURS AS NEEDED        umeclidinium 62.5 MCG/INH oral inhaler    INCRUSE ELLIPTA    1 Inhaler    Inhale 1 puff into the lungs daily    Pulmonary emphysema, unspecified emphysema type (H)       VITAMIN C PO      1 everyday        vitamin D 1000 UNITS capsule      Take 1 capsule by mouth daily.

## 2017-11-29 ENCOUNTER — TRANSFERRED RECORDS (OUTPATIENT)
Dept: HEALTH INFORMATION MANAGEMENT | Facility: CLINIC | Age: 72
End: 2017-11-29

## 2017-12-27 ENCOUNTER — DOCUMENTATION ONLY (OUTPATIENT)
Dept: LAB | Facility: CLINIC | Age: 72
End: 2017-12-27

## 2017-12-27 DIAGNOSIS — I10 HYPERTENSION GOAL BP (BLOOD PRESSURE) < 140/90: ICD-10-CM

## 2017-12-27 DIAGNOSIS — E78.5 HYPERLIPIDEMIA LDL GOAL <100: Primary | ICD-10-CM

## 2017-12-27 NOTE — PROGRESS NOTES
This patient is scheduled for lab work on 1/2/2018 but does not qualify for pre-visit protocol. Please place future orders, or have your care team call and advise patient to cancel lab appointment. She has an appointment with Dr. Lopez on 1/9/2018.    Thank you,    [facility]

## 2017-12-28 NOTE — TELEPHONE ENCOUNTER
Lab is requesting orders for this patient, for fasting labs on 01/02/2017, Last office visit notes states to come back in 6 months, and does have an apt in March for that, was scheduled last visit in September. Does this patient need to be seen this soon? Also has Physical scheduled 01/09/2017

## 2017-12-29 NOTE — PROGRESS NOTES
Spoke to patient's  who states they recently changed insurances and their insurance wants these labs done as close to the first of the year as possible. Please sign pended labs.    Brenda Ferreira,

## 2018-01-02 DIAGNOSIS — I10 HYPERTENSION GOAL BP (BLOOD PRESSURE) < 140/90: ICD-10-CM

## 2018-01-02 DIAGNOSIS — E78.5 HYPERLIPIDEMIA LDL GOAL <100: ICD-10-CM

## 2018-01-02 LAB
ANION GAP SERPL CALCULATED.3IONS-SCNC: 7 MMOL/L (ref 3–14)
BUN SERPL-MCNC: 15 MG/DL (ref 7–30)
CALCIUM SERPL-MCNC: 9.5 MG/DL (ref 8.5–10.1)
CHLORIDE SERPL-SCNC: 103 MMOL/L (ref 94–109)
CHOLEST SERPL-MCNC: 157 MG/DL
CO2 SERPL-SCNC: 28 MMOL/L (ref 20–32)
CREAT SERPL-MCNC: 1.09 MG/DL (ref 0.52–1.04)
GFR SERPL CREATININE-BSD FRML MDRD: 49 ML/MIN/1.7M2
GLUCOSE SERPL-MCNC: 124 MG/DL (ref 70–99)
HDLC SERPL-MCNC: 49 MG/DL
LDLC SERPL CALC-MCNC: 73 MG/DL
NONHDLC SERPL-MCNC: 108 MG/DL
POTASSIUM SERPL-SCNC: 4.8 MMOL/L (ref 3.4–5.3)
SODIUM SERPL-SCNC: 138 MMOL/L (ref 133–144)
TRIGL SERPL-MCNC: 176 MG/DL

## 2018-01-02 PROCEDURE — 80061 LIPID PANEL: CPT | Performed by: FAMILY MEDICINE

## 2018-01-02 PROCEDURE — 80048 BASIC METABOLIC PNL TOTAL CA: CPT | Performed by: FAMILY MEDICINE

## 2018-01-02 PROCEDURE — 36415 COLL VENOUS BLD VENIPUNCTURE: CPT | Performed by: FAMILY MEDICINE

## 2018-01-23 ENCOUNTER — OFFICE VISIT (OUTPATIENT)
Dept: FAMILY MEDICINE | Facility: CLINIC | Age: 73
End: 2018-01-23
Payer: COMMERCIAL

## 2018-01-23 ENCOUNTER — RADIANT APPOINTMENT (OUTPATIENT)
Dept: GENERAL RADIOLOGY | Facility: CLINIC | Age: 73
End: 2018-01-23
Attending: FAMILY MEDICINE
Payer: COMMERCIAL

## 2018-01-23 VITALS
BODY MASS INDEX: 28.17 KG/M2 | OXYGEN SATURATION: 94 % | DIASTOLIC BLOOD PRESSURE: 69 MMHG | HEIGHT: 64 IN | TEMPERATURE: 97.4 F | WEIGHT: 165 LBS | HEART RATE: 62 BPM | SYSTOLIC BLOOD PRESSURE: 137 MMHG

## 2018-01-23 DIAGNOSIS — I10 HYPERTENSION GOAL BP (BLOOD PRESSURE) < 150/90: ICD-10-CM

## 2018-01-23 DIAGNOSIS — R05.9 COUGH: ICD-10-CM

## 2018-01-23 DIAGNOSIS — I42.2 HYPERTENSIVE HYPERTROPHIC CARDIOMYOPATHY, WITHOUT HEART FAILURE (H): ICD-10-CM

## 2018-01-23 DIAGNOSIS — J43.9 PULMONARY EMPHYSEMA, UNSPECIFIED EMPHYSEMA TYPE (H): ICD-10-CM

## 2018-01-23 DIAGNOSIS — R73.01 ELEVATED FASTING GLUCOSE: ICD-10-CM

## 2018-01-23 DIAGNOSIS — Z00.00 MEDICARE ANNUAL WELLNESS VISIT, SUBSEQUENT: Primary | ICD-10-CM

## 2018-01-23 DIAGNOSIS — I11.9 HYPERTENSIVE HYPERTROPHIC CARDIOMYOPATHY, WITHOUT HEART FAILURE (H): ICD-10-CM

## 2018-01-23 LAB — HBA1C MFR BLD: 6.3 % (ref 4.3–6)

## 2018-01-23 PROCEDURE — 36415 COLL VENOUS BLD VENIPUNCTURE: CPT | Performed by: FAMILY MEDICINE

## 2018-01-23 PROCEDURE — 71046 X-RAY EXAM CHEST 2 VIEWS: CPT | Mod: FY

## 2018-01-23 PROCEDURE — 83036 HEMOGLOBIN GLYCOSYLATED A1C: CPT | Performed by: FAMILY MEDICINE

## 2018-01-23 PROCEDURE — G0439 PPPS, SUBSEQ VISIT: HCPCS | Performed by: FAMILY MEDICINE

## 2018-01-23 RX ORDER — AMLODIPINE BESYLATE 5 MG/1
TABLET ORAL
Qty: 90 TABLET | Refills: 1 | Status: SHIPPED | OUTPATIENT
Start: 2018-01-23 | End: 2018-07-31

## 2018-01-23 RX ORDER — ALBUTEROL SULFATE 90 UG/1
2 AEROSOL, METERED RESPIRATORY (INHALATION) EVERY 6 HOURS PRN
Qty: 1 INHALER | Refills: 0 | Status: SHIPPED | OUTPATIENT
Start: 2018-01-23 | End: 2018-07-31

## 2018-01-23 RX ORDER — METOPROLOL SUCCINATE 100 MG/1
TABLET, EXTENDED RELEASE ORAL
Qty: 90 TABLET | Refills: 1 | Status: SHIPPED | OUTPATIENT
Start: 2018-01-23 | End: 2018-07-31

## 2018-01-23 RX ORDER — ALBUTEROL SULFATE 90 UG/1
2 AEROSOL, METERED RESPIRATORY (INHALATION) EVERY 6 HOURS
Qty: 3 INHALER | Refills: 1 | Status: SHIPPED | OUTPATIENT
Start: 2018-01-23 | End: 2018-06-06

## 2018-01-23 RX ORDER — IRBESARTAN 300 MG/1
TABLET ORAL
Qty: 90 TABLET | Refills: 1 | Status: SHIPPED | OUTPATIENT
Start: 2018-01-23 | End: 2018-07-31

## 2018-01-23 ASSESSMENT — PATIENT HEALTH QUESTIONNAIRE - PHQ9: SUM OF ALL RESPONSES TO PHQ QUESTIONS 1-9: 0

## 2018-01-23 ASSESSMENT — ACTIVITIES OF DAILY LIVING (ADL): I_NEED_ASSISTANCE_FOR_THE_FOLLOWING_DAILY_ACTIVITIES:: NO ASSISTANCE IS NEEDED

## 2018-01-23 NOTE — MR AVS SNAPSHOT
After Visit Summary   1/23/2018    Kelsea Valentine    MRN: [id number]           Patient Information     Date Of Birth          1945        Visit Information        Provider Department      1/23/2018 3:00 PM Maritza Lopez MD [facility]        Today's Diagnoses     Medicare annual wellness visit, subsequent    -  1    Hypertensive hypertrophic cardiomyopathy, without heart failure (H)        Pulmonary emphysema, unspecified emphysema type (H)        Hypertension goal BP (blood pressure) < 150/90        Cough        Elevated fasting glucose          Care Instructions      Preventive Health Recommendations    Female Ages 65 +    Yearly exam:     See your health care provider every year in order to  o Review health changes.   o Discuss preventive care.    o Review your medicines if your doctor has prescribed any.      You no longer need a yearly Pap test unless you've had an abnormal Pap test in the past 10 years. If you have vaginal symptoms, such as bleeding or discharge, be sure to talk with your provider about a Pap test.      Every 1 to 2 years, have a mammogram.  If you are over 69, talk with your health care provider about whether or not you want to continue having screening mammograms.      Every 10 years, have a colonoscopy. Or, have a yearly FIT test (stool test). These exams will check for colon cancer.       Have a cholesterol test every 5 years, or more often if your doctor advises it.       Have a diabetes test (fasting glucose) every three years. If you are at risk for diabetes, you should have this test more often.       At age 65, have a bone density scan (DEXA) to check for osteoporosis (brittle bone disease).    Shots:    Get a flu shot each year.    Get a tetanus shot every 10 years.    Talk to your doctor about your pneumonia vaccines. There are now two you should receive - Pneumovax (PPSV 23) and Prevnar (PCV 13).    Talk to your doctor about the shingles  vaccine.    Talk to your doctor about the hepatitis B vaccine.    Nutrition:     Eat at least 5 servings of fruits and vegetables each day.      Eat whole-grain bread, whole-wheat pasta and brown rice instead of white grains and rice.      Talk to your provider about Calcium and Vitamin D.     Lifestyle    Exercise at least 150 minutes a week (30 minutes a day, 5 days a week). This will help you control your weight and prevent disease.      Limit alcohol to one drink per day.      No smoking.       Wear sunscreen to prevent skin cancer.       See your dentist twice a year for an exam and cleaning.      See your eye doctor every 1 to 2 years to screen for conditions such as glaucoma, macular degeneration and cataracts.          Follow-ups after your visit        Your next 10 appointments already scheduled     Mar 13, 2018  8:00 AM CDT   LAB with AN LAB   [facility]    [address] [address] [phone]           Please do not eat 10-12 hours before your appointment if you are coming in fasting for labs on lipids, cholesterol, or glucose (sugar). This does not apply to pregnant women. Water, hot tea and black coffee (with nothing added) are okay. Do not drink other fluids, diet soda or chew gum.            Mar 21, 2018 10:15 AM CDT   Office Visit with Maritza Lopez MD   [facility]    [address] [facility] [address] [phone]           Bring a current list of meds and any records pertaining to this visit. For Physicals, please bring immunization records and any forms needing to be filled out. Please arrive 10 minutes early to complete paperwork.            Jul 24, 2018  1:00 PM CDT   New Visit with Davi Mosquera MD   [facility] ([facility])    [address]  [place] [address] [phone]              Who to contact     If you have questions or  "need follow up information about today's clinic visit or your schedule please contact [facility] directly at [phone].  Normal or non-critical lab and imaging results will be communicated to you by MyChart, letter or phone within 4 business days after the clinic has received the results. If you do not hear from us within 7 days, please contact the clinic through [employer] or phone. If you have a critical or abnormal lab result, we will notify you by phone as soon as possible.  Submit refill requests through [employer] or call your pharmacy and they will forward the refill request to us. Please allow 3 business days for your refill to be completed.          Additional Information About Your Visit        [employer] Information     [employer] lets you send messages to your doctor, view your test results, renew your prescriptions, schedule appointments and more. To sign up, go to [email or web address] . Click on \"Log in\" on the left side of the screen, which will take you to the Welcome page. Then click on \"Sign up Now\" on the right side of the page.     You will be asked to enter the access code listed below, as well as some personal information. Please follow the directions to create your username and password.     Your access code is: [email or web address]  Expires: 4/15/2018  7:40 AM     Your access code will  in 90 days. If you need help or a new code, please call your [facility] clinic or [phone].        Care EveryWhere ID     This is your Care EveryWhere ID. This could be used by other organizations to access your [facility] medical records  [id number]        Your Vitals Were     Pulse Temperature Height Pulse Oximetry BMI (Body Mass Index)       62 97.4  F (36.3  C) (Oral) 5' 4\" (1.626 m) 94% 28.32 kg/m2        Blood Pressure from Last 3 Encounters:   18 137/69   17 140/79   17 136/69    Weight from Last 3 Encounters:   18 165 lb (74.8 kg)   17 169 lb (76.7 kg)   17 " 169 lb (76.7 kg)              We Performed the Following     Hemoglobin A1c          Today's Medication Changes          These changes are accurate as of 1/23/18 11:59 PM.  If you have any questions, ask your nurse or doctor.               These medicines have changed or have updated prescriptions.        Dose/Directions    * albuterol 108 (90 BASE) MCG/ACT Inhaler   Commonly known as:  PROAIR HFA/PROVENTIL HFA/VENTOLIN HFA   This may have changed:  Another medication with the same name was added. Make sure you understand how and when to take each.   Used for:  Pulmonary emphysema, unspecified emphysema type (H)   Changed by:  Maritza Lopez MD        Dose:  2 puff   Inhale 2 puffs into the lungs every 6 hours   Quantity:  3 Inhaler   Refills:  1       * albuterol 108 (90 BASE) MCG/ACT Inhaler   Commonly known as:  PROAIR HFA/PROVENTIL HFA/VENTOLIN HFA   This may have changed:  You were already taking a medication with the same name, and this prescription was added. Make sure you understand how and when to take each.   Used for:  Pulmonary emphysema, unspecified emphysema type (H)   Changed by:  Maritza Lopez MD        Dose:  2 puff   Inhale 2 puffs into the lungs every 6 hours as needed for shortness of breath / dyspnea or wheezing   Quantity:  1 Inhaler   Refills:  0       * Notice:  This list has 2 medication(s) that are the same as other medications prescribed for you. Read the directions carefully, and ask your doctor or other care provider to review them with you.         Where to get your medicines      These medications were sent to [facility] [address] [place] [facility] - [place], [place] - [address] [facility] [address]     Phone:  [phone]     albuterol 108 (90 BASE) MCG/ACT Inhaler         [facility] [place], [place] - [address] AT Portal to [facility]  [address], [facility] [address]      Phone:  [phone]     albuterol 108 (90 BASE) MCG/ACT Inhaler    amLODIPine 5 MG tablet    irbesartan 300 MG tablet    metoprolol succinate 100 MG 24 hr tablet    umeclidinium 62.5 MCG/INH oral inhaler                Primary Care Provider Office Phone # Fax #    Maritza Belkys Lopez [phone] [address] [place] [address] [address]        Equal Access to Services     [facility] : Hadii aad ku hadasho Soomaali, waaxda luqadaha, qaybta kaalmada adeegyada, waxay idiin hayaan adeeg kharash la'hinan . So [facility] [phone].    ATENCIÓN: Si habla español, tiene a copeland disposición servicios gratuitos de asistencia lingüística. [facility] [phone].    We comply with applicable federal civil rights laws and [place] laws. We do not discriminate on the basis of race, color, national origin, age, disability, sex, sexual orientation, or gender identity.            Thank you!     Thank you for choosing [facility]  for your care. Our goal is always to provide you with excellent care. Hearing back from our patients is one way we can continue to improve our services. Please take a few minutes to complete the written survey that you may receive in the mail after your visit with us. Thank you!             Your Updated Medication List - Protect others around you: Learn how to safely use, store and throw away your medicines at www.disposemymeds.org.          This list is accurate as of 1/23/18 11:59 PM.  Always use your most recent med list.                   Brand Name Dispense Instructions for use Diagnosis    * albuterol 108 (90 BASE) MCG/ACT Inhaler    PROAIR HFA/PROVENTIL HFA/VENTOLIN HFA    3 Inhaler    Inhale 2 puffs into the lungs every 6 hours    Pulmonary emphysema, unspecified emphysema type (H)       * albuterol 108 (90 BASE) MCG/ACT Inhaler    PROAIR HFA/PROVENTIL HFA/VENTOLIN HFA    1 Inhaler    Inhale 2 puffs into the lungs every 6 hours as needed for shortness of breath /  dyspnea or wheezing    Pulmonary emphysema, unspecified emphysema type (H)       amLODIPine 5 MG tablet    NORVASC    90 tablet    TAKE 1 TABLET EVERY DAY    Hypertension goal BP (blood pressure) < 150/90, Hypertensive hypertrophic cardiomyopathy, without heart failure (H)       atorvastatin 20 MG tablet    LIPITOR    90 tablet    Take 1 tablet (20 mg) by mouth daily    Hypertensive hypertrophic cardiomyopathy, without heart failure (H)       benzonatate 200 MG capsule    TESSALON    90 capsule    Take 1 capsule (200 mg) by mouth 3 times daily as needed for cough    Pulmonary emphysema, unspecified emphysema type (H)       CALCIUM 600/VITAMIN D PO      1 everyday        CLARITIN 10 MG tablet   Generic drug:  loratadine      1 TABLET DAILY        estradiol 0.1 MG/GM cream    ESTRACE VAGINAL    42.5 g    Use 2 grams vaginally every night for 2 weeks then use twice weekly for maintenancy    Atrophic vaginitis       FISH OIL PO      one everyday        GLUCOSAMINE 1500 COMPLEX PO      one everyday        irbesartan 300 MG tablet    AVAPRO    90 tablet    TAKE 1 TABLET EVERY DAY    Hypertensive hypertrophic cardiomyopathy, without heart failure (H)       levofloxacin 250 MG tablet    LEVAQUIN    10 tablet    Take 1 tablet (250 mg) by mouth daily    Recurrent UTI (urinary tract infection)       metoprolol succinate 100 MG 24 hr tablet    TOPROL-XL    90 tablet    TAKE 1 TABLET EVERY DAY    Hypertension goal BP (blood pressure) < 150/90, Hypertensive hypertrophic cardiomyopathy, without heart failure (H)       moxifloxacin 0.5 % ophthalmic solution    VIGAMOX    1 Bottle    Place 1 drop Into the left eye 3 times daily    Cataract       omeprazole 20 MG tablet     30 tablet    Take 1 tablet by mouth daily. Take 30-60 minutes before a meal.    Cough, GERD (gastroesophageal reflux disease)       ONE-A-DAY WEIGHT SMART ADVANCE PO      1 every other day        prednisoLONE acetate 1 % ophthalmic susp    PRED FORTE    5 mL     Place 1 drop into the right eye 3 times daily    Cataract       TYLENOL CAPS 500 MG OR      1 CAPSULE EVERY 4 HOURS AS NEEDED        umeclidinium 62.5 MCG/INH oral inhaler    INCRUSE ELLIPTA    1 Inhaler    Inhale 1 puff into the lungs daily    Pulmonary emphysema, unspecified emphysema type (H)       VITAMIN C PO      1 everyday        vitamin D 1000 UNITS capsule      Take 1 capsule by mouth daily.        * Notice:  This list has 2 medication(s) that are the same as other medications prescribed for you. Read the directions carefully, and ask your doctor or other care provider to review them with you.

## 2018-01-23 NOTE — NURSING NOTE
"Chief Complaint   Patient presents with     Physical       Initial /69  Pulse 62  Temp 97.4  F (36.3  C) (Oral)  Ht 5' 4\" (1.626 m)  Wt 165 lb (74.8 kg)  SpO2 94%  BMI 28.32 kg/m2 Estimated body mass index is 28.32 kg/(m^2) as calculated from the following:    Height as of this encounter: 5' 4\" (1.626 m).    Weight as of this encounter: 165 lb (74.8 kg).  Medication Reconciliation: complete    Fina Brock MA    "

## 2018-01-23 NOTE — PROGRESS NOTES
SUBJECTIVE:   Kelsea Valentine is a 72 year old female who presents for Preventive Visit.    Are you in the first 12 months of your Medicare Part B coverage?  No    Healthy Habits:    Do you get at least three servings of calcium containing foods daily (dairy, green leafy vegetables, etc.)? yes    Amount of exercise or daily activities, outside of work: 0    Problems taking medications regularly No    Medication side effects: No    Have you had an eye exam in the past two years? no    Do you see a dentist twice per year? no    Do you have sleep apnea, excessive snoring or daytime drowsiness?no      Ability to successfully perform activities of daily living: No, needs assistance with: none    Home safety:  none identified     Hearing impairment: Yes, Need to ask people to speak up or repeat themselves.    Fall risk:     Patient has had a productive cough since November. The cough has been progressively getting better but she is still coughing up white phlegm occassionally. Patient had a head cold back in November but her sore and nasal congestion have since resolved. She denies chest pain and reports some shortness of breath but no more than normal for her COPD.     COGNITIVE SCREEN  1) Repeat 3 items (Banana, Sunrise, Chair)    2) Clock draw: NORMAL  3) 3 item recall: Recalls 3 objects  Results: 3 items recalled: COGNITIVE IMPAIRMENT LESS LIKELY    Mini-CogTM Copyright S Ange. Licensed by the author for use in [facility]; reprinted with permission (prateek@.[email or web address]). All rights reserved.        Reviewed and updated as needed this visit by clinical staffTobacco  Allergies       Reviewed and updated as needed this visit by Provider        Social History   Substance Use Topics     Smoking status: Current Every Day Smoker     Packs/day: 0.50     Types: Cigarettes     Last attempt to quit: 3/15/2010     Smokeless tobacco: Never Used      Comment:  smokes     Alcohol use No       If you drink alcohol do  you typically have >3 drinks per day or >7 drinks per week? No                        Today's PHQ-2 Score:   PHQ-2 ( 1999 [employer]) 1/23/2018 9/24/2014   Q1: Little interest or pleasure in doing things - 0   Q2: Feeling down, depressed or hopeless - 2   PHQ-2 Score - 2   Q1: Little interest or pleasure in doing things Nearly every day -   Q2: Feeling down, depressed or hopeless Not at all -   PHQ-2 Score 3 -         Do you feel safe in your environment - Yes    Do you have a Health Care Directive?: No    Current providers sharing in care for this patient include: Patient Care Team:  Maritza Lopez MD as PCP - General (Family Practice)    The following health maintenance items are reviewed in Epic and correct as of today:  Health Maintenance   Topic Date Due     FALL RISK ASSESSMENT  09/27/2017     MICROALBUMIN Q1 YEAR  03/21/2018     COPD ACTION PLAN Q1 YR  03/28/2018     BMP Q6 MOS  07/02/2018     EYE EXAM Q1 YEAR  07/24/2018     HEMOGLOBIN Q1 YR  09/19/2018     MAMMO SCREEN Q2 YR (SYSTEM ASSIGNED)  12/08/2018     LIPID MONITORING Q1 YEAR  01/02/2019     COLONOSCOPY Q2 YR  09/26/2019     ADVANCE DIRECTIVE PLANNING Q5 YRS  09/30/2020     TETANUS IMMUNIZATION (SYSTEM ASSIGNED)  08/08/2021     PNEUMO 5YR BOOST DUE AFTER AGE 65 (NO IB MSG)  Completed     INFLUENZA VACCINE (SYSTEM ASSIGNED)  Addressed     SPIROMETRY ONETIME  Completed     DEXA SCAN SCREENING (SYSTEM ASSIGNED)  Addressed     PNEUMOCOCCAL  Completed     HEPATITIS C SCREENING  Completed     BP Readings from Last 3 Encounters:   01/23/18 137/69   09/26/17 140/79   08/01/17 136/69    Wt Readings from Last 3 Encounters:   01/23/18 165 lb (74.8 kg)   09/26/17 169 lb (76.7 kg)   08/01/17 169 lb (76.7 kg)               Current Outpatient Prescriptions   Medication Sig Dispense Refill     irbesartan (AVAPRO) 300 MG tablet TAKE 1 TABLET EVERY DAY 90 tablet 1     metoprolol succinate (TOPROL-XL) 100 MG 24 hr tablet TAKE 1 TABLET EVERY DAY 90 tablet 1      amLODIPine (NORVASC) 5 MG tablet TAKE 1 TABLET EVERY DAY 90 tablet 1     albuterol (PROAIR HFA/PROVENTIL HFA/VENTOLIN HFA) 108 (90 BASE) MCG/ACT Inhaler Inhale 2 puffs into the lungs every 6 hours 3 Inhaler 1     umeclidinium (INCRUSE ELLIPTA) 62.5 MCG/INH oral inhaler Inhale 1 puff into the lungs daily 1 Inhaler 2     albuterol (PROAIR HFA/PROVENTIL HFA/VENTOLIN HFA) 108 (90 BASE) MCG/ACT Inhaler Inhale 2 puffs into the lungs every 6 hours as needed for shortness of breath / dyspnea or wheezing 1 Inhaler 0     benzonatate (TESSALON) 200 MG capsule Take 1 capsule (200 mg) by mouth 3 times daily as needed for cough 90 capsule 3     atorvastatin (LIPITOR) 20 MG tablet Take 1 tablet (20 mg) by mouth daily 90 tablet 3     [DISCONTINUED] irbesartan (AVAPRO) 300 MG tablet TAKE 1 TABLET EVERY DAY 90 tablet 0     [DISCONTINUED] metoprolol (TOPROL-XL) 100 MG 24 hr tablet TAKE 1 TABLET EVERY DAY 90 tablet 0     [DISCONTINUED] amLODIPine (NORVASC) 5 MG tablet TAKE 1 TABLET EVERY DAY 90 tablet 0     [DISCONTINUED] albuterol (PROAIR HFA/PROVENTIL HFA/VENTOLIN HFA) 108 (90 BASE) MCG/ACT Inhaler Inhale 2 puffs into the lungs every 6 hours 1 Inhaler 2     levofloxacin (LEVAQUIN) 250 MG tablet Take 1 tablet (250 mg) by mouth daily 10 tablet 0     estradiol (ESTRACE VAGINAL) 0.1 MG/GM cream Use 2 grams vaginally every night for 2 weeks then use twice weekly for maintenancy 42.5 g 11     prednisoLONE acetate (PRED FORTE) 1 % ophthalmic susp Place 1 drop into the right eye 3 times daily 5 mL 0     moxifloxacin (VIGAMOX) 0.5 % ophthalmic solution Place 1 drop Into the left eye 3 times daily 1 Bottle 0     omeprazole 20 MG tablet Take 1 tablet by mouth daily. Take 30-60 minutes before a meal. 30 tablet 1     Cholecalciferol (VITAMIN D) 1000 UNIT capsule Take 1 capsule by mouth daily.       CLARITIN 10 MG OR TABS 1 TABLET DAILY       TYLENOL CAPS 500 MG OR 1 CAPSULE EVERY 4 HOURS AS NEEDED       CALCIUM 600/VITAMIN D OR 1 everyday        VITAMIN C OR 1 everyday       ONE-A-DAY WEIGHT SMART ADVANCE OR 1 every other day       GLUCOSAMINE 1500 COMPLEX OR one everyday       FISH OIL OR one everyday       Allergies   Allergen Reactions     Lisinopril Swelling     Difficulty swallowing.     Ketorolac Swelling and Other (See Comments)     Eye drops     No Clinical Screening - See Comments Other (See Comments)     Diclofenac Rash     Around the left eye     Nickel Rash     Recent Labs   Lab Test  01/02/18   0818  09/19/17   0742  03/21/17   0749  09/27/16   1155   03/29/16   1150  03/22/16   0811   03/25/15   1431   08/29/12   1104   10/19/10   0908   05/17/10   1151  12/04/09   0905   A1C   --    --    --   6.1*   --   6.3*   --    --   6.3*   --   5.9   < >   --    --    --    --    LDL  73   --   75   --    --    --   80   --    --    < >   --    < >  110   --    --   110   HDL  49*   --   44*   --    --    --   40*   --    --    < >   --    < >  40*   --    --   41*   TRIG  176*   --   250*   --    --    --   209*   --    --    < >   --    < >  191*   --    --   154*   ALT   --    --    --    --    --    --    --    --    --    --    --    --   24   --    --   22   CR  1.09*  1.22*  1.08*   --    < >   --   1.03   < >   --    < >   --    < >   --    < >  1.02  0.98   GFRESTIMATED  49*  44*  50*   --    < >   --   53*   < >   --    < >   --    < >   --    < >  55*  58*   GFRESTBLACK  60*  53*  61   --    < >   --   64   < >   --    < >   --    < >   --    < >  66  70   POTASSIUM  4.8  4.5  4.2   --    < >   --   5.0   < >   --    < >   --    < >   --    < >  5.6*  4.7   TSH   --    --    --    --    --    --    --    --    --    --   1.02   --    --    --   0.62   --     < > = values in this interval not displayed.        Pneumonia Vaccine:completed  Mammogram Screening: Patient over age 50, mutual decision to screen reflected in health maintenance.    ROS:CONSTITUTIONAL:NEGATIVE for fever, chills, change in weight  INTEGUMENTARY/SKIN: NEGATIVE for  "worrisome rashes, moles or lesions  EYES: NEGATIVE for vision changes or irritation  ENT/MOUTH: NEGATIVE for ear, mouth and throat problems  RESP:NEGATIVE for significant cough or SOB and POSITIVE for cough-productive and Hx COPD  CV: NEGATIVE for chest pain, palpitations or peripheral edema  GI: NEGATIVE for nausea, abdominal pain, heartburn, or change in bowel habits  MUSCULOSKELETAL: NEGATIVE for significant arthralgias or myalgia  NEURO: NEGATIVE for weakness, dizziness or paresthesias  ENDOCRINE: NEGATIVE for temperature intolerance, skin/hair changes  PSYCHIATRIC: NEGATIVE for changes in mood or affect    OBJECTIVE:   /69  Pulse 62  Temp 97.4  F (36.3  C) (Oral)  Ht 5' 4\" (1.626 m)  Wt 165 lb (74.8 kg)  SpO2 94%  BMI 28.32 kg/m2 Estimated body mass index is 28.32 kg/(m^2) as calculated from the following:    Height as of this encounter: 5' 4\" (1.626 m).    Weight as of this encounter: 165 lb (74.8 kg).     EXAM:   GENERAL APPEARANCE: healthy, alert and no distress  EYES: Eyes grossly normal to inspection, PERRL and conjunctivae and sclerae normal  HENT: ear canals and TM's normal, nose and mouth without ulcers or lesions, oropharynx clear and oral mucous membranes moist  NECK: no adenopathy, no asymmetry, masses, or scars and thyroid normal to palpation  RESP: lungs clear to auscultation - no rales, rhonchi or wheezes  BREAST: refused exam  CV: regular rate and rhythm, normal S1 S2, no S3 or S4, no murmur, click or rub, no peripheral edema and peripheral pulses strong  ABDOMEN: soft, nontender, no hepatosplenomegaly, no masses and bowel sounds normal  MS: no musculoskeletal defects are noted and gait is age appropriate without ataxia  SKIN: no suspicious lesions or rashes  NEURO: Normal strength and tone, sensory exam grossly normal, mentation intact and speech normal  PSYCH: mentation appears normal and affect normal/bright    ASSESSMENT / PLAN:   (Z00.00) Medicare annual wellness visit, " subsequent  (primary encounter diagnosis)  Comment: Preventative needs reviewed  Plan: Patient was encouraged to quit smoking by her next office visit. Patient was told to follow up in 3 months.  see orders in Epic.     (I11.9,  I42.2) Hypertensive hypertrophic cardiomyopathy, without heart failure (H)  Comment: Patient sees a cardiologist for monitoring and follow up for her hypertrophic cardiomyopathy.  Plan: irbesartan (AVAPRO) 300 MG tablet, metoprolol         succinate (TOPROL-XL) 100 MG 24 hr tablet,         amLODIPine (NORVASC) 5 MG tablet        Patient was told to continue taking irbesartan 300 mg daily, metoprolol succinate 100 mg daily, and amlodipine 5 mg daily for management of her hypertrophic cardiomyopathy.     (J43.9) Pulmonary emphysema, unspecified emphysema type (H)  Comment: Patient currently smokes and notices she is more short of breath when she does not use her inhalers.  Plan: albuterol (PROAIR HFA/PROVENTIL HFA/VENTOLIN         HFA) 108 (90 BASE) MCG/ACT Inhaler,         umeclidinium (INCRUSE ELLIPTA) 62.5 MCG/INH         oral inhaler, albuterol (PROAIR HFA/PROVENTIL         HFA/VENTOLIN HFA) 108 (90 BASE) MCG/ACT Inhaler       Patient was instructed to continue use of her Incruse Ellipta inhaler and albuterol inhaler for management of the pulmonary emphysema. Patient states she will try to quit smoking by her next office visit. Patient was instructed to return for follow up in 3 months    (I10) Hypertension goal BP (blood pressure) < 150/90  Comment: Patient's blood pressure is at goal of less than 150/90  Plan: metoprolol succinate (TOPROL-XL) 100 MG 24 hr         tablet, amLODIPine (NORVASC) 5 MG tablet        Patient was told to continue taking metoprolol 100 mg daily and amlodipine 5 mg daily for management of blood pressure.     (R05) Cough  Comment: Patient has had a mildly productive cough for 3 months. The cough has improved but she still coughs up white phlegm  "occassionally.  Plan: XR Chest 2 Views        Patient's chest x-rays appear clear. Patient was told if her cough worsens at any point she should come back in for a further work up and a chest CT.     (R73.01) Elevated fasting glucose  Comment: Patient's fasting glucose was 124 on 1/2/18.  Plan: Hemoglobin A1c              End of Life Planning:  Patient currently has an advanced directive: No.  I have verified the patient's ablity to prepare an advanced directive/make health care decisions.  Literature was provided to assist patient in preparing an advanced directive.    COUNSELING:  Reviewed preventive health counseling, as reflected in patient instructions      Estimated body mass index is 28.32 kg/(m^2) as calculated from the following:    Height as of this encounter: 5' 4\" (1.626 m).    Weight as of this encounter: 165 lb (74.8 kg).       reports that she has been smoking Cigarettes.  She has been smoking about 0.50 packs per day. She has never used smokeless tobacco.  Tobacco Cessation Action Plan: Self help information given to patient    Appropriate preventive services were discussed with this patient, including applicable screening as appropriate for cardiovascular disease, diabetes, osteopenia/osteoporosis, and glaucoma.  As appropriate for age/gender, discussed screening for colorectal cancer, prostate cancer, breast cancer, and cervical cancer. Checklist reviewing preventive services available has been given to the patient.    Reviewed patients plan of care and provided an AVS. The Basic Care Plan (routine screening as documented in Health Maintenance) for Kelsea meets the Care Plan requirement. This Care Plan has been established and reviewed with the Patient.    Counseling Resources:  ATP IV Guidelines  Pooled Cohorts Equation Calculator  Breast Cancer Risk Calculator  FRAX Risk Assessment  ICSI Preventive Guidelines  Dietary Guidelines for Americans, 2010  USDA's MyPlate  ASA Prophylaxis  Lung CA " Screening    Maritza Lopez MD  [facility]

## 2018-01-23 NOTE — LETTER
January 24, 2018    Kelsea Valentine  [address] [address] [id number]            Dear Kelsea,    A1C is slightly higher but not yet in diabetic range.  Below is a copy of the results.    If you have any questions or concerns, please call myself or my nurse at [phone].    Sincerely,      Maritza Lopez MD/ sarah    Results for orders placed or performed in visit on 01/23/18   Hemoglobin A1c   Result Value Ref Range    Hemoglobin A1C 6.3 (H) 4.3 - 6.0 %

## 2018-03-06 ENCOUNTER — DOCUMENTATION ONLY (OUTPATIENT)
Dept: LAB | Facility: CLINIC | Age: 73
End: 2018-03-06

## 2018-03-06 DIAGNOSIS — N18.30 CKD (CHRONIC KIDNEY DISEASE) STAGE 3, GFR 30-59 ML/MIN (H): Primary | ICD-10-CM

## 2018-03-06 DIAGNOSIS — R73.01 ELEVATED FASTING GLUCOSE: ICD-10-CM

## 2018-03-06 NOTE — PROGRESS NOTES
.Please place or confirm pending lab orders for upcoming lab appointment on 03/13/18.  ThanksJosefina

## 2018-03-07 NOTE — PROGRESS NOTES
Please review lab orders sign and close encounter. Zeinab Bowers MA/ELEONORA    Chol/BP appt 3/21/18    Patient had labs 1/23/18

## 2018-06-06 ENCOUNTER — TELEPHONE (OUTPATIENT)
Dept: FAMILY MEDICINE | Facility: CLINIC | Age: 73
End: 2018-06-06

## 2018-06-06 DIAGNOSIS — J43.9 PULMONARY EMPHYSEMA, UNSPECIFIED EMPHYSEMA TYPE (H): ICD-10-CM

## 2018-06-06 RX ORDER — ALBUTEROL SULFATE 90 UG/1
2 AEROSOL, METERED RESPIRATORY (INHALATION) EVERY 6 HOURS
Qty: 1 INHALER | Refills: 0 | Status: SHIPPED | OUTPATIENT
Start: 2018-06-06 | End: 2018-07-31

## 2018-06-06 NOTE — TELEPHONE ENCOUNTER
Per patient, unable to afford the Incruse, therefore using Albuterol 4-5 times per day, maybe more.  Patient states she is not in the donut hole.   Patient states has met deductible.  A new prescription is sent to the pharmacy to see the cost and if it affordable.  If not will try to determine cheaper alternatives.  If not discussed [place] pharmacy assistance program or .   Patient will call back after discusses with pharmacy.  direct line # [phone].   The patient/parent agrees with the plan and verbalized good understanding.  Kelsey Garcia RN

## 2018-06-06 NOTE — TELEPHONE ENCOUNTER
Patient requesting a refill for albuterol (PROAIR HFA/PROVENTIL patient is out of this now   Thank you

## 2018-06-06 NOTE — TELEPHONE ENCOUNTER
Patient called back, Incruse is affordable at $27 per mos.    Patient will start this medication.    A refill is sent for albuterol inhaler.  Patient will call back to schedule appointment for July.  The patient/parent agrees with the plan and verbalized good understanding.  Kelsey Garcia RN

## 2018-06-07 RX ORDER — ALBUTEROL SULFATE 90 UG/1
AEROSOL, METERED RESPIRATORY (INHALATION)
OUTPATIENT
Start: 2018-06-07

## 2018-06-08 RX ORDER — ALBUTEROL SULFATE 90 UG/1
AEROSOL, METERED RESPIRATORY (INHALATION)
Qty: 54 G | Refills: 1 | OUTPATIENT
Start: 2018-06-08

## 2018-07-12 ENCOUNTER — DOCUMENTATION ONLY (OUTPATIENT)
Dept: LAB | Facility: CLINIC | Age: 73
End: 2018-07-12

## 2018-07-12 DIAGNOSIS — N18.30 CKD (CHRONIC KIDNEY DISEASE) STAGE 3, GFR 30-59 ML/MIN (H): ICD-10-CM

## 2018-07-12 DIAGNOSIS — E78.5 HYPERLIPIDEMIA LDL GOAL <100: ICD-10-CM

## 2018-07-12 DIAGNOSIS — I10 HYPERTENSION GOAL BP (BLOOD PRESSURE) < 140/90: Primary | ICD-10-CM

## 2018-07-12 NOTE — PROGRESS NOTES
Patient has an upcoming previsit appointment on 07/25/2018. Please review pended orders and add additional orders if needed.     Thank you,   Yazmin Johnson

## 2018-07-13 NOTE — PROGRESS NOTES
Please review lab orders sign and close encounter. Zeinab Bowers MA/TC    Cholesterol/med check appt 7/31/18

## 2018-07-23 NOTE — PROGRESS NOTES
SUBJECTIVE:   Kelsea Valentine is a 73 year old female who presents to clinic today for the following health issues:      Hyperlipidemia Follow-Up      Rate your low fat/cholesterol diet?: good    Taking statin?  Yes, possible muscle aches from statin    Other lipid medications/supplements?:  none    Hypertension Follow-up      Outpatient blood pressures Checked only periodically, not recently     Low Salt Diet: low salt  Hypertension ROS: taking medications as instructed, no medication side effects noted, no TIA's, no chest pain on exertion, no dyspnea on exertion, noting swelling of ankles.  No headache or visual changes.      Amount of exercise or physical activity: Working, active at work     Problems taking medications regularly: No    Medication side effects: muscle aches    Diet: low salt, low fat/cholesterol and diabetic diet with              Pt very happy to be working part time again. Doing well.  Using Incruse regularly, improved breathing.        Problem list and histories reviewed & adjusted, as indicated.  Additional history: as documented    Patient Active Problem List   Diagnosis     Hyperlipidemia LDL goal <100     Hypertensive hypertrophic cardiomyopathy (H)     Mitral valve insufficiency     Hypertension goal BP (blood pressure) < 140/90     CKD (chronic kidney disease) stage 3, GFR 30-59 ml/min     GERD (gastroesophageal reflux disease)     COPD (chronic obstructive pulmonary disease) (H)     Abnormal glucose     Advanced directives, counseling/discussion     Pseudophakia, ou     Benign neoplasm of colon, unspecified part of colon     Past Surgical History:   Procedure Laterality Date     CATARACT IOL, RT/LT       CHOLECYSTECTOMY, OPEN       COLONOSCOPY N/A 7/27/2015    Procedure: COLONOSCOPY;  Surgeon: Dilip Quintero MD;  Location: [facility] OR     COLONOSCOPY WITH CO2 INSUFFLATION N/A 7/27/2015    Procedure: COLONOSCOPY WITH CO2 INSUFFLATION;  Surgeon: Dilip Quintero MD;   Location: [facility] OR     HC TOOTH EXTRACTION W/FORCEP      false teeth     NON-SURGICAL SEPTAL REDUCTION THERAPY W/ CORONARY ARTERIOGRAMS, W/WO TEMPORARY PACEMAKER       PHACOEMULSIFICATION WITH STANDARD INTRAOCULAR LENS IMPLANT  03/2017; 3/2017    left eye; right eye     SURGICAL HISTORY OF -   04/2010    mitral valvuloplasty [place]       Social History   Substance Use Topics     Smoking status: Current Every Day Smoker     Packs/day: 0.50     Types: Cigarettes     Last attempt to quit: 3/15/2010     Smokeless tobacco: Never Used      Comment:  smokes     Alcohol use No     Family History   Problem Relation Age of Onset     Cancer Mother      stomach,bone     Alcohol/Drug Mother      smoker     C.A.D. Father      Cancer Father      Alcohol/Drug Father      smoker     Hypertension Father      HEART DISEASE Sister      Lipids Daughter      HEART DISEASE Sister      Breast Cancer Sister 64     Other Cancer Sister      bone , kidney     Cerebrovascular Disease Brother      Cardiovascular Brother      Diabetes Brother      Hypertension Brother      Hypertension Brother      Cancer - colorectal Daughter 41     Thyroid Disease No family hx of      Glaucoma No family hx of      Macular Degeneration No family hx of          Current Outpatient Prescriptions   Medication Sig Dispense Refill     albuterol (PROAIR HFA/PROVENTIL HFA/VENTOLIN HFA) 108 (90 Base) MCG/ACT Inhaler Inhale 2 puffs into the lungs every 6 hours 3 Inhaler 1     amLODIPine (NORVASC) 5 MG tablet TAKE 1 TABLET EVERY DAY 90 tablet 1     atorvastatin (LIPITOR) 20 MG tablet Take 1 tablet (20 mg) by mouth daily 90 tablet 3     irbesartan (AVAPRO) 300 MG tablet TAKE 1 TABLET EVERY DAY 90 tablet 1     metoprolol succinate (TOPROL-XL) 100 MG 24 hr tablet TAKE 1 TABLET EVERY DAY 90 tablet 1     umeclidinium (INCRUSE ELLIPTA) 62.5 MCG/INH oral inhaler Inhale 1 puff into the lungs daily 3 Inhaler 1     benzonatate (TESSALON) 200 MG capsule Take 1 capsule (200 mg) by  mouth 3 times daily as needed for cough 90 capsule 3     CALCIUM 600/VITAMIN D OR 1 everyday       Cholecalciferol (VITAMIN D) 1000 UNIT capsule Take 1 capsule by mouth daily.       CLARITIN 10 MG OR TABS 1 TABLET DAILY       estradiol (ESTRACE VAGINAL) 0.1 MG/GM cream Use 2 grams vaginally every night for 2 weeks then use twice weekly for maintenancy 42.5 g 11     FISH OIL OR one everyday       GLUCOSAMINE 1500 COMPLEX OR one everyday       omeprazole 20 MG tablet Take 1 tablet by mouth daily. Take 30-60 minutes before a meal. 30 tablet 1     ONE-A-DAY WEIGHT SMART ADVANCE OR 1 every other day       TYLENOL CAPS 500 MG OR 1 CAPSULE EVERY 4 HOURS AS NEEDED       VITAMIN C OR 1 everyday       [DISCONTINUED] albuterol (PROAIR HFA/PROVENTIL HFA/VENTOLIN HFA) 108 (90 Base) MCG/ACT Inhaler Inhale 2 puffs into the lungs every 6 hours 1 Inhaler 0     [DISCONTINUED] albuterol (PROAIR HFA/PROVENTIL HFA/VENTOLIN HFA) 108 (90 BASE) MCG/ACT Inhaler Inhale 2 puffs into the lungs every 6 hours as needed for shortness of breath / dyspnea or wheezing 1 Inhaler 0     [DISCONTINUED] amLODIPine (NORVASC) 5 MG tablet TAKE 1 TABLET EVERY DAY 90 tablet 1     [DISCONTINUED] atorvastatin (LIPITOR) 20 MG tablet Take 1 tablet (20 mg) by mouth daily (Patient not taking: Reported on 7/31/2018) 90 tablet 3     [DISCONTINUED] irbesartan (AVAPRO) 300 MG tablet TAKE 1 TABLET EVERY DAY 90 tablet 1     [DISCONTINUED] metoprolol succinate (TOPROL-XL) 100 MG 24 hr tablet TAKE 1 TABLET EVERY DAY 90 tablet 1     BP Readings from Last 3 Encounters:   07/31/18 136/72   01/23/18 137/69   09/26/17 140/79    Wt Readings from Last 3 Encounters:   07/31/18 160 lb 12.8 oz (72.9 kg)   01/23/18 165 lb (74.8 kg)   09/26/17 169 lb (76.7 kg)                  Labs reviewed in EPIC    Reviewed and updated as needed this visit by clinical staff  Tobacco  Allergies  Meds  Problems  Med Hx  Surg Hx  Fam Hx  Soc Hx        Reviewed and updated as needed this visit  by Provider  Allergies  Meds  Problems         ROS:  Constitutional, HEENT, cardiovascular, pulmonary, gi and gu systems are negative, except as otherwise noted.    OBJECTIVE:     /72  Pulse 58  Temp 97.6  F (36.4  C) (Oral)  Resp 18  Wt 160 lb 12.8 oz (72.9 kg)  SpO2 96%  BMI 27.6 kg/m2  Body mass index is 27.6 kg/(m^2).  GENERAL: healthy, alert and no distress  EYES: Eyes grossly normal to inspection, PERRL and conjunctivae and sclerae normal  NECK: no adenopathy, no asymmetry, masses, or scars and thyroid normal to palpation  RESP: lungs clear to auscultation - no rales, rhonchi or wheezes and prolonged expiratory phase  CV: regular rate and rhythm, normal S1 S2, no S3 or S4, no murmur, click or rub, no peripheral edema and peripheral pulses strong  MS: no gross musculoskeletal defects noted, no edema  SKIN: no suspicious lesions or rashes  PSYCH: mentation appears normal, affect normal/bright    Diagnostic Test Results:  none     ASSESSMENT/PLAN:     (J43.9) Pulmonary emphysema, unspecified emphysema type (H)  (primary encounter diagnosis)  Comment: improved with regular Incruse use  Plan: umeclidinium (INCRUSE ELLIPTA) 62.5 MCG/INH         oral inhaler, albuterol (PROAIR HFA/PROVENTIL         HFA/VENTOLIN HFA) 108 (90 Base) MCG/ACT Inhaler         Refill x 6 months, f/u 6 months for OV and labs wellness    (I11.9,  I42.2) Hypertensive hypertrophic cardiomyopathy, without heart failure (H)  Comment: stable, doing well  Plan: metoprolol succinate (TOPROL-XL) 100 MG 24 hr         tablet, irbesartan (AVAPRO) 300 MG tablet,         atorvastatin (LIPITOR) 20 MG tablet, amLODIPine        (NORVASC) 5 MG tablet         Refill x 6 months     (I10) Hypertension goal BP (blood pressure) < 150/90  Comment: to goal  Plan: metoprolol succinate (TOPROL-XL) 100 MG 24 hr         tablet, amLODIPine (NORVASC) 5 MG tablet         Refill x 6 months         See Patient Instructions    Maritza Lopez MD  [facility]

## 2018-07-25 DIAGNOSIS — N18.30 CKD (CHRONIC KIDNEY DISEASE) STAGE 3, GFR 30-59 ML/MIN (H): ICD-10-CM

## 2018-07-25 DIAGNOSIS — I10 HYPERTENSION GOAL BP (BLOOD PRESSURE) < 140/90: ICD-10-CM

## 2018-07-25 DIAGNOSIS — R73.01 ELEVATED FASTING GLUCOSE: ICD-10-CM

## 2018-07-25 LAB
ANION GAP SERPL CALCULATED.3IONS-SCNC: 6 MMOL/L (ref 3–14)
BUN SERPL-MCNC: 18 MG/DL (ref 7–30)
CALCIUM SERPL-MCNC: 9 MG/DL (ref 8.5–10.1)
CHLORIDE SERPL-SCNC: 105 MMOL/L (ref 94–109)
CO2 SERPL-SCNC: 29 MMOL/L (ref 20–32)
CREAT SERPL-MCNC: 1.06 MG/DL (ref 0.52–1.04)
CREAT UR-MCNC: 70 MG/DL
GFR SERPL CREATININE-BSD FRML MDRD: 51 ML/MIN/1.7M2
GLUCOSE SERPL-MCNC: 107 MG/DL (ref 70–99)
HBA1C MFR BLD: 6.2 % (ref 0–5.6)
HGB BLD-MCNC: 14.2 G/DL (ref 11.7–15.7)
MICROALBUMIN UR-MCNC: 127 MG/L
MICROALBUMIN/CREAT UR: 181.95 MG/G CR (ref 0–25)
POTASSIUM SERPL-SCNC: 4.2 MMOL/L (ref 3.4–5.3)
SODIUM SERPL-SCNC: 140 MMOL/L (ref 133–144)

## 2018-07-25 PROCEDURE — 83036 HEMOGLOBIN GLYCOSYLATED A1C: CPT | Performed by: FAMILY MEDICINE

## 2018-07-25 PROCEDURE — 80048 BASIC METABOLIC PNL TOTAL CA: CPT | Performed by: FAMILY MEDICINE

## 2018-07-25 PROCEDURE — 36415 COLL VENOUS BLD VENIPUNCTURE: CPT | Performed by: FAMILY MEDICINE

## 2018-07-25 PROCEDURE — 85018 HEMOGLOBIN: CPT | Performed by: FAMILY MEDICINE

## 2018-07-25 PROCEDURE — 82043 UR ALBUMIN QUANTITATIVE: CPT | Performed by: FAMILY MEDICINE

## 2018-07-31 ENCOUNTER — OFFICE VISIT (OUTPATIENT)
Dept: FAMILY MEDICINE | Facility: CLINIC | Age: 73
End: 2018-07-31
Payer: COMMERCIAL

## 2018-07-31 VITALS
WEIGHT: 160.8 LBS | RESPIRATION RATE: 18 BRPM | HEART RATE: 58 BPM | SYSTOLIC BLOOD PRESSURE: 136 MMHG | BODY MASS INDEX: 27.6 KG/M2 | DIASTOLIC BLOOD PRESSURE: 72 MMHG | OXYGEN SATURATION: 96 % | TEMPERATURE: 97.6 F

## 2018-07-31 DIAGNOSIS — I42.2 HYPERTENSIVE HYPERTROPHIC CARDIOMYOPATHY, WITHOUT HEART FAILURE (H): ICD-10-CM

## 2018-07-31 DIAGNOSIS — I10 HYPERTENSION GOAL BP (BLOOD PRESSURE) < 150/90: ICD-10-CM

## 2018-07-31 DIAGNOSIS — J43.9 PULMONARY EMPHYSEMA, UNSPECIFIED EMPHYSEMA TYPE (H): Primary | ICD-10-CM

## 2018-07-31 DIAGNOSIS — I11.9 HYPERTENSIVE HYPERTROPHIC CARDIOMYOPATHY, WITHOUT HEART FAILURE (H): ICD-10-CM

## 2018-07-31 PROCEDURE — 99214 OFFICE O/P EST MOD 30 MIN: CPT | Performed by: FAMILY MEDICINE

## 2018-07-31 RX ORDER — IRBESARTAN 300 MG/1
TABLET ORAL
Qty: 90 TABLET | Refills: 1 | Status: SHIPPED | OUTPATIENT
Start: 2018-07-31 | End: 2019-01-04

## 2018-07-31 RX ORDER — ATORVASTATIN CALCIUM 20 MG/1
20 TABLET, FILM COATED ORAL DAILY
Qty: 90 TABLET | Refills: 3 | Status: SHIPPED | OUTPATIENT
Start: 2018-07-31 | End: 2019-01-04

## 2018-07-31 RX ORDER — METOPROLOL SUCCINATE 100 MG/1
TABLET, EXTENDED RELEASE ORAL
Qty: 90 TABLET | Refills: 1 | Status: SHIPPED | OUTPATIENT
Start: 2018-07-31 | End: 2019-01-04

## 2018-07-31 RX ORDER — AMLODIPINE BESYLATE 5 MG/1
TABLET ORAL
Qty: 90 TABLET | Refills: 1 | Status: SHIPPED | OUTPATIENT
Start: 2018-07-31 | End: 2019-01-04

## 2018-07-31 RX ORDER — ALBUTEROL SULFATE 90 UG/1
2 AEROSOL, METERED RESPIRATORY (INHALATION) EVERY 6 HOURS
Qty: 3 INHALER | Refills: 1 | Status: SHIPPED | OUTPATIENT
Start: 2018-07-31 | End: 2018-08-01

## 2018-07-31 NOTE — NURSING NOTE
"Chief Complaint   Patient presents with     Hypertension     Lipids       Initial /72  Pulse 58  Temp 97.6  F (36.4  C) (Oral)  Resp 18  Wt 160 lb 12.8 oz (72.9 kg)  SpO2 96%  BMI 27.6 kg/m2 Estimated body mass index is 27.6 kg/(m^2) as calculated from the following:    Height as of 1/23/18: 5' 4\" (1.626 m).    Weight as of this encounter: 160 lb 12.8 oz (72.9 kg).  Medication Reconciliation: complete  Low Nair CMA    "

## 2018-07-31 NOTE — MR AVS SNAPSHOT
After Visit Summary   7/31/2018    Kelsea Valentine    MRN: [id number]           Patient Information     Date Of Birth          1945        Visit Information        Provider Department      7/31/2018 3:00 PM Maritza Lopez MD [facility]        Today's Diagnoses     CKD (chronic kidney disease) stage 3, GFR 30-59 ml/min    -  1    Pulmonary emphysema, unspecified emphysema type (H)        Hypertension goal BP (blood pressure) < 150/90        Hypertensive hypertrophic cardiomyopathy, without heart failure (H)          Care Instructions        Continue all medications.                  Follow-ups after your visit        Follow-up notes from your care team     Return in about 6 months (around 1/31/2019) for Fasting Lab Work, Wellness Exam.      Your next 10 appointments already scheduled     Aug 21, 2018  3:00 PM CDT   New Visit with Davi Mosquera MD   [facility] ([address] [address] [phone]              Who to contact     If you have questions or need follow up information about today's clinic visit or your schedule please contact [facility] directly at [phone].  Normal or non-critical lab and imaging results will be communicated to you by MyChart, letter or phone within 4 business days after the clinic has received the results. If you do not hear from us within 7 days, please contact the clinic through MyChart or phone. If you have a critical or abnormal lab result, we will notify you by phone as soon as possible.  Submit refill requests through [employer] or call your pharmacy and they will forward the refill request to us. Please allow 3 business days for your refill to be completed.          Additional Information About Your Visit        Care EveryWhere ID     This is your Care EveryWhere ID. This could be used by other organizations to access your [facility]  records  [id number]        Your Vitals Were     Pulse Temperature Respirations Pulse Oximetry BMI (Body Mass Index)       58 97.6  F (36.4  C) (Oral) 18 96% 27.6 kg/m2        Blood Pressure from Last 3 Encounters:   07/31/18 136/72   01/23/18 137/69   09/26/17 140/79    Weight from Last 3 Encounters:   07/31/18 160 lb 12.8 oz (72.9 kg)   01/23/18 165 lb (74.8 kg)   09/26/17 169 lb (76.7 kg)              Today, you had the following     No orders found for display         Where to get your medicines      These medications were sent to  Pharmacy - [place], [place] - [address] AT [address] [facility]  [address], [facility] [address]     Phone:  [phone]     albuterol 108 (90 Base) MCG/ACT Inhaler    amLODIPine 5 MG tablet    atorvastatin 20 MG tablet    irbesartan 300 MG tablet    metoprolol succinate 100 MG 24 hr tablet    umeclidinium 62.5 MCG/INH oral inhaler          Primary Care Provider Office Phone # Fax #    Maritza Lopez [phone] [address] [address] [facility] [address]        Equal Access to Services     JIGNA MAGALLON : Hadii latrice burrell hadasho Soomaali, waaxda luqadaha, qaybta kaalmada adeegyada, waxay johannin hayhinan sandra mosley. So [facility] [phone].    ATENCIÓN: Si habla español, tiene a copeland disposición servicios gratuitos de asistencia lingüística. [facility] [phone].    We comply with applicable federal civil rights laws and [place] laws. We do not discriminate on the basis of race, color, national origin, age, disability, sex, sexual orientation, or gender identity.            Thank you!     Thank you for choosing [facility]  for your care. Our goal is always to provide you with excellent care. Hearing back from our patients is one way we can continue to improve our services. Please take a few minutes to complete the written survey that you may receive in the mail after your visit with us. Thank  you!             Your Updated Medication List - Protect others around you: Learn how to safely use, store and throw away your medicines at www.disposemymeds.org.          This list is accurate as of 7/31/18  3:31 PM.  Always use your most recent med list.                   Brand Name Dispense Instructions for use Diagnosis    albuterol 108 (90 Base) MCG/ACT Inhaler    PROAIR HFA/PROVENTIL HFA/VENTOLIN HFA    3 Inhaler    Inhale 2 puffs into the lungs every 6 hours    Pulmonary emphysema, unspecified emphysema type (H)       amLODIPine 5 MG tablet    NORVASC    90 tablet    TAKE 1 TABLET EVERY DAY    Hypertension goal BP (blood pressure) < 150/90, Hypertensive hypertrophic cardiomyopathy, without heart failure (H)       atorvastatin 20 MG tablet    LIPITOR    90 tablet    Take 1 tablet (20 mg) by mouth daily    Hypertensive hypertrophic cardiomyopathy, without heart failure (H)       benzonatate 200 MG capsule    TESSALON    90 capsule    Take 1 capsule (200 mg) by mouth 3 times daily as needed for cough    Pulmonary emphysema, unspecified emphysema type (H)       CALCIUM 600/VITAMIN D PO      1 everyday        CLARITIN 10 MG tablet   Generic drug:  loratadine      1 TABLET DAILY        estradiol 0.1 MG/GM cream    ESTRACE VAGINAL    42.5 g    Use 2 grams vaginally every night for 2 weeks then use twice weekly for maintenancy    Atrophic vaginitis       FISH OIL PO      one everyday        GLUCOSAMINE 1500 COMPLEX PO      one everyday        irbesartan 300 MG tablet    AVAPRO    90 tablet    TAKE 1 TABLET EVERY DAY    Hypertensive hypertrophic cardiomyopathy, without heart failure (H)       metoprolol succinate 100 MG 24 hr tablet    TOPROL-XL    90 tablet    TAKE 1 TABLET EVERY DAY    Hypertension goal BP (blood pressure) < 150/90, Hypertensive hypertrophic cardiomyopathy, without heart failure (H)       omeprazole 20 MG tablet     30 tablet    Take 1 tablet by mouth daily. Take 30-60 minutes before a meal.     Cough, GERD (gastroesophageal reflux disease)       ONE-A-DAY WEIGHT SMART ADVANCE PO      1 every other day        TYLENOL CAPS 500 MG OR      1 CAPSULE EVERY 4 HOURS AS NEEDED        umeclidinium 62.5 MCG/INH oral inhaler    INCRUSE ELLIPTA    3 Inhaler    Inhale 1 puff into the lungs daily    Pulmonary emphysema, unspecified emphysema type (H)       VITAMIN C PO      1 everyday        vitamin D 1000 units capsule      Take 1 capsule by mouth daily.

## 2018-08-01 ENCOUNTER — TELEPHONE (OUTPATIENT)
Dept: FAMILY MEDICINE | Facility: CLINIC | Age: 73
End: 2018-08-01

## 2018-08-01 DIAGNOSIS — J43.9 PULMONARY EMPHYSEMA, UNSPECIFIED EMPHYSEMA TYPE (H): ICD-10-CM

## 2018-08-01 RX ORDER — ALBUTEROL SULFATE 90 UG/1
2 AEROSOL, METERED RESPIRATORY (INHALATION) EVERY 6 HOURS
Qty: 3 INHALER | Refills: 1 | Status: SHIPPED | OUTPATIENT
Start: 2018-08-01 | End: 2019-01-23

## 2018-08-01 NOTE — TELEPHONE ENCOUNTER
Message from pharmacy states patients benefit plan does not cover proair HFA inhailer 8.5 GM.  Please consider Ventolin HFA as an alternative medication.   Pharmacy requesting new RX for patient with strength, directions, quantity and refills.       [facility]  [phone]

## 2018-08-01 NOTE — TELEPHONE ENCOUNTER
New prescription for Ventolin HFA is sent to pharmacy per formulary request, per Dr Maritza Lopez.    Per protocol, will route encounter to be cosigned by provider for Verbal Orders.  Kelsey Garcia RN

## 2018-08-21 ENCOUNTER — OFFICE VISIT (OUTPATIENT)
Dept: OPHTHALMOLOGY | Facility: CLINIC | Age: 73
End: 2018-08-21
Payer: COMMERCIAL

## 2018-08-21 DIAGNOSIS — H52.4 PRESBYOPIA: ICD-10-CM

## 2018-08-21 DIAGNOSIS — H43.813 POSTERIOR VITREOUS DETACHMENT OF BOTH EYES: ICD-10-CM

## 2018-08-21 DIAGNOSIS — Z96.1 PSEUDOPHAKIA: Primary | ICD-10-CM

## 2018-08-21 PROCEDURE — 92015 DETERMINE REFRACTIVE STATE: CPT | Performed by: OPHTHALMOLOGY

## 2018-08-21 PROCEDURE — 92014 COMPRE OPH EXAM EST PT 1/>: CPT | Performed by: OPHTHALMOLOGY

## 2018-08-21 ASSESSMENT — REFRACTION_MANIFEST
OS_AXIS: 005
OD_AXIS: 005
OS_SPHERE: -1.00
OD_CYLINDER: +1.50
OS_CYLINDER: +1.25
OD_SPHERE: -2.25
OS_ADD: +3.00
OD_ADD: +3.00

## 2018-08-21 ASSESSMENT — EXTERNAL EXAM - RIGHT EYE: OD_EXAM: PROLAPSED FAT PADS: UPPER, LOWER

## 2018-08-21 ASSESSMENT — REFRACTION_WEARINGRX
OS_CYLINDER: +1.50
OD_AXIS: 026
OD_CYLINDER: +1.50
OS_ADD: +3.00
OS_SPHERE: -1.00
OD_SPHERE: -2.00
OS_AXIS: 006
SPECS_TYPE: PAL
OD_ADD: +3.00

## 2018-08-21 ASSESSMENT — TONOMETRY
OD_IOP_MMHG: 16
OS_IOP_MMHG: 16
IOP_METHOD: TONOPEN

## 2018-08-21 ASSESSMENT — CONF VISUAL FIELD
OD_NORMAL: 1
OS_NORMAL: 1

## 2018-08-21 ASSESSMENT — VISUAL ACUITY
CORRECTION_TYPE: GLASSES
OD_CC: J1
METHOD: SNELLEN - LINEAR
OS_CC: J1
OS_CC: 20/25+2
OD_CC: 20/30+3

## 2018-08-21 ASSESSMENT — CUP TO DISC RATIO
OS_RATIO: 0.2
OD_RATIO: 0.5

## 2018-08-21 ASSESSMENT — EXTERNAL EXAM - LEFT EYE: OS_EXAM: PROLAPSED FAT PADS: UPPER, LOWER

## 2018-08-21 NOTE — PROGRESS NOTES
Current Eye Medications:  no     Subjective:  Comprehensive eye exam.   Patient reports is seeing well, vision seems unchanged and states eyes seem otherwise fine.  Pt has glasses that she chooses not to wear , states sees fine without wearing any glasses.     Objective:  See Ophthalmology Exam.       Assessment:  Stable eye exam.      ICD-10-CM    1. Pseudophakia, ou Z96.1 EYE EXAM (SIMPLE-NONBILLABLE)   2. 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 Presbyopia H52.4 REFRACTIVE STATUS        Plan:  Encouraged to discuss smoking cessation with PCP.  Glasses Rx given - optional.  Possible clouding of posterior capsule both eyes discussed.  Call in April 2019 for an appointment in August 2019 for Complete Exam.    Dr. Mosquera [phone]

## 2018-08-21 NOTE — PATIENT INSTRUCTIONS
Glasses Rx given - optional.  Possible clouding of posterior capsule both eyes discussed.  Call in April 2019 for an appointment in August 2019 for Complete Exam.    Dr. Mosquera [phone]

## 2018-08-21 NOTE — MR AVS SNAPSHOT
After Visit Summary   8/21/2018    Kelsea Valentine    MRN: [id number]           Patient Information     Date Of Birth          1945        Visit Information        Provider Department      8/21/2018 3:00 PM Davi Mosquera MD [facility]        Today's Diagnoses     Presbyopia    -  1    Pseudophakia, ou          Care Instructions    Glasses Rx given - optional.  Possible clouding of posterior capsule both eyes discussed.  Call in April 2019 for an appointment in August 2019 for Complete Exam.    Dr. Mosquera [phone]          Follow-ups after your visit        Who to contact     If you have questions or need follow up information about today's clinic visit or your schedule please contact [facility] directly at [phone].  Normal or non-critical lab and imaging results will be communicated to you by MyChart, letter or phone within 4 business days after the clinic has received the results. If you do not hear from us within 7 days, please contact the clinic through MyChart or phone. If you have a critical or abnormal lab result, we will notify you by phone as soon as possible.  Submit refill requests through [employer] or call your pharmacy and they will forward the refill request to us. Please allow 3 business days for your refill to be completed.          Additional Information About Your Visit        Care EveryWhere ID     This is your Care EveryWhere ID. This could be used by other organizations to access your [facility] medical records  [id number]         Blood Pressure from Last 3 Encounters:   07/31/18 136/72   01/23/18 137/69   09/26/17 140/79    Weight from Last 3 Encounters:   07/31/18 72.9 kg (160 lb 12.8 oz)   01/23/18 74.8 kg (165 lb)   09/26/17 76.7 kg (169 lb)              Today, you had the following     No orders found for display       Primary Care Provider Office Phone # Fax #    Maritza Lopez [phone] [address]  [facility] [address]        Equal Access to Services     [facility] NAUN : Hadii aad ku hadmalcolmshahid Monica, waaxda luqadaha, qaybta kamadyronni rogers, gena freychiaraoli mosley. So [facility] [phone].    ATENCIÓN: Si habla español, tiene a copeland disposición servicios gratuitos de asistencia lingüística. [facility] [phone].    We comply with applicable federal civil rights laws and [place] laws. We do not discriminate on the basis of race, color, national origin, age, disability, sex, sexual orientation, or gender identity.            Thank you!     Thank you for choosing [facility]  for your care. Our goal is always to provide you with excellent care. Hearing back from our patients is one way we can continue to improve our services. Please take a few minutes to complete the written survey that you may receive in the mail after your visit with us. Thank you!             Your Updated Medication List - Protect others around you: Learn how to safely use, store and throw away your medicines at www.disposemymeds.org.          This list is accurate as of 8/21/18  3:55 PM.  Always use your most recent med list.                   Brand Name Dispense Instructions for use Diagnosis    albuterol 108 (90 Base) MCG/ACT inhaler    VENTOLIN HFA    3 Inhaler    Inhale 2 puffs into the lungs every 6 hours    Pulmonary emphysema, unspecified emphysema type (H)       amLODIPine 5 MG tablet    NORVASC    90 tablet    TAKE 1 TABLET EVERY DAY    Hypertension goal BP (blood pressure) < 150/90, Hypertensive hypertrophic cardiomyopathy, without heart failure (H)       atorvastatin 20 MG tablet    LIPITOR    90 tablet    Take 1 tablet (20 mg) by mouth daily    Hypertensive hypertrophic cardiomyopathy, without heart failure (H)       benzonatate 200 MG capsule    TESSALON    90 capsule    Take 1 capsule (200 mg) by mouth 3 times daily as needed for cough    Pulmonary emphysema, unspecified emphysema type (H)        CALCIUM 600/VITAMIN D PO      1 everyday        CLARITIN 10 MG tablet   Generic drug:  loratadine      1 TABLET DAILY        estradiol 0.1 MG/GM cream    ESTRACE VAGINAL    42.5 g    Use 2 grams vaginally every night for 2 weeks then use twice weekly for maintenancy    Atrophic vaginitis       FISH OIL PO      one everyday        GLUCOSAMINE 1500 COMPLEX PO      one everyday        irbesartan 300 MG tablet    AVAPRO    90 tablet    TAKE 1 TABLET EVERY DAY    Hypertensive hypertrophic cardiomyopathy, without heart failure (H)       metoprolol succinate 100 MG 24 hr tablet    TOPROL-XL    90 tablet    TAKE 1 TABLET EVERY DAY    Hypertension goal BP (blood pressure) < 150/90, Hypertensive hypertrophic cardiomyopathy, without heart failure (H)       omeprazole 20 MG tablet     30 tablet    Take 1 tablet by mouth daily. Take 30-60 minutes before a meal.    Cough, GERD (gastroesophageal reflux disease)       ONE-A-DAY WEIGHT SMART ADVANCE PO      1 every other day        TYLENOL CAPS 500 MG OR      1 CAPSULE EVERY 4 HOURS AS NEEDED        umeclidinium 62.5 MCG/INH inhaler    INCRUSE ELLIPTA    3 Inhaler    Inhale 1 puff into the lungs daily    Pulmonary emphysema, unspecified emphysema type (H)       VITAMIN C PO      1 everyday        vitamin D 1000 units capsule      Take 1 capsule by mouth daily.

## 2018-08-21 NOTE — LETTER
8/21/2018         RE: Kelsea Valentine  [address] [facility] [address]        Dear Colleague,    Thank you for referring your patient, Kelsea Valentine, to the [facility]. Please see a copy of my visit note below.     Current Eye Medications:  no     Subjective:  Comprehensive eye exam.   Patient reports is seeing well, vision seems unchanged and states eyes seem otherwise fine.  Pt has glasses that she chooses not to wear , states sees fine without wearing any glasses.     Objective:  See Ophthalmology Exam.       Assessment:  Stable eye exam.      ICD-10-CM    1. Pseudophakia, ou Z96.1 EYE EXAM (SIMPLE-NONBILLABLE)   2. 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 Presbyopia H52.4 REFRACTIVE STATUS        Plan:  Encouraged to discuss smoking cessation with PCP.  Glasses Rx given - optional.  Possible clouding of posterior capsule both eyes discussed.  Call in April 2019 for an appointment in August 2019 for Complete Exam.    Dr. Mosquera [phone]      Again, thank you for allowing me to participate in the care of your patient.        Sincerely,        Davi Mosquera MD

## 2018-08-22 PROBLEM — H43.813 POSTERIOR VITREOUS DETACHMENT OF BOTH EYES: Status: ACTIVE | Noted: 2018-08-22

## 2018-09-08 DIAGNOSIS — J43.9 PULMONARY EMPHYSEMA, UNSPECIFIED EMPHYSEMA TYPE (H): ICD-10-CM

## 2018-09-11 NOTE — TELEPHONE ENCOUNTER
Prescription approved per [facility] Refill Protocol.    Requested Prescriptions   Pending Prescriptions Disp Refills     INCRUSE ELLIPTA 62.5 MCG/INH Inhaler [Pharmacy Med Name: INCRUSE ELLIPTA 62.5 MCG INH]  2     Sig: INHALE 1 PUFF INTO THE LUNGS DAILY    Asthma Maintenance Inhalers - Anticholinergics Passed    9/8/2018  1:27 AM     Monica Carpenter RN

## 2019-01-02 ENCOUNTER — DOCUMENTATION ONLY (OUTPATIENT)
Dept: OPHTHALMOLOGY | Facility: CLINIC | Age: 74
End: 2019-01-02

## 2019-01-03 DIAGNOSIS — I42.2 HYPERTENSIVE HYPERTROPHIC CARDIOMYOPATHY, WITHOUT HEART FAILURE (H): ICD-10-CM

## 2019-01-03 DIAGNOSIS — I11.9 HYPERTENSIVE HYPERTROPHIC CARDIOMYOPATHY, WITHOUT HEART FAILURE (H): ICD-10-CM

## 2019-01-03 DIAGNOSIS — J43.9 PULMONARY EMPHYSEMA, UNSPECIFIED EMPHYSEMA TYPE (H): ICD-10-CM

## 2019-01-04 DIAGNOSIS — I42.2 HYPERTENSIVE HYPERTROPHIC CARDIOMYOPATHY, WITHOUT HEART FAILURE (H): ICD-10-CM

## 2019-01-04 DIAGNOSIS — I10 HYPERTENSION GOAL BP (BLOOD PRESSURE) < 150/90: ICD-10-CM

## 2019-01-04 DIAGNOSIS — I11.9 HYPERTENSIVE HYPERTROPHIC CARDIOMYOPATHY, WITHOUT HEART FAILURE (H): ICD-10-CM

## 2019-01-04 RX ORDER — AMLODIPINE BESYLATE 5 MG/1
TABLET ORAL
Qty: 90 TABLET | Refills: 0 | Status: SHIPPED | OUTPATIENT
Start: 2019-01-04 | End: 2019-01-23

## 2019-01-04 RX ORDER — ATORVASTATIN CALCIUM 20 MG/1
20 TABLET, FILM COATED ORAL DAILY
Qty: 90 TABLET | Refills: 1 | Status: SHIPPED | OUTPATIENT
Start: 2019-01-04 | End: 2019-08-13

## 2019-01-04 RX ORDER — METOPROLOL SUCCINATE 100 MG/1
TABLET, EXTENDED RELEASE ORAL
Qty: 90 TABLET | Refills: 0 | Status: SHIPPED | OUTPATIENT
Start: 2019-01-04 | End: 2019-01-23

## 2019-01-04 RX ORDER — IRBESARTAN 300 MG/1
TABLET ORAL
Qty: 90 TABLET | Refills: 0 | Status: SHIPPED | OUTPATIENT
Start: 2019-01-04 | End: 2019-01-23

## 2019-01-14 ENCOUNTER — DOCUMENTATION ONLY (OUTPATIENT)
Dept: LAB | Facility: CLINIC | Age: 74
End: 2019-01-14

## 2019-01-14 DIAGNOSIS — E78.5 HYPERLIPIDEMIA LDL GOAL <100: Primary | ICD-10-CM

## 2019-01-14 DIAGNOSIS — I10 HYPERTENSION GOAL BP (BLOOD PRESSURE) < 140/90: ICD-10-CM

## 2019-01-14 NOTE — PROGRESS NOTES
Please review and sign Pending Pre-visit Labs in [facility]. Labs 01/16/19 and Wellness exam 01/23/19  Katie CREWS

## 2019-01-15 DIAGNOSIS — J43.9 PULMONARY EMPHYSEMA, UNSPECIFIED EMPHYSEMA TYPE (H): ICD-10-CM

## 2019-01-15 NOTE — TELEPHONE ENCOUNTER
New rx req. Incruse Ellipta 62.5 mcg/Actuation Powder for Inhalation.   Please check pending order if correct.

## 2019-01-16 DIAGNOSIS — I10 HYPERTENSION GOAL BP (BLOOD PRESSURE) < 140/90: ICD-10-CM

## 2019-01-16 DIAGNOSIS — E78.5 HYPERLIPIDEMIA LDL GOAL <100: ICD-10-CM

## 2019-01-16 LAB
ANION GAP SERPL CALCULATED.3IONS-SCNC: 5 MMOL/L (ref 3–14)
BUN SERPL-MCNC: 21 MG/DL (ref 7–30)
CALCIUM SERPL-MCNC: 9.4 MG/DL (ref 8.5–10.1)
CHLORIDE SERPL-SCNC: 105 MMOL/L (ref 94–109)
CHOLEST SERPL-MCNC: 180 MG/DL
CO2 SERPL-SCNC: 28 MMOL/L (ref 20–32)
CREAT SERPL-MCNC: 1.05 MG/DL (ref 0.52–1.04)
GFR SERPL CREATININE-BSD FRML MDRD: 53 ML/MIN/{1.73_M2}
GLUCOSE SERPL-MCNC: 126 MG/DL (ref 70–99)
HDLC SERPL-MCNC: 43 MG/DL
LDLC SERPL CALC-MCNC: 89 MG/DL
NONHDLC SERPL-MCNC: 137 MG/DL
POTASSIUM SERPL-SCNC: 4.9 MMOL/L (ref 3.4–5.3)
SODIUM SERPL-SCNC: 138 MMOL/L (ref 133–144)
TRIGL SERPL-MCNC: 241 MG/DL

## 2019-01-16 PROCEDURE — 36415 COLL VENOUS BLD VENIPUNCTURE: CPT | Performed by: FAMILY MEDICINE

## 2019-01-16 PROCEDURE — 80048 BASIC METABOLIC PNL TOTAL CA: CPT | Performed by: FAMILY MEDICINE

## 2019-01-16 PROCEDURE — 80061 LIPID PANEL: CPT | Performed by: FAMILY MEDICINE

## 2019-01-18 NOTE — PATIENT INSTRUCTIONS

## 2019-01-18 NOTE — PROGRESS NOTES
"  SUBJECTIVE:   Kelsea Valentine is a 73 year old female who presents for Preventive Visit.  {PVP to remind patient that this is not necessarily a physical exam; physical exam may or may not be done:191319::\"click delete button to remove this line now\"}  {PVP to inform patient that additional E&M charge may apply, if additional problems addressed:585664::\"click delete button to remove this line now\"}  Are you in the first 12 months of your Medicare Part B coverage?  { :105951::\"No\"}    Physical Health:    In general, how would you rate your overall physical health? { :155649}    Outside of work, how many days during the week do you exercise? { :411182}    Outside of work, approximately how many minutes a day do you exercise?{ :005764}    If you drink alcohol do you typically have >3 drinks per day or >7 drinks per week? { :554965}    Do you usually eat at least 4 servings of fruit and vegetables a day, include whole grains & fiber and avoid regularly eating high fat or \"junk\" foods? { :896724::\"Yes\"}    Do you have any problems taking medications regularly?  { :846583::\"No\"}    Do you have any side effects from medications? { :812055}    Needs assistance for the following daily activities: { :511757}    Which of the following safety concerns are present in your home?  { :010500::\"none identified\"}     Hearing impairment: { :639781}    In the past 6 months, have you been bothered by leaking of urine? { :872123}    Mental Health:    In general, how would you rate your overall mental or emotional health? { :120181}  PHQ-2 Score:      Do you feel safe in your environment? { :159085}    Do you have a Health Care Directive? { :040883}    Additional concerns to address?  {If YES, notify patient they may be responsible for a copay, coinsurance or deductible if the visit today includes services such as checking on a sore throat, having an x-ray or lab test, or treating and evaluating a new or existing condition " ":479722::\"No\"}    Fall risk:  { :412377}  {If any of the above assessments are answered yes, consider ordering appropriate referrals (Optional):563020::\"click delete button to remove this line now\"}  Cognitive Screening: { :279644}    {Do you have sleep apnea, excessive snoring or daytime drowsiness? (Optional):263404}    {Outside tests to abstract? :584692}    {additional problems to add (Optional):913191}    Reviewed and updated as needed this visit by clinical staff         Reviewed and updated as needed this visit by Provider        Social History     Tobacco Use     Smoking status: Current Every Day Smoker     Packs/day: 0.50     Types: Cigarettes     Last attempt to quit: 3/15/2010     Years since quittin.8     Smokeless tobacco: Never Used     Tobacco comment:  smokes   Substance Use Topics     Alcohol use: No                           Current providers sharing in care for this patient include:   Patient Care Team:  Maritza Lopez MD as PCP - General (Family Practice)  Maritza Lopez MD as PCP - Assigned PCP    The following health maintenance items are reviewed in Epic and correct as of today:  Health Maintenance   Topic Date Due     ZOSTER IMMUNIZATION (2 of 2) 2018     MAMMO SCREEN Q2 YR (SYSTEM ASSIGNED)  2018     FALL RISK ASSESSMENT  2019     COPD ACTION PLAN Q1 YR  2019     BMP Q6 MOS  2019     HEMOGLOBIN Q1 YR  2019     MICROALBUMIN Q1 YEAR  2019     PHQ-2 Q1 YR  2019     EYE EXAM Q1 YEAR  2019     COLONOSCOPY Q2 YR  2019     LIPID MONITORING Q1 YEAR  2020     ADVANCE DIRECTIVE PLANNING Q5 YRS  2020     DTAP/TDAP/TD IMMUNIZATION (3 - Td) 2021     SPIROMETRY ONETIME  Completed     INFLUENZA VACCINE  Completed     HEPATITIS C SCREENING  Completed     DEXA SCAN SCREENING (SYSTEM ASSIGNED)  Addressed     IPV IMMUNIZATION  Aged Out     MENINGITIS IMMUNIZATION  Aged Out     {Chronicprobdata " "(Optional):247469}  {Decision Support (Optional):831861}    ROS:  {ROS COMP:786177}    OBJECTIVE:   There were no vitals taken for this visit. Estimated body mass index is 27.6 kg/m  as calculated from the following:    Height as of 18: 1.626 m (5' 4\").    Weight as of 18: 72.9 kg (160 lb 12.8 oz).  EXAM:   {Exam :555175}    {Diagnostic Test Results (Optional):299585::\"Diagnostic Test Results:\",\"none \"}    ASSESSMENT / PLAN:   {Diag Picklist:058873}    End of Life Planning:  Patient currently has an advanced directive: { :476024}    COUNSELING:  {Medicare Counselin}    BP Readings from Last 1 Encounters:   18 136/72     Estimated body mass index is 27.6 kg/m  as calculated from the following:    Height as of 18: 1.626 m (5' 4\").    Weight as of 18: 72.9 kg (160 lb 12.8 oz).    {BP Counseling- Complete if BP >= 120/80  (Optional):327229}  {Weight Management Plan (ACO) Complete if BMI is abnormal-  Ages 18-64  BMI >24.9.  Age 65+ with BMI <23 or >30 (Optional):491350}     reports that she has been smoking cigarettes.  She has been smoking about 0.50 packs per day. she has never used smokeless tobacco.  {Tobacco Cessation -- Complete if patient is a smoker (Optional):699453}    Appropriate preventive services were discussed with this patient, including applicable screening as appropriate for cardiovascular disease, diabetes, osteopenia/osteoporosis, and glaucoma.  As appropriate for age/gender, discussed screening for colorectal cancer, prostate cancer, breast cancer, and cervical cancer. Checklist reviewing preventive services available has been given to the patient.    Reviewed patients plan of care and provided an AVS. The {CarePlan:869123} for Kelsea meets the Care Plan requirement. This Care Plan has been established and reviewed with the {PATIENT, FAMILY MEMBER, CAREGIVER:164311}.    Counseling Resources:  ATP IV Guidelines  Pooled Cohorts Equation Calculator  Breast Cancer Risk " Calculator  FRAX Risk Assessment  ICSI Preventive Guidelines  Dietary Guidelines for Americans, 2010  USDA's MyPlate  ASA Prophylaxis  Lung CA Screening    Maritza Lopez MD  [facility]

## 2019-01-23 ENCOUNTER — OFFICE VISIT (OUTPATIENT)
Dept: FAMILY MEDICINE | Facility: CLINIC | Age: 74
End: 2019-01-23
Payer: COMMERCIAL

## 2019-01-23 VITALS
WEIGHT: 158 LBS | OXYGEN SATURATION: 97 % | HEART RATE: 56 BPM | DIASTOLIC BLOOD PRESSURE: 76 MMHG | BODY MASS INDEX: 27.12 KG/M2 | SYSTOLIC BLOOD PRESSURE: 136 MMHG

## 2019-01-23 DIAGNOSIS — Z00.00 MEDICARE ANNUAL WELLNESS VISIT, SUBSEQUENT: Primary | ICD-10-CM

## 2019-01-23 DIAGNOSIS — R73.01 ELEVATED FASTING GLUCOSE: ICD-10-CM

## 2019-01-23 DIAGNOSIS — I10 HYPERTENSION GOAL BP (BLOOD PRESSURE) < 150/90: ICD-10-CM

## 2019-01-23 DIAGNOSIS — J43.9 PULMONARY EMPHYSEMA, UNSPECIFIED EMPHYSEMA TYPE (H): ICD-10-CM

## 2019-01-23 DIAGNOSIS — I11.9 HYPERTENSIVE HYPERTROPHIC CARDIOMYOPATHY, WITHOUT HEART FAILURE (H): ICD-10-CM

## 2019-01-23 DIAGNOSIS — I42.2 HYPERTENSIVE HYPERTROPHIC CARDIOMYOPATHY, WITHOUT HEART FAILURE (H): ICD-10-CM

## 2019-01-23 LAB — HBA1C MFR BLD: 6.1 % (ref 0–5.6)

## 2019-01-23 PROCEDURE — 83036 HEMOGLOBIN GLYCOSYLATED A1C: CPT | Performed by: FAMILY MEDICINE

## 2019-01-23 PROCEDURE — 99397 PER PM REEVAL EST PAT 65+ YR: CPT | Performed by: FAMILY MEDICINE

## 2019-01-23 PROCEDURE — 99213 OFFICE O/P EST LOW 20 MIN: CPT | Mod: 25 | Performed by: FAMILY MEDICINE

## 2019-01-23 PROCEDURE — 36415 COLL VENOUS BLD VENIPUNCTURE: CPT | Performed by: FAMILY MEDICINE

## 2019-01-23 RX ORDER — IRBESARTAN 300 MG/1
TABLET ORAL
Qty: 90 TABLET | Refills: 0 | Status: SHIPPED | OUTPATIENT
Start: 2019-01-23 | End: 2019-08-13

## 2019-01-23 RX ORDER — ALBUTEROL SULFATE 90 UG/1
2 AEROSOL, METERED RESPIRATORY (INHALATION) EVERY 6 HOURS
Qty: 1 INHALER | Refills: 1 | Status: SHIPPED | OUTPATIENT
Start: 2019-01-23 | End: 2019-08-13

## 2019-01-23 RX ORDER — AMLODIPINE BESYLATE 5 MG/1
TABLET ORAL
Qty: 90 TABLET | Refills: 0 | Status: SHIPPED | OUTPATIENT
Start: 2019-01-23 | End: 2019-08-13

## 2019-01-23 RX ORDER — METOPROLOL SUCCINATE 100 MG/1
TABLET, EXTENDED RELEASE ORAL
Qty: 90 TABLET | Refills: 0 | Status: SHIPPED | OUTPATIENT
Start: 2019-01-23 | End: 2019-07-10

## 2019-01-23 ASSESSMENT — ACTIVITIES OF DAILY LIVING (ADL): CURRENT_FUNCTION: NO ASSISTANCE NEEDED

## 2019-01-23 NOTE — PROGRESS NOTES
"SUBJECTIVE:   Kelsea Valentine is a 73 year old female who presents for Preventive Visit.      Are you in the first 12 months of your Medicare coverage?  No    Annual Wellness Visit     In general, how would you rate your overall health?  Good    Frequency of exercise:  None    Do you usually eat at least 4 servings of fruit and vegetables a day, include whole grains    & fiber and avoid regularly eating high fat or \"junk\" foods?  No    Taking medications regularly:  Yes    Medication side effects:  None    Ability to successfully perform activities of daily living:  No assistance needed    Home Safety:  No safety concerns identified    Hearing Impairment:  No hearing concerns    In the past 6 months, have you been bothered by leaking of urine?  No    In general, how would you rate your overall mental or emotional health?  Good    PHQ-2 Total Score: 0    Additional concerns today:  No    Do you feel safe in your environment? Yes    Do you have a Health Care Directive? No: Advance care planning was reviewed with patient; patient declined at this time.    Fall risk  Fall Risk Assessment not completed.    Cognitive Screening   1) Repeat 3 items (Leader, Season, Table)    2) Clock draw: NORMAL  3) 3 item recall: Recalls 2 objects   Results: NORMAL clock, 1-2 items recalled: COGNITIVE IMPAIRMENT LESS LIKELY    Mini-CogTM Copyright S Ange. Licensed by the author for use in [facility]; reprinted with permission (soob@.[facility]). All rights reserved.      Do you have sleep apnea, excessive snoring or daytime drowsiness?: yes    Reviewed and updated as needed this visit by clinical staff  Tobacco  Allergies  Meds         Reviewed and updated as needed this visit by Provider        Social History     Tobacco Use     Smoking status: Current Every Day Smoker     Packs/day: 0.50     Types: Cigarettes     Last attempt to quit: 3/15/2010     Years since quittin.8     Smokeless tobacco: Never Used     Tobacco " comment:  smokes   Substance Use Topics     Alcohol use: No       Alcohol Use 2019   If you drink alcohol do you typically have greater than 3 drinks per day OR greater than 7 drinks per week? No           G 1 P 1   No LMP recorded. Patient is postmenopausal.     Fasting: Yes    Td: tdap        NO - age 67 - see link Cervical Cytology Screening Guidelines               Cholesterol:   Lab Results   Component Value Date    CHOL 180 2019     Lab Results   Component Value Date    HDL 43 2019     Lab Results   Component Value Date    LDL 89 2019     Lab Results   Component Value Date    TRIG 241 2019     Lab Results   Component Value Date    CHOLHDLRATIO 4.6 2015         MM2016  Dexa:  Yes , was many years ago, patient states it was normal    Flex/colo: Yes , 2015      Seat Belt: Yes    Sunscreen use: Yes   Calcium Intake: adequate Health Care Directive: Yes   Sexually Active: Yes     Current contraception: none  History of abnormal Pap smear: Yes: 47 years ago when had daughter, diagnosed with cancer  Family history of colon/breast/ovarian cancer: Yes: daughter had colon cancer, uncle on mother's side, sister and maternal aunt had breast cancer  Regular self breast exam: Yes  History of abnormal mammogram: No        Current providers sharing in care for this patient include:   Patient Care Team:  Maritza Lopez MD as PCP - General (Family Practice)  Maritza Lopez MD as PCP - Assigned PCP    The following health maintenance items are reviewed in Epic and correct as of today:  Health Maintenance   Topic Date Due     ZOSTER IMMUNIZATION (2 of 2) 2018     MAMMO SCREEN Q2 YR (SYSTEM ASSIGNED)  2018     FALL RISK ASSESSMENT  2019     COPD ACTION PLAN Q1 YR  2019     BMP Q6 MOS  2019     HEMOGLOBIN Q1 YR  2019     MICROALBUMIN Q1 YEAR  2019     PHQ-2 Q1 YR  2019     EYE EXAM Q1 YEAR  2019      COLONOSCOPY Q2 YR  09/26/2019     LIPID MONITORING Q1 YEAR  01/16/2020     ADVANCE DIRECTIVE PLANNING Q5 YRS  09/30/2020     DTAP/TDAP/TD IMMUNIZATION (3 - Td) 08/08/2021     SPIROMETRY ONETIME  Completed     INFLUENZA VACCINE  Completed     HEPATITIS C SCREENING  Completed     DEXA SCAN SCREENING (SYSTEM ASSIGNED)  Addressed     IPV IMMUNIZATION  Aged Out     MENINGITIS IMMUNIZATION  Aged Out     Labs reviewed in EPIC  BP Readings from Last 3 Encounters:   01/23/19 136/76   07/31/18 136/72   01/23/18 137/69    Wt Readings from Last 3 Encounters:   01/23/19 71.7 kg (158 lb)   07/31/18 72.9 kg (160 lb 12.8 oz)   01/23/18 74.8 kg (165 lb)                  Patient Active Problem List   Diagnosis     Hyperlipidemia LDL goal <100     Hypertensive hypertrophic cardiomyopathy (H)     Mitral valve insufficiency     Hypertension goal BP (blood pressure) < 140/90     CKD (chronic kidney disease) stage 3, GFR 30-59 ml/min (H)     GERD (gastroesophageal reflux disease)     COPD (chronic obstructive pulmonary disease) (H)     Abnormal glucose     Advanced directives, counseling/discussion     Pseudophakia, ou     Benign neoplasm of colon, unspecified part of colon     Posterior vitreous detachment of both eyes     Past Surgical History:   Procedure Laterality Date     CATARACT IOL, RT/LT       CHOLECYSTECTOMY, OPEN       COLONOSCOPY N/A 7/27/2015    Procedure: COLONOSCOPY;  Surgeon: Dilip Quintero MD;  Location: [facility]     COLONOSCOPY WITH CO2 INSUFFLATION N/A 7/27/2015    Procedure: COLONOSCOPY WITH CO2 INSUFFLATION;  Surgeon: Dilip Quintero MD;  Location: [facility] OR     HC TOOTH EXTRACTION W/FORCEP      false teeth     NON-SURGICAL SEPTAL REDUCTION THERAPY W/ CORONARY ARTERIOGRAMS, W/WO TEMPORARY PACEMAKER       PHACOEMULSIFICATION WITH STANDARD INTRAOCULAR LENS IMPLANT  03/2017; 3/2017    left eye; right eye     SURGICAL HISTORY OF -   04/2010    mitral valvuloplasty [place]       Social History     Tobacco Use      Smoking status: Current Every Day Smoker     Packs/day: 0.50     Types: Cigarettes     Last attempt to quit: 3/15/2010     Years since quittin.8     Smokeless tobacco: Never Used     Tobacco comment:  smokes   Substance Use Topics     Alcohol use: No     Family History   Problem Relation Age of Onset     Cancer Mother         stomach,bone     Alcohol/Drug Mother         smoker     C.A.D. Father      Cancer Father      Alcohol/Drug Father         smoker     Hypertension Father      Heart Disease Sister      Lipids Daughter      Heart Disease Sister      Breast Cancer Sister 64     Other Cancer Sister         bone , kidney     Cerebrovascular Disease Brother      Cardiovascular Brother      Diabetes Brother      Hypertension Brother      Hypertension Brother      Cancer - colorectal Daughter 41     Thyroid Disease No family hx of      Glaucoma No family hx of      Macular Degeneration No family hx of          Current Outpatient Medications   Medication Sig Dispense Refill     albuterol (VENTOLIN HFA) 108 (90 Base) MCG/ACT Inhaler Inhale 2 puffs into the lungs every 6 hours 3 Inhaler 1     amLODIPine (NORVASC) 5 MG tablet TAKE 1 TABLET EVERY DAY 90 tablet 0     atorvastatin (LIPITOR) 20 MG tablet Take 1 tablet (20 mg) by mouth daily 90 tablet 1     benzonatate (TESSALON) 200 MG capsule Take 1 capsule (200 mg) by mouth 3 times daily as needed for cough 90 capsule 3     CALCIUM 600/VITAMIN D OR 1 everyday       Cholecalciferol (VITAMIN D) 1000 UNIT capsule Take 1 capsule by mouth daily.       CLARITIN 10 MG OR TABS 1 TABLET DAILY       estradiol (ESTRACE VAGINAL) 0.1 MG/GM cream Use 2 grams vaginally every night for 2 weeks then use twice weekly for maintenancy 42.5 g 11     FISH OIL OR one everyday       GLUCOSAMINE 1500 COMPLEX OR one everyday       irbesartan (AVAPRO) 300 MG tablet TAKE 1 TABLET EVERY DAY 90 tablet 0     metoprolol succinate ER (TOPROL-XL) 100 MG 24 hr tablet TAKE 1 TABLET EVERY DAY 90  "tablet 0     omeprazole 20 MG tablet Take 1 tablet by mouth daily. Take 30-60 minutes before a meal. 30 tablet 1     ONE-A-DAY WEIGHT SMART ADVANCE OR 1 every other day       TYLENOL CAPS 500 MG OR 1 CAPSULE EVERY 4 HOURS AS NEEDED       umeclidinium (INCRUSE ELLIPTA) 62.5 MCG/INH inhaler INHALE 1 PUFF INTO THE LUNGS DAILY 3 Inhaler 1     umeclidinium (INCRUSE ELLIPTA) 62.5 MCG/INH oral inhaler Inhale 1 puff into the lungs daily 3 Inhaler 1     VITAMIN C OR 1 everyday       Allergies   Allergen Reactions     Lisinopril Swelling     Difficulty swallowing.     Ketorolac Swelling and Other (See Comments)     Eye drops     No Clinical Screening - See Comments Other (See Comments)     Diclofenac Rash     Around the left eye     Nickel Rash     Recent Labs   Lab Test 01/16/19  0809 07/25/18  0829 01/23/18  1554 01/02/18  0818  03/21/17  0749 09/27/16  1155  08/29/12  1104   A1C  --  6.2* 6.3*  --   --   --  6.1*   < > 5.9   LDL 89  --   --  73  --  75  --    < >  --    HDL 43*  --   --  49*  --  44*  --    < >  --    TRIG 241*  --   --  176*  --  250*  --    < >  --    CR 1.05* 1.06*  --  1.09*   < > 1.08*  --    < >  --    GFRESTIMATED 53* 51*  --  49*   < > 50*  --    < >  --    GFRESTBLACK 61 62  --  60*   < > 61  --    < >  --    POTASSIUM 4.9 4.2  --  4.8   < > 4.2  --    < >  --    TSH  --   --   --   --   --   --   --   --  1.02    < > = values in this interval not displayed.      Last 3 Pap and HPV Results:   PAP / HPV 8/8/2011   PAP NIL       Review of Systems  Constitutional, HEENT, cardiovascular, pulmonary, GI, , musculoskeletal, neuro, skin, endocrine and psych systems are negative, except as otherwise noted.    OBJECTIVE:   /76   Pulse 56   Wt 71.7 kg (158 lb)   SpO2 97%   BMI 27.12 kg/m   Estimated body mass index is 27.12 kg/m  as calculated from the following:    Height as of 1/23/18: 1.626 m (5' 4\").    Weight as of this encounter: 71.7 kg (158 lb).  Physical Exam  GENERAL APPEARANCE: " healthy, alert and no distress  EYES: Eyes grossly normal to inspection, PERRL and conjunctivae and sclerae normal  HENT: ear canals and TM's normal, nose and mouth without ulcers or lesions, oropharynx clear and oral mucous membranes moist  NECK: no adenopathy, no asymmetry, masses, or scars and thyroid normal to palpation  BREAST: refused exam  RESP: lungs clear to auscultation - no rales, rhonchi or wheezes  CV: regular rate and rhythm, normal S1 S2, no S3 or S4, no murmur, click or rub, no peripheral edema and peripheral pulses strong  ABDOMEN: soft, nontender, no hepatosplenomegaly, no masses and bowel sounds normal  MS: no musculoskeletal defects are noted and gait is age appropriate without ataxia  SKIN: no suspicious lesions or rashes  NEURO: Normal strength and tone, sensory exam grossly normal, mentation intact and speech normal  PSYCH: mentation appears normal and affect normal/bright    Diagnostic Test Results:  none     ASSESSMENT / PLAN:   (Z00.00) Medicare annual wellness visit, subsequent  (primary encounter diagnosis)  Comment: Doing well overall, no concerns  Plan: Patient will plan on getting second Zoster immunization next week        -Will schedule mammogram this summer, will continue breast exams in the meantime        -Refused colonoscopy in 2017, not interested today            (J43.9) Pulmonary emphysema, unspecified emphysema type (H)  Comment: Refill needed  Plan: albuterol (VENTOLIN HFA) 108 (90 Base) MCG/ACT         inhaler        -Medication refill sent to pharmacy    (I11.9,  I42.2) Hypertensive hypertrophic cardiomyopathy, without heart failure (H), (I10) Hypertension goal BP (blood pressure) < 150/90  Comment: Stable  Plan: amLODIPine (NORVASC) 5 MG tablet, irbesartan         (AVAPRO) 300 MG tablet, metoprolol succinate ER        (TOPROL-XL) 100 MG 24 hr tablet        -BMP and lipid panel reviewed from 1/16/2018, results not significantly changed from previous labs        -Blood  "pressure stable at today's visit        -Continue medications at current dose, refills sent today      (R73.01) Elevated fasting glucose  Comment: Increased glucose level on BMP warrants further investigation  Plan: Hemoglobin A1c        -Will contact with results and proceed accordingly    Follow up in 6 months for blood pressure check and non fasting labs      COUNSELING:  Reviewed preventive health counseling, as reflected in patient instructions       Regular exercise       Healthy diet/nutrition    BP Readings from Last 1 Encounters:   01/23/19 136/76     Estimated body mass index is 27.12 kg/m  as calculated from the following:    Height as of 1/23/18: 1.626 m (5' 4\").    Weight as of this encounter: 71.7 kg (158 lb).      Weight management plan: Discussed healthy diet and exercise guidelines     reports that she has been smoking cigarettes.  She has been smoking about 0.50 packs per day. she has never used smokeless tobacco.    Appropriate preventive services were discussed with this patient, including applicable screening as appropriate for cardiovascular disease, diabetes, osteopenia/osteoporosis, and glaucoma.  As appropriate for age/gender, discussed screening for colorectal cancer, prostate cancer, breast cancer, and cervical cancer. Checklist reviewing preventive services available has been given to the patient.    Reviewed patients plan of care and provided an AVS. The Basic Care Plan (routine screening as documented in Health Maintenance) for Kelsea meets the Care Plan requirement. This Care Plan has been established and reviewed with the Patient.    Counseling Resources:  ATP IV Guidelines  Pooled Cohorts Equation Calculator  Breast Cancer Risk Calculator  FRAX Risk Assessment  ICSI Preventive Guidelines  Dietary Guidelines for Americans, 2010  USDA's MyPlate  ASA Prophylaxis  Lung CA Screening    Maritza Lopez MD  [facility]  "

## 2019-01-23 NOTE — NURSING NOTE
"Chief Complaint   Patient presents with     Wellness Visit       Initial /76   Pulse 56   Wt 71.7 kg (158 lb)   SpO2 97%   BMI 27.12 kg/m   Estimated body mass index is 27.12 kg/m  as calculated from the following:    Height as of 1/23/18: 1.626 m (5' 4\").    Weight as of this encounter: 71.7 kg (158 lb)..  BP completed using cuff size: regular    "

## 2019-01-23 NOTE — LETTER
January 23, 2019    Kelsea Valentine  [address] [address] [address]            Dear Kelsea,    A1C still looks good, in prediabetic range.    Below is a copy of the results.  It was a pleasure to see you at your last appointment.    If you have any questions or concerns, please call myself or my nurse at [phone].    Sincerely,    Martiza Lopez MD / sarah    Results for orders placed or performed in visit on 01/23/19   Hemoglobin A1c   Result Value Ref Range    Hemoglobin A1C 6.1 (H) 0 - 5.6 %

## 2019-07-10 ENCOUNTER — TELEPHONE (OUTPATIENT)
Dept: FAMILY MEDICINE | Facility: CLINIC | Age: 74
End: 2019-07-10

## 2019-07-10 DIAGNOSIS — I10 HYPERTENSION GOAL BP (BLOOD PRESSURE) < 150/90: ICD-10-CM

## 2019-07-10 DIAGNOSIS — I42.2 HYPERTENSIVE HYPERTROPHIC CARDIOMYOPATHY, WITHOUT HEART FAILURE (H): ICD-10-CM

## 2019-07-10 DIAGNOSIS — I11.9 HYPERTENSIVE HYPERTROPHIC CARDIOMYOPATHY, WITHOUT HEART FAILURE (H): ICD-10-CM

## 2019-07-10 RX ORDER — METOPROLOL SUCCINATE 100 MG/1
TABLET, EXTENDED RELEASE ORAL
Qty: 90 TABLET | Refills: 0 | Status: SHIPPED | OUTPATIENT
Start: 2019-07-10 | End: 2019-08-13

## 2019-07-25 ENCOUNTER — DOCUMENTATION ONLY (OUTPATIENT)
Dept: LAB | Facility: CLINIC | Age: 74
End: 2019-07-25

## 2019-07-25 DIAGNOSIS — R73.09 ABNORMAL GLUCOSE: Primary | ICD-10-CM

## 2019-07-25 DIAGNOSIS — N18.30 CKD (CHRONIC KIDNEY DISEASE) STAGE 3, GFR 30-59 ML/MIN (H): ICD-10-CM

## 2019-07-25 DIAGNOSIS — I10 HYPERTENSION GOAL BP (BLOOD PRESSURE) < 140/90: ICD-10-CM

## 2019-07-25 NOTE — PROGRESS NOTES
Please review and sign Pending Pre-visit Labs in [facility]. Labs 08/06/19 and BP / A1C f/up  check 08/13/19  Katie CREWS

## 2019-08-06 DIAGNOSIS — N18.30 CKD (CHRONIC KIDNEY DISEASE) STAGE 3, GFR 30-59 ML/MIN (H): ICD-10-CM

## 2019-08-06 DIAGNOSIS — I10 HYPERTENSION GOAL BP (BLOOD PRESSURE) < 140/90: ICD-10-CM

## 2019-08-06 DIAGNOSIS — R73.09 ABNORMAL GLUCOSE: ICD-10-CM

## 2019-08-06 LAB
ANION GAP SERPL CALCULATED.3IONS-SCNC: 4 MMOL/L (ref 3–14)
BUN SERPL-MCNC: 16 MG/DL (ref 7–30)
CALCIUM SERPL-MCNC: 9.3 MG/DL (ref 8.5–10.1)
CHLORIDE SERPL-SCNC: 106 MMOL/L (ref 94–109)
CO2 SERPL-SCNC: 28 MMOL/L (ref 20–32)
CREAT SERPL-MCNC: 1.01 MG/DL (ref 0.52–1.04)
GFR SERPL CREATININE-BSD FRML MDRD: 55 ML/MIN/{1.73_M2}
GLUCOSE SERPL-MCNC: 105 MG/DL (ref 70–99)
HBA1C MFR BLD: 6.1 % (ref 0–5.6)
POTASSIUM SERPL-SCNC: 4.4 MMOL/L (ref 3.4–5.3)
SODIUM SERPL-SCNC: 138 MMOL/L (ref 133–144)

## 2019-08-06 PROCEDURE — 36415 COLL VENOUS BLD VENIPUNCTURE: CPT | Performed by: FAMILY MEDICINE

## 2019-08-06 PROCEDURE — 83036 HEMOGLOBIN GLYCOSYLATED A1C: CPT | Performed by: FAMILY MEDICINE

## 2019-08-06 PROCEDURE — 80048 BASIC METABOLIC PNL TOTAL CA: CPT | Performed by: FAMILY MEDICINE

## 2019-08-06 NOTE — PROGRESS NOTES
Subjective     Kelsea Valentine is a 74 year old female who presents to clinic today for the following health issues:    HPI   Hyperlipidemia Follow-Up      Are you having any of the following symptoms? (Select all that apply)  No complaints of shortness of breath, chest pain or pressure.  No increased sweating or nausea with activity.  No left-sided neck or arm pain.  No complaints of pain in calves when walking 1-2 blocks.    Are you regularly taking any medication or supplement to lower your cholesterol?   Yes- atorvastatin    Are you having muscle aches or other side effects that you think could be caused by your cholesterol lowering medication?  No      Hypertension Follow-up      Do you check your blood pressure regularly outside of the clinic? Yes checked occasionally 130/58    Are you following a low salt diet? Yes    Are your blood pressures ever more than 140 on the top number (systolic) OR more   than 90 on the bottom number (diastolic), for example 140/90? No       Hypertension ROS: taking medications as instructed, no medication side effects noted, no TIA's, no chest pain on exertion, notes stable dyspnea on exertion, no change, noting swelling of ankles with heat.  No headache or visual changes.        Amount of exercise or physical activity: Working 5 days per week     Problems taking medications regularly: No    Medication side effects: none    Diet: low salt          Patient Active Problem List   Diagnosis     Hyperlipidemia LDL goal <100     Hypertensive hypertrophic cardiomyopathy (H)     Mitral valve insufficiency     Hypertension goal BP (blood pressure) < 140/90     CKD (chronic kidney disease) stage 3, GFR 30-59 ml/min (H)     GERD (gastroesophageal reflux disease)     COPD (chronic obstructive pulmonary disease) (H)     Abnormal glucose     Advanced directives, counseling/discussion     Pseudophakia, ou     Benign neoplasm of colon, unspecified part of colon     Posterior vitreous detachment of  both eyes     Elevated hemoglobin (H)     Past Surgical History:   Procedure Laterality Date     CATARACT IOL, RT/LT       CHOLECYSTECTOMY, OPEN       COLONOSCOPY N/A 7/27/2015    Procedure: COLONOSCOPY;  Surgeon: Dilip Quintero MD;  Location: [facility] OR     COLONOSCOPY WITH CO2 INSUFFLATION N/A 7/27/2015    Procedure: COLONOSCOPY WITH CO2 INSUFFLATION;  Surgeon: Dilip Quintero MD;  Location: [facility] OR     HC TOOTH EXTRACTION W/FORCEP      false teeth     NON-SURGICAL SEPTAL REDUCTION THERAPY W/ CORONARY ARTERIOGRAMS, W/WO TEMPORARY PACEMAKER       PHACOEMULSIFICATION WITH STANDARD INTRAOCULAR LENS IMPLANT  03/2017; 3/2017    left eye; right eye     SURGICAL HISTORY OF -   04/2010    mitral valvuloplasty [place]       Social History     Tobacco Use     Smoking status: Current Some Day Smoker     Packs/day: 1.00     Years: 51.00     Pack years: 51.00     Types: Cigarettes     Start date: 1/15/1968     Smokeless tobacco: Never Used     Tobacco comment:  smokes   Substance Use Topics     Alcohol use: No     Family History   Problem Relation Age of Onset     Cancer Mother         stomach,bone     Alcohol/Drug Mother         smoker     C.A.D. Father      Cancer Father      Alcohol/Drug Father         smoker     Hypertension Father      Heart Disease Sister      Lipids Daughter      Heart Disease Sister      Breast Cancer Sister 64     Other Cancer Sister         bone , kidney     Brain Cancer Sister      Cerebrovascular Disease Brother      Cardiovascular Brother      Diabetes Brother      Hypertension Brother      Hypertension Brother      Cancer - colorectal Daughter 41     Thyroid Disease No family hx of      Glaucoma No family hx of      Macular Degeneration No family hx of          Current Outpatient Medications   Medication Sig Dispense Refill     albuterol (VENTOLIN HFA) 108 (90 Base) MCG/ACT inhaler Inhale 2 puffs into the lungs every 6 hours 1 Inhaler 3     amLODIPine (NORVASC) 5 MG tablet TAKE 1  TABLET EVERY DAY 90 tablet 1     atorvastatin (LIPITOR) 20 MG tablet Take 1 tablet (20 mg) by mouth daily 90 tablet 1     benzonatate (TESSALON) 200 MG capsule Take 1 capsule (200 mg) by mouth 3 times daily as needed for cough 90 capsule 3     CALCIUM 600/VITAMIN D OR 1 everyday       Cholecalciferol (VITAMIN D) 1000 UNIT capsule Take 1 capsule by mouth daily.       CLARITIN 10 MG OR TABS 1 TABLET DAILY       estradiol (ESTRACE VAGINAL) 0.1 MG/GM cream Use 2 grams vaginally every night for 2 weeks then use twice weekly for maintenancy 42.5 g 11     FISH OIL OR one everyday       GLUCOSAMINE 1500 COMPLEX OR one everyday       irbesartan (AVAPRO) 300 MG tablet TAKE 1 TABLET EVERY DAY 90 tablet 1     metoprolol succinate ER (TOPROL-XL) 100 MG 24 hr tablet TAKE 1 TABLET EVERY DAY 90 tablet 1     omeprazole 20 MG tablet Take 1 tablet by mouth daily. Take 30-60 minutes before a meal. 30 tablet 1     ONE-A-DAY WEIGHT SMART ADVANCE OR 1 every other day       TYLENOL CAPS 500 MG OR 1 CAPSULE EVERY 4 HOURS AS NEEDED       umeclidinium (INCRUSE ELLIPTA) 62.5 MCG/INH inhaler INHALE 1 PUFF INTO THE LUNGS DAILY 3 Inhaler 1     VITAMIN C OR 1 everyday       Recent Labs   Lab Test 08/06/19  0750 01/23/19  1258 01/16/19  0809 07/25/18  0829  01/02/18  0818  03/21/17  0749  08/29/12  1104   A1C 6.1* 6.1*  --  6.2*   < >  --   --   --    < > 5.9   LDL  --   --  89  --   --  73  --  75   < >  --    HDL  --   --  43*  --   --  49*  --  44*   < >  --    TRIG  --   --  241*  --   --  176*  --  250*   < >  --    CR 1.01  --  1.05* 1.06*  --  1.09*   < > 1.08*   < >  --    GFRESTIMATED 55*  --  53* 51*  --  49*   < > 50*   < >  --    GFRESTBLACK 64  --  61 62  --  60*   < > 61   < >  --    POTASSIUM 4.4  --  4.9 4.2  --  4.8   < > 4.2   < >  --    TSH  --   --   --   --   --   --   --   --   --  1.02    < > = values in this interval not displayed.      BP Readings from Last 3 Encounters:   08/13/19 132/66   01/23/19 136/76   07/31/18 136/72  "   Wt Readings from Last 3 Encounters:   08/13/19 73.9 kg (163 lb)   01/23/19 71.7 kg (158 lb)   07/31/18 72.9 kg (160 lb 12.8 oz)                    Reviewed and updated as needed this visit by Provider  Tobacco  Allergies  Meds  Problems  Med Hx  Surg Hx  Fam Hx         Review of Systems   ROS COMP: Constitutional, HEENT, cardiovascular, pulmonary, gi and gu systems are negative, except as otherwise noted.      Objective    /66   Pulse 60   Temp 98.2  F (36.8  C) (Oral)   Resp 20   Ht 1.575 m (5' 2\")   Wt 73.9 kg (163 lb)   SpO2 95%   BMI 29.81 kg/m    Body mass index is 29.81 kg/m .  Physical Exam   GENERAL: healthy, alert and no distress, smells of tobacco  EYES: Eyes grossly normal to inspection, PERRL and conjunctivae and sclerae normal  RESP: lungs clear to auscultation - no rales, rhonchi or wheezes  CV: regular rate and rhythm, normal S1 S2, no S3 or S4, no murmur, click or rub, no peripheral edema and peripheral pulses strong  MS: no gross musculoskeletal defects noted, no edema  SKIN: no suspicious lesions or rashes  PSYCH: mentation appears normal, affect normal/bright    Diagnostic Test Results:  Labs reviewed in Epic        Assessment & Plan     (J43.9) Pulmonary emphysema, unspecified emphysema type (H)  (primary encounter diagnosis)  Comment: controlled  Plan: albuterol (VENTOLIN HFA) 108 (90 Base) MCG/ACT         inhaler, umeclidinium (INCRUSE ELLIPTA) 62.5         MCG/INH inhaler, benzonatate (TESSALON) 200 MG         capsule         Refill x 6 months     (I11.9,  I42.2) Hypertensive hypertrophic cardiomyopathy, without heart failure (H)  Comment: stable  Plan: amLODIPine (NORVASC) 5 MG tablet, atorvastatin         (LIPITOR) 20 MG tablet, irbesartan (AVAPRO) 300        MG tablet, metoprolol succinate ER (TOPROL-XL)         100 MG 24 hr tablet         Refill x 6 months f/u 6 months for OV and labs wellness exam    (N18.3) CKD (chronic kidney disease) stage 3, GFR 30-59 ml/min " "(H)  Comment: stable  Plan: Albumin Random Urine Quantitative with Creat         Ratio        Due for urine test    (D58.2) Elevated hemoglobin (H)  Comment: due to tobacco use  Plan: encouraged to quit, declines lung cancer screening at this time    (I10) Hypertension goal BP (blood pressure) < 150/90  Comment: to goal  Plan: amLODIPine (NORVASC) 5 MG tablet, metoprolol         succinate ER (TOPROL-XL) 100 MG 24 hr tablet         Refill x 6 months     (Z12.31) Encounter for screening mammogram for breast cancer  Comment: due  Plan: MA SCREENING DIGITAL BILAT - Future  (s+30)               Tobacco Cessation:   reports that she has been smoking cigarettes.  She started smoking about 51 years ago. She has a 51.00 pack-year smoking history. She has never used smokeless tobacco.  Tobacco Cessation Action Plan: Information offered: Patient not interested at this time  declines lung cancer screening at this time      BMI:   Estimated body mass index is 29.81 kg/m  as calculated from the following:    Height as of this encounter: 1.575 m (5' 2\").    Weight as of this encounter: 73.9 kg (163 lb).           Patient Instructions     Continue all medications.      Consider CT scan to screen for lung cancer next year.            Return in about 6 months (around 2/13/2020) for Wellness Exam.    Maritza Lopez MD  [facility]    "

## 2019-08-13 ENCOUNTER — OFFICE VISIT (OUTPATIENT)
Dept: FAMILY MEDICINE | Facility: CLINIC | Age: 74
End: 2019-08-13
Payer: COMMERCIAL

## 2019-08-13 VITALS
TEMPERATURE: 98.2 F | OXYGEN SATURATION: 95 % | WEIGHT: 163 LBS | HEART RATE: 60 BPM | SYSTOLIC BLOOD PRESSURE: 132 MMHG | BODY MASS INDEX: 30 KG/M2 | DIASTOLIC BLOOD PRESSURE: 66 MMHG | RESPIRATION RATE: 20 BRPM | HEIGHT: 62 IN

## 2019-08-13 DIAGNOSIS — I10 HYPERTENSION GOAL BP (BLOOD PRESSURE) < 150/90: ICD-10-CM

## 2019-08-13 DIAGNOSIS — N18.30 CKD (CHRONIC KIDNEY DISEASE) STAGE 3, GFR 30-59 ML/MIN (H): ICD-10-CM

## 2019-08-13 DIAGNOSIS — R73.09 ABNORMAL GLUCOSE: ICD-10-CM

## 2019-08-13 DIAGNOSIS — J43.9 PULMONARY EMPHYSEMA, UNSPECIFIED EMPHYSEMA TYPE (H): Primary | ICD-10-CM

## 2019-08-13 DIAGNOSIS — D58.2 ELEVATED HEMOGLOBIN (H): ICD-10-CM

## 2019-08-13 DIAGNOSIS — I10 HYPERTENSION GOAL BP (BLOOD PRESSURE) < 140/90: ICD-10-CM

## 2019-08-13 DIAGNOSIS — Z12.31 ENCOUNTER FOR SCREENING MAMMOGRAM FOR BREAST CANCER: ICD-10-CM

## 2019-08-13 DIAGNOSIS — I11.9 HYPERTENSIVE HYPERTROPHIC CARDIOMYOPATHY, WITHOUT HEART FAILURE (H): ICD-10-CM

## 2019-08-13 DIAGNOSIS — I42.2 HYPERTENSIVE HYPERTROPHIC CARDIOMYOPATHY, WITHOUT HEART FAILURE (H): ICD-10-CM

## 2019-08-13 LAB
CREAT UR-MCNC: 39 MG/DL
MICROALBUMIN UR-MCNC: 113 MG/L
MICROALBUMIN/CREAT UR: 286.8 MG/G CR (ref 0–25)

## 2019-08-13 PROCEDURE — 99207 C PAF COMPLETED  NO CHARGE: CPT | Performed by: FAMILY MEDICINE

## 2019-08-13 PROCEDURE — 99214 OFFICE O/P EST MOD 30 MIN: CPT | Performed by: FAMILY MEDICINE

## 2019-08-13 PROCEDURE — 82043 UR ALBUMIN QUANTITATIVE: CPT | Performed by: FAMILY MEDICINE

## 2019-08-13 RX ORDER — BENZONATATE 200 MG/1
200 CAPSULE ORAL 3 TIMES DAILY PRN
Qty: 90 CAPSULE | Refills: 3 | Status: SHIPPED | OUTPATIENT
Start: 2019-08-13 | End: 2020-02-25

## 2019-08-13 RX ORDER — IRBESARTAN 300 MG/1
TABLET ORAL
Qty: 90 TABLET | Refills: 1 | Status: SHIPPED | OUTPATIENT
Start: 2019-08-13 | End: 2020-02-25

## 2019-08-13 RX ORDER — ALBUTEROL SULFATE 90 UG/1
2 AEROSOL, METERED RESPIRATORY (INHALATION) EVERY 6 HOURS
Qty: 1 INHALER | Refills: 3 | Status: SHIPPED | OUTPATIENT
Start: 2019-08-13 | End: 2020-02-25

## 2019-08-13 RX ORDER — ATORVASTATIN CALCIUM 20 MG/1
20 TABLET, FILM COATED ORAL DAILY
Qty: 90 TABLET | Refills: 1 | Status: SHIPPED | OUTPATIENT
Start: 2019-08-13 | End: 2020-02-25

## 2019-08-13 RX ORDER — METOPROLOL SUCCINATE 100 MG/1
TABLET, EXTENDED RELEASE ORAL
Qty: 90 TABLET | Refills: 1 | Status: SHIPPED | OUTPATIENT
Start: 2019-08-13 | End: 2020-02-06

## 2019-08-13 RX ORDER — AMLODIPINE BESYLATE 5 MG/1
TABLET ORAL
Qty: 90 TABLET | Refills: 1 | Status: SHIPPED | OUTPATIENT
Start: 2019-08-13 | End: 2020-02-25

## 2019-08-13 ASSESSMENT — MIFFLIN-ST. JEOR: SCORE: 1202.61

## 2019-08-13 NOTE — NURSING NOTE
"Chief Complaint   Patient presents with     Hypertension     Lipids       Initial BP (!) 147/68   Pulse 60   Temp 98.2  F (36.8  C) (Oral)   Resp 20   Ht 1.575 m (5' 2\")   Wt 73.9 kg (163 lb)   SpO2 95%   BMI 29.81 kg/m   Estimated body mass index is 29.81 kg/m  as calculated from the following:    Height as of this encounter: 1.575 m (5' 2\").    Weight as of this encounter: 73.9 kg (163 lb).  Medication Reconciliation: complete  Low Nair CMA    "

## 2019-08-15 ENCOUNTER — TELEPHONE (OUTPATIENT)
Dept: FAMILY MEDICINE | Facility: CLINIC | Age: 74
End: 2019-08-15

## 2019-09-20 ENCOUNTER — ANCILLARY PROCEDURE (OUTPATIENT)
Dept: MAMMOGRAPHY | Facility: CLINIC | Age: 74
End: 2019-09-20
Payer: COMMERCIAL

## 2019-09-20 DIAGNOSIS — Z12.31 ENCOUNTER FOR SCREENING MAMMOGRAM FOR BREAST CANCER: ICD-10-CM

## 2019-09-20 PROCEDURE — 77067 SCR MAMMO BI INCL CAD: CPT | Mod: TC

## 2020-01-01 NOTE — PROGRESS NOTES
Need previsit labs-See pending orders and close encounter./Brenda Ferreira,         BP/Chol check 9/26/17     Nursing notes reviewed and accepted.    Boaz Joe is a 8 week old female who presents for 2 month old well child exam.  Patient presents with Father and Mother.    Concerns raised today include: none    Feeding: breast is adequate.  Sleeping: No sleep or behavioral concerns.  Elimination:  Normal wet diapers and bowel movements.    SOCIAL:  Primary caretakers: parents  Support at home:  Yes  Smoke exposure: none    DEVELOPMENT:  vocalizes, smiles responsively, follows to mid line and responds to sounds    Birth history, medical history, surgical history, and family history reviewed and updated.    REVIEW OF SYSTEMS see HPI; otherwise denies HEENT, NECK, RESP, CARDIAC, GI, , NEURO or PSYCH Sx      PHYSICAL EXAM:  Height 23.23\" (59 cm), weight 5.415 kg, head circumference 39 cm (15.35\").  GENERAL:  Well appearing  female, nontoxic, no acute distress.  Alert and     interactive.  SKIN: Warm, normal turgor.  No cyanosis.  No bruises or lesions.  HEAD:  Normocephalic, atraumatic.  Anterior fontanel open, soft and flat.  EYES:  Conjunctivae appear normal with neither icterus nor subconjunctival hemorrhage.  Pupils equal, round, reactive to light; extraocular movements intact; positive red reflex bilaterally.  NOSE:  Appears normal, no flaring.  EARS:  Normal pinnae, no preauricular skin tags or pit.  Tympanic membranes are transparent with good    landmarks.  THROAT:  Oropharynx with moist mucous membranes and no lesions.  NECK:  Supple, no lymphadenopathy or masses.  HEART:  Regular rate and rhythm.  Quiet precordium.  Normal S1, S2.  No murmurs, rubs, gallops.   LUNGS:  Clear to auscultation bilaterally.  No wheezes, rales, rhonchi.  Normal work of breathing.  ABDOMEN:  Umbilical stump is normal with small umbilical hernia.  Soft, nontender.  No organomegaly or masses.  GENITOURINARY: [place] 1 female   MUSCULOSKELETAL:  Hips within normal range of motion.  Negative Merrill, Ortolani.  Spine straight.   Normal sacrum.  EXTREMITIES:  Warm, dry, without abnormalities.  NEUROLOGIC:  Normal tone, bulk, strength.    ASSESSMENT:  8 week old female well child.    PLAN:   All parental concerns and questions discussed.  Anticipatory guidance provided, handout given.                Fever management                Sleep management                Sudden infant death syndrome prevention                Accident prevention: car seat, crib, water heater temperature                Delay solids until 6 months                Tobacco-free home                Vitamin D supplementation: recommended  Immunizations per orders.  Risks, benefits, and side effects of each component discussed.  Return to clinic for 4 month well child examination or sooner prn illness/concerns.

## 2020-02-05 DIAGNOSIS — I42.2 HYPERTENSIVE HYPERTROPHIC CARDIOMYOPATHY, WITHOUT HEART FAILURE (H): ICD-10-CM

## 2020-02-05 DIAGNOSIS — I10 HYPERTENSION GOAL BP (BLOOD PRESSURE) < 150/90: ICD-10-CM

## 2020-02-05 DIAGNOSIS — I11.9 HYPERTENSIVE HYPERTROPHIC CARDIOMYOPATHY, WITHOUT HEART FAILURE (H): ICD-10-CM

## 2020-02-06 RX ORDER — METOPROLOL SUCCINATE 100 MG/1
TABLET, EXTENDED RELEASE ORAL
Qty: 90 TABLET | Refills: 1 | Status: SHIPPED | OUTPATIENT
Start: 2020-02-06 | End: 2020-06-15

## 2020-02-14 ENCOUNTER — DOCUMENTATION ONLY (OUTPATIENT)
Dept: LAB | Facility: CLINIC | Age: 75
End: 2020-02-14

## 2020-02-14 DIAGNOSIS — R73.03 PREDIABETES: ICD-10-CM

## 2020-02-14 DIAGNOSIS — I10 HYPERTENSION GOAL BP (BLOOD PRESSURE) < 140/90: Primary | ICD-10-CM

## 2020-02-14 DIAGNOSIS — E78.5 HYPERLIPIDEMIA LDL GOAL <100: ICD-10-CM

## 2020-02-17 NOTE — PATIENT INSTRUCTIONS
Patient Education   Personalized Prevention Plan  You are due for the preventive services outlined below.  Your care team is available to assist you in scheduling these services.  If you have already completed any of these items, please share that information with your care team to update in your medical record.  Health Maintenance Due   Topic Date Due     Colonoscopy  09/26/2019     PHQ-2  01/01/2020     Cholesterol Lab  01/16/2020     A1C Lab  02/06/2020     Basic Metabolic Panel  02/06/2020     Annual Wellness Visit  01/23/2020

## 2020-02-17 NOTE — PROGRESS NOTES
"SUBJECTIVE:   Kelsea Valentine is a 75 year old female who presents for Preventive Visit.      Are you in the first 12 months of your Medicare coverage?  No    Healthy Habits:     In general, how would you rate your overall health?  Good    Frequency of exercise:  4-5 days/week    Duration of exercise:  Greater than 60 minutes    Do you usually eat at least 4 servings of fruit and vegetables a day, include whole grains    & fiber and avoid regularly eating high fat or \"junk\" foods?  Yes    Taking medications regularly:  Yes    Medication side effects:  None    Ability to successfully perform activities of daily living:  No assistance needed    Home Safety:  No safety concerns identified    Hearing Impairment:  No hearing concerns    In the past 6 months, have you been bothered by leaking of urine? Yes    In general, how would you rate your overall mental or emotional health?  Good      PHQ-2 Total Score: 0    Additional concerns today:  Yes    Do you feel safe in your environment? Yes    Have you ever done Advance Care Planning? (For example, a Health Directive, POLST, or a discussion with a medical provider or your loved ones about your wishes): Declined information today.        Fall risk  Fallen 2 or more times in the past year?: No  Any fall with injury in the past year?: No    Cognitive Screening   1) Repeat 3 items (Leader, Season, Table)    2) Clock draw: NORMAL  3) 3 item recall: Recalls 2 objects   Results: NORMAL clock, 1-2 items recalled: COGNITIVE IMPAIRMENT LESS LIKELY    Mini-CogTM Copyright STEPHANIE Cassidy. Licensed by the author for use in [facility]; reprinted with permission (prateek@.[facility]). All rights reserved.      Do you have sleep apnea, excessive snoring or daytime drowsiness?: snoring    Reviewed and updated as needed this visit by clinical staff  Tobacco  Allergies  Meds  Problems  Med Hx  Surg Hx  Fam Hx  Soc Hx          Reviewed and updated as needed this visit by " Provider  Tobacco  Allergies  Meds  Problems  Med Hx  Surg Hx  Fam Hx        Social History     Tobacco Use     Smoking status: Current Some Day Smoker     Packs/day: 1.00     Years: 51.00     Pack years: 51.00     Types: Cigarettes     Start date: 1/15/1968     Smokeless tobacco: Never Used     Tobacco comment:  smokes   Substance Use Topics     Alcohol use: No         Alcohol Use 2/25/2020   Prescreen: >3 drinks/day or >7 drinks/week? No   Prescreen: >3 drinks/day or >7 drinks/week? -               Current providers sharing in care for this patient include:   Patient Care Team:  Maritza Lopez MD as PCP - General (Family Practice)  Maritza Lopez MD as Assigned PCP    The following health maintenance items are reviewed in Epic and correct as of today:  Health Maintenance   Topic Date Due     COLONOSCOPY  09/26/2019     MEDICARE ANNUAL WELLNESS VISIT  01/23/2020     FALL RISK ASSESSMENT  08/13/2020     A1C  08/18/2020     BMP  08/18/2020     ADVANCE CARE PLANNING  09/30/2020     LIPID  02/18/2021     MICROALBUMIN  02/18/2021     DTAP/TDAP/TD IMMUNIZATION (3 - Td) 08/08/2021     MAMMO SCREENING  09/20/2021     SPIROMETRY  Completed     HEPATITIS C SCREENING  Completed     COPD ACTION PLAN  Completed     PHQ-2  Completed     INFLUENZA VACCINE  Completed     PNEUMOCOCCAL IMMUNIZATION 65+ LOW/MEDIUM RISK  Completed     ZOSTER IMMUNIZATION  Completed     DEXA  Addressed     IPV IMMUNIZATION  Aged Out     MENINGITIS IMMUNIZATION  Aged Out     G 1 P 1   No LMP recorded. Patient is postmenopausal.     Fasting: No   Td: [id number]       NO - age 65 - see link Cervical Cytology Screening Guidelines               Cholesterol:   Lab Results   Component Value Date    CHOL 170 02/18/2020     Lab Results   Component Value Date    HDL 46 02/18/2020     Lab Results   Component Value Date    LDL 92 02/18/2020     Lab Results   Component Value Date    TRIG 160 02/18/2020     Lab Results   Component Value  Date    CHOLHDLRATIO 4.6 2015         MM/19  Dexa:  declined     Flex/colo: 7/15 q2 yr, declines      Seat Belt: Yes    Sunscreen use: Yes   Calcium Intake: adeq  Health Care Directive: no  Sexually Active: Yes     Current contraception: none  History of abnormal Pap smear: Yes: see [facility]  Family history of colon/breast/ovarian cancer: Yes: see [facility]  Regular self breast exam: Yes  History of abnormal mammogram: No      BP Readings from Last 3 Encounters:   20 139/64   19 132/66   19 136/76    Wt Readings from Last 3 Encounters:   20 72.6 kg (160 lb)   19 73.9 kg (163 lb)   19 71.7 kg (158 lb)                  Patient Active Problem List   Diagnosis     Hyperlipidemia LDL goal <100     Hypertensive hypertrophic cardiomyopathy (H)     Mitral valve insufficiency     Hypertension goal BP (blood pressure) < 140/90     CKD (chronic kidney disease) stage 3, GFR 30-59 ml/min (H)     GERD (gastroesophageal reflux disease)     COPD (chronic obstructive pulmonary disease) (H)     Abnormal glucose     Advanced directives, counseling/discussion     Pseudophakia, ou     Benign neoplasm of colon, unspecified part of colon     Posterior vitreous detachment of both eyes     Elevated hemoglobin (H)     Past Surgical History:   Procedure Laterality Date     CATARACT IOL, RT/LT       CHOLECYSTECTOMY, OPEN       COLONOSCOPY N/A 2015    Procedure: COLONOSCOPY;  Surgeon: Dilip Quintero MD;  Location: [facility] OR     COLONOSCOPY WITH CO2 INSUFFLATION N/A 2015    Procedure: COLONOSCOPY WITH CO2 INSUFFLATION;  Surgeon: Dilip Quintero MD;  Location: [facility] OR     HC TOOTH EXTRACTION W/FORCEP      false teeth     NON-SURGICAL SEPTAL REDUCTION THERAPY W/ CORONARY ARTERIOGRAMS, W/WO TEMPORARY PACEMAKER       PHACOEMULSIFICATION WITH STANDARD INTRAOCULAR LENS IMPLANT  2017; 3/2017    left eye; right eye     SURGICAL HISTORY OF -   2010    mitral valvuloplasty Ludwig       Social  History     Tobacco Use     Smoking status: Current Some Day Smoker     Packs/day: 1.00     Years: 51.00     Pack years: 51.00     Types: Cigarettes     Start date: 1/15/1968     Smokeless tobacco: Never Used     Tobacco comment:  smokes   Substance Use Topics     Alcohol use: No     Family History   Problem Relation Age of Onset     Cancer Mother         stomach,bone     Alcohol/Drug Mother         smoker     C.A.D. Father      Cancer Father      Alcohol/Drug Father         smoker     Hypertension Father      Heart Disease Sister      Lipids Daughter      Heart Disease Sister      Breast Cancer Sister 64     Other Cancer Sister         bone , kidney     Brain Cancer Sister      Cerebrovascular Disease Brother      Cardiovascular Brother      Diabetes Brother      Hypertension Brother      Hypertension Brother      Cancer - colorectal Daughter 41     Thyroid Disease No family hx of      Glaucoma No family hx of      Macular Degeneration No family hx of          Current Outpatient Medications   Medication Sig Dispense Refill     albuterol (VENTOLIN HFA) 108 (90 Base) MCG/ACT inhaler Inhale 2 puffs into the lungs every 6 hours 1 Inhaler 3     amLODIPine (NORVASC) 5 MG tablet TAKE 1 TABLET EVERY DAY 90 tablet 1     atorvastatin (LIPITOR) 20 MG tablet Take 1 tablet (20 mg) by mouth daily 90 tablet 1     benzonatate (TESSALON) 200 MG capsule Take 1 capsule (200 mg) by mouth 3 times daily as needed for cough 90 capsule 3     CALCIUM 600/VITAMIN D OR 1 everyday       Cholecalciferol (VITAMIN D) 1000 UNIT capsule Take 1 capsule by mouth daily.       CLARITIN 10 MG OR TABS 1 TABLET DAILY       estradiol (ESTRACE VAGINAL) 0.1 MG/GM cream Use 2 grams vaginally every night for 2 weeks then use twice weekly for maintenancy 42.5 g 11     FISH OIL OR one everyday       GLUCOSAMINE 1500 COMPLEX OR one everyday       irbesartan (AVAPRO) 300 MG tablet TAKE 1 TABLET EVERY DAY 90 tablet 1     metoprolol succinate ER (TOPROL-XL)  100 MG 24 hr tablet TAKE 1 TABLET EVERY DAY 90 tablet 1     omeprazole 20 MG tablet Take 1 tablet by mouth daily. Take 30-60 minutes before a meal. 30 tablet 1     ONE-A-DAY WEIGHT SMART ADVANCE OR 1 every other day       TYLENOL CAPS 500 MG OR 1 CAPSULE EVERY 4 HOURS AS NEEDED       umeclidinium (INCRUSE ELLIPTA) 62.5 MCG/INH inhaler INHALE 1 PUFF INTO THE LUNGS DAILY 3 Inhaler 0     VITAMIN C OR 1 everyday       Recent Labs   Lab Test 02/18/20  0803 08/06/19  0750 01/23/19  1258 01/16/19  0809  01/02/18  0818  08/29/12  1104   A1C 6.3* 6.1* 6.1*  --    < >  --    < > 5.9   LDL 92  --   --  89  --  73   < >  --    HDL 46*  --   --  43*  --  49*   < >  --    TRIG 160*  --   --  241*  --  176*   < >  --    CR 0.98 1.01  --  1.05*   < > 1.09*   < >  --    GFRESTIMATED 58* 55*  --  53*   < > 49*   < >  --    GFRESTBLACK 67 64  --  61   < > 60*   < >  --    POTASSIUM 4.4 4.4  --  4.9   < > 4.8   < >  --    TSH  --   --   --   --   --   --   --  1.02    < > = values in this interval not displayed.      Pneumonia Vaccine:completed  Mammogram Screening: Mammogram Screening: Patient over age 50, mutual decision to screen reflected in health maintenance.  Shingrix: completed  Flu: completed    Review of Systems   Constitutional: Negative for chills and fever.   HENT: Positive for congestion. Negative for ear pain, hearing loss and sore throat.    Eyes: Negative for pain and visual disturbance.   Respiratory: Positive for cough and shortness of breath.    Cardiovascular: Negative for chest pain, palpitations and peripheral edema.   Gastrointestinal: Negative for abdominal pain, constipation, diarrhea, heartburn, hematochezia and nausea.   Breasts:  Negative for tenderness, breast mass and discharge.   Genitourinary: Negative for dysuria, frequency, genital sores, hematuria, pelvic pain, urgency, vaginal bleeding and vaginal discharge.   Musculoskeletal: Negative for arthralgias, joint swelling and myalgias.   Skin: Negative  "for rash.   Neurological: Positive for headaches. Negative for dizziness, weakness and paresthesias.   Psychiatric/Behavioral: Negative for mood changes. The patient is not nervous/anxious.          OBJECTIVE:   /64   Pulse 59   Temp 97.7  F (36.5  C) (Oral)   Resp 18   Ht 1.575 m (5' 2\")   Wt 72.6 kg (160 lb)   SpO2 95%   BMI 29.26 kg/m   Estimated body mass index is 29.26 kg/m  as calculated from the following:    Height as of this encounter: 1.575 m (5' 2\").    Weight as of this encounter: 72.6 kg (160 lb).  Physical Exam  GENERAL APPEARANCE: healthy, alert and no distress  EYES: Eyes grossly normal to inspection, PERRL and conjunctivae and sclerae normal  HENT: ear canals and TM's normal, nose and mouth without ulcers or lesions, oropharynx clear and oral mucous membranes moist  NECK: no adenopathy, no asymmetry, masses, or scars and thyroid normal to palpation  RESP: no rales , no rhonchi, expiratory wheezes throughout and inspiratory wheezes throughout  BREAST: normal without masses, tenderness or nipple discharge and no palpable axillary masses or adenopathy  CV: regular rate and rhythm, normal S1 S2, no S3 or S4, no murmur, click or rub, no peripheral edema and peripheral pulses strong  ABDOMEN: soft, nontender, no hepatosplenomegaly, no masses and bowel sounds normal  MS: no musculoskeletal defects are noted and gait is age appropriate without ataxia  SKIN: no suspicious lesions or rashes  NEURO: Normal strength and tone, sensory exam grossly normal, mentation intact and speech normal  PSYCH: mentation appears normal and affect normal/bright    Diagnostic Test Results:  Labs reviewed in Epic    ASSESSMENT / PLAN:   (Z00.00) Encounter for Medicare annual wellness exam  (primary encounter diagnosis)  Comment: preventive needs reviewed   Plan: see orders in Epic.     (J43.9) Pulmonary emphysema, unspecified emphysema type (H)  Comment: + exacerbation  Plan: albuterol (VENTOLIN HFA) 108 (90 Base) " "MCG/ACT         inhaler, benzonatate (TESSALON) 200 MG capsule,        umeclidinium (INCRUSE ELLIPTA) 62.5 MCG/INH         inhaler, predniSONE (DELTASONE) 50 MG tablet        Prednisone burst x 5 days  Continue all other meds, f/u if no improvement in 5 days  Continue to try to stop smoking    (D58.2) Elevated hemoglobin (H)  Comment: due to smoking  Plan: continue to monitor    (I11.9,  I42.2) Hypertensive hypertrophic cardiomyopathy, without heart failure (H)  Comment: stable  Plan: amLODIPine (NORVASC) 5 MG tablet, atorvastatin         (LIPITOR) 20 MG tablet, irbesartan (AVAPRO) 300        MG tablet         Refill x 6 months   f/u 6 months for OV and labs     (N18.3) CKD (chronic kidney disease) stage 3, GFR 30-59 ml/min (H)  (R80.9) Microalbuminuria  Comment: significant increase in urine microalbumin  Plan: NEPHROLOGY ADULT REFERRAL            (I10) Hypertension goal BP (blood pressure) < 150/90  Comment: to goal  Plan: amLODIPine (NORVASC) 5 MG tablet         Refill x 6 months   f/u 6 months for OV and labs       COUNSELING:  Reviewed preventive health counseling, as reflected in patient instructions  Special attention given to:       Regular exercise       Healthy diet/nutrition    Estimated body mass index is 29.26 kg/m  as calculated from the following:    Height as of this encounter: 1.575 m (5' 2\").    Weight as of this encounter: 72.6 kg (160 lb).         reports that she has been smoking cigarettes. She started smoking about 52 years ago. She has a 51.00 pack-year smoking history. She has never used smokeless tobacco.  Tobacco Cessation Action Plan: Information offered: Patient not interested at this time    Appropriate preventive services were discussed with this patient, including applicable screening as appropriate for cardiovascular disease, diabetes, osteopenia/osteoporosis, and glaucoma.  As appropriate for age/gender, discussed screening for colorectal cancer, prostate cancer, breast cancer, and " cervical cancer. Checklist reviewing preventive services available has been given to the patient.    Reviewed patients plan of care and provided an AVS. The Basic Care Plan (routine screening as documented in Health Maintenance) for Kelsea meets the Care Plan requirement. This Care Plan has been established and reviewed with the Patient.    Counseling Resources:  ATP IV Guidelines  Pooled Cohorts Equation Calculator  Breast Cancer Risk Calculator  FRAX Risk Assessment  ICSI Preventive Guidelines  Dietary Guidelines for Americans, 2010  USDA's MyPlate  ASA Prophylaxis  Lung CA Screening    Maritza Lopez MD  [facility]    Identified Health Risks:

## 2020-02-18 DIAGNOSIS — I10 HYPERTENSION GOAL BP (BLOOD PRESSURE) < 140/90: ICD-10-CM

## 2020-02-18 DIAGNOSIS — N18.30 CKD (CHRONIC KIDNEY DISEASE) STAGE 3, GFR 30-59 ML/MIN (H): ICD-10-CM

## 2020-02-18 DIAGNOSIS — J43.9 PULMONARY EMPHYSEMA, UNSPECIFIED EMPHYSEMA TYPE (H): ICD-10-CM

## 2020-02-18 DIAGNOSIS — R73.03 PREDIABETES: ICD-10-CM

## 2020-02-18 DIAGNOSIS — E78.5 HYPERLIPIDEMIA LDL GOAL <100: ICD-10-CM

## 2020-02-18 LAB
ANION GAP SERPL CALCULATED.3IONS-SCNC: 7 MMOL/L (ref 3–14)
BUN SERPL-MCNC: 18 MG/DL (ref 7–30)
CALCIUM SERPL-MCNC: 9.5 MG/DL (ref 8.5–10.1)
CHLORIDE SERPL-SCNC: 103 MMOL/L (ref 94–109)
CHOLEST SERPL-MCNC: 170 MG/DL
CO2 SERPL-SCNC: 25 MMOL/L (ref 20–32)
CREAT SERPL-MCNC: 0.98 MG/DL (ref 0.52–1.04)
CREAT UR-MCNC: 88 MG/DL
GFR SERPL CREATININE-BSD FRML MDRD: 58 ML/MIN/{1.73_M2}
GLUCOSE SERPL-MCNC: 117 MG/DL (ref 70–99)
HBA1C MFR BLD: 6.3 % (ref 0–5.6)
HDLC SERPL-MCNC: 46 MG/DL
LDLC SERPL CALC-MCNC: 92 MG/DL
MICROALBUMIN UR-MCNC: 941 MG/L
MICROALBUMIN/CREAT UR: 1071.75 MG/G CR (ref 0–25)
NONHDLC SERPL-MCNC: 124 MG/DL
POTASSIUM SERPL-SCNC: 4.4 MMOL/L (ref 3.4–5.3)
SODIUM SERPL-SCNC: 135 MMOL/L (ref 133–144)
TRIGL SERPL-MCNC: 160 MG/DL

## 2020-02-18 PROCEDURE — 36415 COLL VENOUS BLD VENIPUNCTURE: CPT | Performed by: FAMILY MEDICINE

## 2020-02-18 PROCEDURE — 80048 BASIC METABOLIC PNL TOTAL CA: CPT | Performed by: FAMILY MEDICINE

## 2020-02-18 PROCEDURE — 80061 LIPID PANEL: CPT | Performed by: FAMILY MEDICINE

## 2020-02-18 PROCEDURE — 82043 UR ALBUMIN QUANTITATIVE: CPT | Performed by: FAMILY MEDICINE

## 2020-02-18 PROCEDURE — 83036 HEMOGLOBIN GLYCOSYLATED A1C: CPT | Performed by: FAMILY MEDICINE

## 2020-02-18 NOTE — TELEPHONE ENCOUNTER
Prescription approved per [facility] Refill Protocol.  Need to keep upcoming appointment for further refills  Kelsey Garcia RN

## 2020-02-18 NOTE — TELEPHONE ENCOUNTER
Incruse    Pt has been receiving from mail order pharmacy. She is currently out of this medication, and would like to fill it at [facility] pharmacy.  She also states she has an appt next week.  If ok'd please send rx.    Thank you,  Madalyn Walker, [facility]  [phone]

## 2020-02-25 ENCOUNTER — OFFICE VISIT (OUTPATIENT)
Dept: FAMILY MEDICINE | Facility: CLINIC | Age: 75
End: 2020-02-25
Payer: COMMERCIAL

## 2020-02-25 VITALS
WEIGHT: 160 LBS | SYSTOLIC BLOOD PRESSURE: 139 MMHG | OXYGEN SATURATION: 95 % | TEMPERATURE: 97.7 F | DIASTOLIC BLOOD PRESSURE: 64 MMHG | RESPIRATION RATE: 18 BRPM | HEIGHT: 62 IN | HEART RATE: 59 BPM | BODY MASS INDEX: 29.44 KG/M2

## 2020-02-25 DIAGNOSIS — I10 HYPERTENSION GOAL BP (BLOOD PRESSURE) < 150/90: ICD-10-CM

## 2020-02-25 DIAGNOSIS — J43.9 PULMONARY EMPHYSEMA, UNSPECIFIED EMPHYSEMA TYPE (H): ICD-10-CM

## 2020-02-25 DIAGNOSIS — N18.30 CKD (CHRONIC KIDNEY DISEASE) STAGE 3, GFR 30-59 ML/MIN (H): ICD-10-CM

## 2020-02-25 DIAGNOSIS — I42.2 HYPERTENSIVE HYPERTROPHIC CARDIOMYOPATHY, WITHOUT HEART FAILURE (H): ICD-10-CM

## 2020-02-25 DIAGNOSIS — Z00.00 ENCOUNTER FOR MEDICARE ANNUAL WELLNESS EXAM: Primary | ICD-10-CM

## 2020-02-25 DIAGNOSIS — I11.9 HYPERTENSIVE HYPERTROPHIC CARDIOMYOPATHY, WITHOUT HEART FAILURE (H): ICD-10-CM

## 2020-02-25 DIAGNOSIS — R80.9 MICROALBUMINURIA: ICD-10-CM

## 2020-02-25 DIAGNOSIS — D58.2 ELEVATED HEMOGLOBIN (H): ICD-10-CM

## 2020-02-25 PROCEDURE — 99397 PER PM REEVAL EST PAT 65+ YR: CPT | Performed by: FAMILY MEDICINE

## 2020-02-25 PROCEDURE — 99213 OFFICE O/P EST LOW 20 MIN: CPT | Mod: 25 | Performed by: FAMILY MEDICINE

## 2020-02-25 RX ORDER — ATORVASTATIN CALCIUM 20 MG/1
20 TABLET, FILM COATED ORAL DAILY
Qty: 90 TABLET | Refills: 1 | Status: SHIPPED | OUTPATIENT
Start: 2020-02-25 | End: 2020-07-07

## 2020-02-25 RX ORDER — PREDNISONE 50 MG/1
50 TABLET ORAL DAILY
Qty: 5 TABLET | Refills: 0 | Status: SHIPPED | OUTPATIENT
Start: 2020-02-25 | End: 2020-08-19

## 2020-02-25 RX ORDER — ALBUTEROL SULFATE 90 UG/1
2 AEROSOL, METERED RESPIRATORY (INHALATION) EVERY 6 HOURS
Qty: 1 INHALER | Refills: 3 | Status: SHIPPED | OUTPATIENT
Start: 2020-02-25 | End: 2020-10-30

## 2020-02-25 RX ORDER — AMLODIPINE BESYLATE 5 MG/1
TABLET ORAL
Qty: 90 TABLET | Refills: 1 | Status: SHIPPED | OUTPATIENT
Start: 2020-02-25 | End: 2020-07-07

## 2020-02-25 RX ORDER — IRBESARTAN 300 MG/1
TABLET ORAL
Qty: 90 TABLET | Refills: 1 | Status: SHIPPED | OUTPATIENT
Start: 2020-02-25 | End: 2020-07-07

## 2020-02-25 RX ORDER — BENZONATATE 200 MG/1
200 CAPSULE ORAL 3 TIMES DAILY PRN
Qty: 90 CAPSULE | Refills: 3 | Status: SHIPPED | OUTPATIENT
Start: 2020-02-25 | End: 2021-02-22

## 2020-02-25 ASSESSMENT — ENCOUNTER SYMPTOMS
DIZZINESS: 0
CHILLS: 0
HEARTBURN: 0
WEAKNESS: 0
COUGH: 1
MYALGIAS: 0
EYE PAIN: 0
FEVER: 0
HEMATURIA: 0
CONSTIPATION: 0
ARTHRALGIAS: 0
NERVOUS/ANXIOUS: 0
DIARRHEA: 0
PARESTHESIAS: 0
BREAST MASS: 0
JOINT SWELLING: 0
SHORTNESS OF BREATH: 1
HEMATOCHEZIA: 0
PALPITATIONS: 0
NAUSEA: 0
DYSURIA: 0
HEADACHES: 1
FREQUENCY: 0
ABDOMINAL PAIN: 0
SORE THROAT: 0

## 2020-02-25 ASSESSMENT — MIFFLIN-ST. JEOR: SCORE: 1184.01

## 2020-02-25 ASSESSMENT — ACTIVITIES OF DAILY LIVING (ADL): CURRENT_FUNCTION: NO ASSISTANCE NEEDED

## 2020-02-25 NOTE — NURSING NOTE
"Chief Complaint   Patient presents with     Wellness Visit       Initial BP (!) 160/78   Pulse 59   Temp 97.7  F (36.5  C) (Oral)   Resp 18   Ht 1.575 m (5' 2\")   Wt 72.6 kg (160 lb)   SpO2 95%   BMI 29.26 kg/m   Estimated body mass index is 29.26 kg/m  as calculated from the following:    Height as of this encounter: 1.575 m (5' 2\").    Weight as of this encounter: 72.6 kg (160 lb).  Medication Reconciliation: complete  Low Nair CMA    "

## 2020-02-26 ENCOUNTER — TELEPHONE (OUTPATIENT)
Dept: FAMILY MEDICINE | Facility: CLINIC | Age: 75
End: 2020-02-26

## 2020-02-26 NOTE — TELEPHONE ENCOUNTER
Message: New rx for Benzonatate, 200mg cap. Patient reports Tartrazine allergy and there is a potential for cross-sensitivity. Please confirm if OK to fill this med.

## 2020-02-27 NOTE — TELEPHONE ENCOUNTER
Returned call to pharmacy and gave them providers message.   They will fill Rx.     Shyann Bean BSN, RN

## 2020-04-21 ENCOUNTER — TELEPHONE (OUTPATIENT)
Dept: NEPHROLOGY | Facility: CLINIC | Age: 75
End: 2020-04-21

## 2020-04-21 NOTE — TELEPHONE ENCOUNTER
Attempted to contact pt.  No answer.  unable to leave message no voicemail at home or cell number.    Calling pt regarding:   The situation surrounding COVID-19 (novel coronavirus) continues to evolve and changes occur rapidly. As a precautionary measure and to keep patients and team members safe, we are limiting non-essential visits to the clinic. We are giving patients the option to switch their in-clinic appointments to Video Visit.   Questioned if you would like to proceed with a Video Visit.    Will continue to try and reach pt.    Kathleen Perla LPN

## 2020-04-27 NOTE — TELEPHONE ENCOUNTER
2nd attempt to contact pt.  Not available.   answered.  Informed him to have pt call this clinic back for further information regarding her appt tomorrow.    Kathleen Perla LPN

## 2020-04-28 ENCOUNTER — VIRTUAL VISIT (OUTPATIENT)
Dept: NEPHROLOGY | Facility: CLINIC | Age: 75
End: 2020-04-28
Attending: FAMILY MEDICINE
Payer: COMMERCIAL

## 2020-04-28 DIAGNOSIS — I10 HYPERTENSION GOAL BP (BLOOD PRESSURE) < 140/90: ICD-10-CM

## 2020-04-28 DIAGNOSIS — R80.9 ALBUMINURIA: Primary | ICD-10-CM

## 2020-04-28 DIAGNOSIS — K21.9 GASTROESOPHAGEAL REFLUX DISEASE, ESOPHAGITIS PRESENCE NOT SPECIFIED: ICD-10-CM

## 2020-04-28 PROCEDURE — 99213 OFFICE O/P EST LOW 20 MIN: CPT | Mod: TEL | Performed by: INTERNAL MEDICINE

## 2020-04-28 NOTE — PATIENT INSTRUCTIONS
1. Lab and urine studies in the next month  2. Ultrasound in the next month  3. We will be in touch with next steps based on those results.   4. Goal blood pressure is less than 130/80 - please monitor at home and update if you find you are above this goal. [phone] (nephrology clinic)

## 2020-04-28 NOTE — TELEPHONE ENCOUNTER
Chart reviewed.  Pt spoke with Call Center Staff  Appt. chaged to Telephone Visit with Dr. Coe.    Kathleen Perla LPN

## 2020-04-28 NOTE — PROGRESS NOTES
"Kelsea Valentine is a 75 year old female who is being evaluated via a billable telephone visit.      The patient has been notified of following:     \"This telephone visit will be conducted via a call between you and your physician/provider. We have found that certain health care needs can be provided without the need for a physical exam.  This service lets us provide the care you need with a short phone conversation.  If a prescription is necessary we can send it directly to your pharmacy.  If lab work is needed we can place an order for that and you can then stop by our lab to have the test done at a later time.    Telephone visits are billed at different rates depending on your insurance coverage. During this emergency period, for some insurers they may be billed the same as an in-person visit.  Please reach out to your insurance provider with any questions.    If during the course of the call the physician/provider feels a telephone visit is not appropriate, you will not be charged for this service.\"    Patient has given verbal consent for Telephone visit?  Yes    How would you like to obtain your AVS? Mail a copy     Kathleen Perla LPN        April 28, 2020    I was asked to see this patient by Dr. Lopez for evaluation of albuminuria.     CC: albuminuria    HPI: Kelsea Valentine is a 75 year old female who presents for evaluation of albuminuria. Ms. 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 She previously underwent myectomy ~2010. LV function is normal. No hx of CAD. Her creatinine has been 0.9-1.2 dating back to 2009. Has had microalbuminuria dating back to 2011 but increased to 1071 mg/g on this most recent check in Feb. No hx of diabetes. She is on irbesartan 300 mg daily. Does not check blood pressure at home currently.     - History of Hematuria: no  - Swelling: Always has some swelinng in the summer - otherwise not an issue - not " an issue now  - Hx of UTIs: no  - Hx of stones: no  - Rashes/Joint pain: no rash, no joint pain  - Family hx of kidney disease: yes - father with kidney failure - passed at age 55 - had numerous heart attacks as well. She is not certain of the cause of his kidney disease - heavy tobacco and etoh use. He was on dialysis.   She has 2 brothers and a sister - lost 2 sisters: one from brain CA and the other related to heart concerns. She does not know of her siblings having kidney disease.   - NSAID use: uses tylenol        Allergies   Allergen Reactions     Lisinopril Swelling     Difficulty swallowing.     Ketorolac Swelling and Other (See Comments)     Eye drops     No Clinical Screening - See Comments Other (See Comments)     Diclofenac Rash     Around the left eye     Nickel Rash       albuterol (VENTOLIN HFA) 108 (90 Base) MCG/ACT inhaler, Inhale 2 puffs into the lungs every 6 hours  amLODIPine (NORVASC) 5 MG tablet, TAKE 1 TABLET EVERY DAY  atorvastatin (LIPITOR) 20 MG tablet, Take 1 tablet (20 mg) by mouth daily  benzonatate (TESSALON) 200 MG capsule, Take 1 capsule (200 mg) by mouth 3 times daily as needed for cough  CALCIUM 600/VITAMIN D OR, 1 everyday  Cholecalciferol (VITAMIN D) 1000 UNIT capsule, Take 1 capsule by mouth daily.  CLARITIN 10 MG OR TABS, 1 TABLET DAILY  FISH OIL OR, one everyday  GLUCOSAMINE 1500 COMPLEX OR, one everyday  irbesartan (AVAPRO) 300 MG tablet, TAKE 1 TABLET EVERY DAY  metoprolol succinate ER (TOPROL-XL) 100 MG 24 hr tablet, TAKE 1 TABLET EVERY DAY  omeprazole 20 MG tablet, Take 1 tablet by mouth daily. Take 30-60 minutes before a meal.  ONE-A-DAY WEIGHT SMART ADVANCE OR, 1 every other day  predniSONE (DELTASONE) 50 MG tablet, Take 1 tablet (50 mg) by mouth daily  TYLENOL CAPS 500 MG OR, 1 CAPSULE EVERY 4 HOURS AS NEEDED  umeclidinium (INCRUSE ELLIPTA) 62.5 MCG/INH inhaler, INHALE 1 PUFF INTO THE LUNGS DAILY  VITAMIN C OR, 1 everyday  estradiol (ESTRACE VAGINAL) 0.1 MG/GM cream, Use  2 grams vaginally every night for 2 weeks then use twice weekly for maintenancy (Patient not taking: Reported on 4/28/2020)    No current facility-administered medications on file prior to visit.       Past Medical History:   Diagnosis Date     Cardiomyopathy, hypertrophic obstructive (H)      CHF (congestive heart failure) (H)      High myopia, both eyes      HTN (hypertension)      Nonsenile cataract      Other primary cardiomyopathies      Pure hypercholesterolemia      Raynaud's syndrome        Past Surgical History:   Procedure Laterality Date     CATARACT IOL, RT/LT       CHOLECYSTECTOMY, OPEN       COLONOSCOPY N/A 7/27/2015    Procedure: COLONOSCOPY;  Surgeon: Dilip Quintero MD;  Location: [facility]     COLONOSCOPY WITH CO2 INSUFFLATION N/A 7/27/2015    Procedure: COLONOSCOPY WITH CO2 INSUFFLATION;  Surgeon: Dilip Quintero MD;  Location: [facility] OR     HC TOOTH EXTRACTION W/FORCEP      false teeth     NON-SURGICAL SEPTAL REDUCTION THERAPY W/ CORONARY ARTERIOGRAMS, W/WO TEMPORARY PACEMAKER       PHACOEMULSIFICATION WITH STANDARD INTRAOCULAR LENS IMPLANT  03/2017; 3/2017    left eye; right eye     SURGICAL HISTORY OF -   04/2010    mitral valvuloplasty [place]       Social History     Tobacco Use     Smoking status: Current Some Day Smoker     Packs/day: 1.00     Years: 51.00     Pack years: 51.00     Types: Cigarettes     Start date: 1/15/1968     Smokeless tobacco: Never Used     Tobacco comment:  smokes   Substance Use Topics     Alcohol use: No     Drug use: No       Family History   Problem Relation Age of Onset     Cancer Mother         stomach,bone     Alcohol/Drug Mother         smoker     C.A.D. Father      Cancer Father      Alcohol/Drug Father         smoker     Hypertension Father      Heart Disease Sister      Lipids Daughter      Heart Disease Sister      Breast Cancer Sister 64     Other Cancer Sister         bone , kidney     Brain Cancer Sister      Cerebrovascular Disease Brother       Cardiovascular Brother      Diabetes Brother      Hypertension Brother      Hypertension Brother      Cancer - colorectal Daughter 41     Thyroid Disease No family hx of      Glaucoma No family hx of      Macular Degeneration No family hx of        ROS: A 12 system review of systems was negative other than noted here or above.     Exam:  There were no vitals taken for this visit.  Tele[phone visit - no exam    Results:    No visits with results within 1 Day(s) from this visit.   Latest known visit with results is:   Orders Only on 02/18/2020   Component Date Value Ref Range Status     Sodium 02/18/2020 135  133 - 144 mmol/L Final     Potassium 02/18/2020 4.4  3.4 - 5.3 mmol/L Final     Chloride 02/18/2020 103  94 - 109 mmol/L Final     Carbon Dioxide 02/18/2020 25  20 - 32 mmol/L Final     Anion Gap 02/18/2020 7  3 - 14 mmol/L Final     Glucose 02/18/2020 117* 70 - 99 mg/dL Final    Fasting specimen     Urea Nitrogen 02/18/2020 18  7 - 30 mg/dL Final     Creatinine 02/18/2020 0.98  0.52 - 1.04 mg/dL Final     GFR Estimate 02/18/2020 58* >60 mL/min/[1.73_m2] Final    Comment: Non  GFR Calc  Starting 12/18/2018, serum creatinine based estimated GFR (eGFR) will be   calculated using the Chronic Kidney Disease Epidemiology Collaboration   (CKD-EPI) equation.       GFR Estimate If Black 02/18/2020 67  >60 mL/min/[1.73_m2] Final    Comment:  GFR Calc  Starting 12/18/2018, serum creatinine based estimated GFR (eGFR) will be   calculated using the Chronic Kidney Disease Epidemiology Collaboration   (CKD-EPI) equation.       Calcium 02/18/2020 9.5  8.5 - 10.1 mg/dL Final     Hemoglobin A1C 02/18/2020 6.3* 0 - 5.6 % Final    Comment: Normal <5.7% Prediabetes 5.7-6.4%  Diabetes 6.5% or higher - adopted from ADA   consensus guidelines.       Cholesterol 02/18/2020 170  <200 mg/dL Final     Triglycerides 02/18/2020 160* <150 mg/dL Final    Comment: Borderline high:  150-199 mg/dl  High:              200-499 mg/dl  Very high:       >499 mg/dl  Fasting specimen       HDL Cholesterol 02/18/2020 46* >49 mg/dL Final     LDL Cholesterol Calculated 02/18/2020 92  <100 mg/dL Final    Desirable:       <100 mg/dl     Non HDL Cholesterol 02/18/2020 124  <130 mg/dL Final     Creatinine Urine 02/18/2020 88  mg/dL Final     Albumin Urine mg/L 02/18/2020 941  mg/L Final     Albumin Urine mg/g Cr 02/18/2020 1,071.75* 0 - 25 mg/g Cr Final       Assessment/Plan:   1. Proteinuria: risk factors for kidney disease include hypertension and cardiac related events. Will reevaluate urine and kidney findings again next month along with getting an ultrasound. Educated on goal BP of less than 130/80 and to update If above this goal. She is already on 300 mg daily of irbesartan. We will determine next steps based on those lab results.     2. Hypertension: goal BP is less than 130/80 - educated ot monitor readings at home.     3. GERD: on PPI therapy - idealy would avoid given higher incidence of CKD with PPI therapy. Creatinine has been stable so seems unlikely that this is contributing to kidney injury at this time.     Patient Instructions   1. Lab and urine studies in the next month  2. Ultrasound in the next month  3. We will be in touch with next steps based on those results.   4. Goal blood pressure is less than 130/80 - please monitor at home and update if you find you are above this goal. [phone] (nephrology clinic)        Joshua Coe DO       Phone call duration: 14 minutes    Joshua Coe MD

## 2020-06-09 ENCOUNTER — ANCILLARY PROCEDURE (OUTPATIENT)
Dept: ULTRASOUND IMAGING | Facility: CLINIC | Age: 75
End: 2020-06-09
Attending: INTERNAL MEDICINE
Payer: COMMERCIAL

## 2020-06-09 DIAGNOSIS — R80.9 ALBUMINURIA: ICD-10-CM

## 2020-06-09 LAB
ALBUMIN SERPL-MCNC: 3.7 G/DL (ref 3.4–5)
ALBUMIN UR-MCNC: 10 MG/DL
ANION GAP SERPL CALCULATED.3IONS-SCNC: 5 MMOL/L (ref 3–14)
APPEARANCE UR: ABNORMAL
BACTERIA #/AREA URNS HPF: ABNORMAL /HPF
BILIRUB UR QL STRIP: NEGATIVE
BUN SERPL-MCNC: 22 MG/DL (ref 7–30)
CALCIUM SERPL-MCNC: 9.6 MG/DL (ref 8.5–10.1)
CHLORIDE SERPL-SCNC: 105 MMOL/L (ref 94–109)
CO2 SERPL-SCNC: 26 MMOL/L (ref 20–32)
COLOR UR AUTO: ABNORMAL
CREAT SERPL-MCNC: 1.16 MG/DL (ref 0.52–1.04)
CREAT UR-MCNC: 50 MG/DL
ERYTHROCYTE [DISTWIDTH] IN BLOOD BY AUTOMATED COUNT: 13.5 % (ref 10–15)
GFR SERPL CREATININE-BSD FRML MDRD: 47 ML/MIN/{1.73_M2}
GLUCOSE SERPL-MCNC: 138 MG/DL (ref 70–99)
GLUCOSE UR STRIP-MCNC: NEGATIVE MG/DL
HCT VFR BLD AUTO: 44.9 % (ref 35–47)
HGB BLD-MCNC: 15.1 G/DL (ref 11.7–15.7)
HGB UR QL STRIP: NEGATIVE
KETONES UR STRIP-MCNC: NEGATIVE MG/DL
LEUKOCYTE ESTERASE UR QL STRIP: NEGATIVE
MCH RBC QN AUTO: 32.1 PG (ref 26.5–33)
MCHC RBC AUTO-ENTMCNC: 33.6 G/DL (ref 31.5–36.5)
MCV RBC AUTO: 95 FL (ref 78–100)
MISCELLANEOUS TEST: NORMAL
MISCELLANEOUS TEST: NORMAL
NITRATE UR QL: NEGATIVE
NON-SQ EPI CELLS #/AREA URNS LPF: ABNORMAL /LPF
PH UR STRIP: 7 PH (ref 5–7)
PHOSPHATE SERPL-MCNC: 3.7 MG/DL (ref 2.5–4.5)
PLATELET # BLD AUTO: 287 10E9/L (ref 150–450)
POTASSIUM SERPL-SCNC: 4.3 MMOL/L (ref 3.4–5.3)
PROT UR-MCNC: 0.24 G/L
PROT/CREAT 24H UR: 0.48 G/G CR (ref 0–0.2)
PTH-INTACT SERPL-MCNC: 23 PG/ML (ref 18–80)
RBC # BLD AUTO: 4.71 10E12/L (ref 3.8–5.2)
RBC #/AREA URNS AUTO: ABNORMAL /HPF
RESULT: NORMAL
SEND OUTS MISC TEST CODE: NORMAL
SEND OUTS MISC TEST SPECIMEN: NORMAL
SODIUM SERPL-SCNC: 136 MMOL/L (ref 133–144)
SOURCE: ABNORMAL
SP GR UR STRIP: 1.01 (ref 1–1.03)
TEST NAME: NORMAL
UROBILINOGEN UR STRIP-MCNC: NORMAL MG/DL (ref 0–2)
WBC # BLD AUTO: 10.4 10E9/L (ref 4–11)
WBC #/AREA URNS AUTO: ABNORMAL /HPF

## 2020-06-09 PROCEDURE — 83970 ASSAY OF PARATHORMONE: CPT | Performed by: INTERNAL MEDICINE

## 2020-06-09 PROCEDURE — 80069 RENAL FUNCTION PANEL: CPT | Performed by: INTERNAL MEDICINE

## 2020-06-09 PROCEDURE — 86255 FLUORESCENT ANTIBODY SCREEN: CPT | Mod: 90 | Performed by: INTERNAL MEDICINE

## 2020-06-09 PROCEDURE — 85027 COMPLETE CBC AUTOMATED: CPT | Performed by: INTERNAL MEDICINE

## 2020-06-09 PROCEDURE — 99000 SPECIMEN HANDLING OFFICE-LAB: CPT | Performed by: INTERNAL MEDICINE

## 2020-06-09 PROCEDURE — 83520 IMMUNOASSAY QUANT NOS NONAB: CPT | Mod: 90 | Performed by: INTERNAL MEDICINE

## 2020-06-09 PROCEDURE — 36415 COLL VENOUS BLD VENIPUNCTURE: CPT | Performed by: INTERNAL MEDICINE

## 2020-06-09 PROCEDURE — 76770 US EXAM ABDO BACK WALL COMP: CPT

## 2020-06-09 PROCEDURE — 83883 ASSAY NEPHELOMETRY NOT SPEC: CPT | Performed by: INTERNAL MEDICINE

## 2020-06-09 PROCEDURE — 84156 ASSAY OF PROTEIN URINE: CPT | Performed by: INTERNAL MEDICINE

## 2020-06-09 PROCEDURE — 81001 URINALYSIS AUTO W/SCOPE: CPT | Performed by: INTERNAL MEDICINE

## 2020-06-10 LAB
KAPPA LC UR-MCNC: 3.95 MG/DL (ref 0.33–1.94)
KAPPA LC/LAMBDA SER: 1.61 {RATIO} (ref 0.26–1.65)
LAMBDA LC SERPL-MCNC: 2.45 MG/DL (ref 0.57–2.63)

## 2020-06-11 LAB
RESULT: NORMAL
SEND OUTS MISC TEST CODE: NORMAL
SEND OUTS MISC TEST SPECIMEN: NORMAL
TEST NAME: NORMAL

## 2020-06-13 DIAGNOSIS — I42.2 HYPERTENSIVE HYPERTROPHIC CARDIOMYOPATHY, WITHOUT HEART FAILURE (H): ICD-10-CM

## 2020-06-13 DIAGNOSIS — I10 HYPERTENSION GOAL BP (BLOOD PRESSURE) < 150/90: ICD-10-CM

## 2020-06-13 DIAGNOSIS — I11.9 HYPERTENSIVE HYPERTROPHIC CARDIOMYOPATHY, WITHOUT HEART FAILURE (H): ICD-10-CM

## 2020-06-15 RX ORDER — METOPROLOL SUCCINATE 100 MG/1
TABLET, EXTENDED RELEASE ORAL
Qty: 90 TABLET | Refills: 0 | Status: SHIPPED | OUTPATIENT
Start: 2020-06-15 | End: 2020-08-20

## 2020-06-25 ENCOUNTER — TELEPHONE (OUTPATIENT)
Dept: NEPHROLOGY | Facility: CLINIC | Age: 75
End: 2020-06-25

## 2020-06-25 DIAGNOSIS — N18.30 CKD (CHRONIC KIDNEY DISEASE) STAGE 3, GFR 30-59 ML/MIN (H): Primary | ICD-10-CM

## 2020-06-25 NOTE — TELEPHONE ENCOUNTER
Result note per Dr. Coe:  Ms. Valentine,     I apologize for the delay in commenting on your results. I will summarize the findings here:     Ultrasound was without abnormalities of the kidney.     - Your urine protein/creatinine ratio is currently 0.48 g/g (relatively low level)   - There is no blood present in the urine which is reassuring.   - I screened for some causes of protein in the urine and these came back reassuring (PLA2R Ab negative, myeloma screening negative)   - Your kidney function is up slightly from where it was 4 months ago but still similar to where you have been dating back to 2011.     My recommendation at this time:   - encourage tobacco cessation given this is a risk to the kidney   - good blood pressure control (goal less than 130/80)   - Avoid NSAIDs (ibuprofen, etc)   - continue to monitor this to assure no changes are occurring.     Summary for follow-up:   - recommend lab and follow-up in 3 months to assure stability and assure we have a plan on how to follow going forward.     Please call or MyChart with questions or concerns.     Sincerely,   Joshua Coe, DO     Team: please help in getting this follow-up arranged. At that time, typical CKD labs along with UA. Thank you, KW

## 2020-06-25 NOTE — TELEPHONE ENCOUNTER
[facility] Call Center    Phone Message    May a detailed message be left on voicemail: yes     Reason for Call: Other: Patient called regarding lab and US results. please advise     Action Taken: Message routed to:  Adult Clinics: Nephrology p [phone]

## 2020-06-26 NOTE — TELEPHONE ENCOUNTER
Spoke to patient. Results were reviewed and future appointments were scheduled. Patient had no further questions.     Suzanne Lopez RN, BSN  Nephrology Care Coordinator  [facility]

## 2020-08-11 NOTE — PROGRESS NOTES
Subjective     Kelsea Valentine is a 75 year old female who presents to clinic today for the following health issues:    HPI       ED/UC Followup:    Facility:  [facility]   Date of visit: 8/6/2020  Reason for visit: Urinary tract infection with hematuria, cough, weakness   Current Status: UTI sx have resolved.   Breathing difficulty and chest pain, which were onset of UTI sx has also resolved.       Feels back to baseline.  Finished antibiotics.  Back to work as /stocking shelves.        Patient Active Problem List   Diagnosis     Hyperlipidemia LDL goal <100     Hypertensive hypertrophic cardiomyopathy (H)     Mitral valve insufficiency     Hypertension goal BP (blood pressure) < 140/90     CKD (chronic kidney disease) stage 3, GFR 30-59 ml/min (H)     GERD (gastroesophageal reflux disease)     COPD (chronic obstructive pulmonary disease) (H)     Abnormal glucose     Advanced directives, counseling/discussion     Pseudophakia, ou     Benign neoplasm of colon, unspecified part of colon     Posterior vitreous detachment of both eyes     Elevated hemoglobin (H)     Past Surgical History:   Procedure Laterality Date     CATARACT IOL, RT/LT       CHOLECYSTECTOMY, OPEN       COLONOSCOPY N/A 7/27/2015    Procedure: COLONOSCOPY;  Surgeon: Dilip Quintero MD;  Location: [facility] OR     COLONOSCOPY WITH CO2 INSUFFLATION N/A 7/27/2015    Procedure: COLONOSCOPY WITH CO2 INSUFFLATION;  Surgeon: Dilip Quintero MD;  Location: [facility] OR     HC TOOTH EXTRACTION W/FORCEP      false teeth     NON-SURGICAL SEPTAL REDUCTION THERAPY W/ CORONARY ARTERIOGRAMS, W/WO TEMPORARY PACEMAKER       PHACOEMULSIFICATION WITH STANDARD INTRAOCULAR LENS IMPLANT  03/2017; 3/2017    left eye; right eye     SURGICAL HISTORY OF -   04/2010    mitral valvuloplasty [place]       Social History     Tobacco Use     Smoking status: Current Some Day Smoker     Packs/day: 1.00     Years: 51.00     Pack years: 51.00     Types: Cigarettes     Start date:  1/15/1968     Smokeless tobacco: Never Used     Tobacco comment:  smokes   Substance Use Topics     Alcohol use: No     Family History   Problem Relation Age of Onset     Cancer Mother         stomach,bone     Alcohol/Drug Mother         smoker     C.A.D. Father      Cancer Father      Alcohol/Drug Father         smoker     Hypertension Father      Heart Disease Sister      Lipids Daughter      Heart Disease Sister      Breast Cancer Sister 64     Other Cancer Sister         bone , kidney     Brain Cancer Sister      Cerebrovascular Disease Brother      Cardiovascular Brother      Diabetes Brother      Hypertension Brother      Hypertension Brother      Cancer - colorectal Daughter 41     Thyroid Disease No family hx of      Glaucoma No family hx of      Macular Degeneration No family hx of          Current Outpatient Medications   Medication Sig Dispense Refill     albuterol (VENTOLIN HFA) 108 (90 Base) MCG/ACT inhaler Inhale 2 puffs into the lungs every 6 hours 1 Inhaler 3     amLODIPine (NORVASC) 5 MG tablet TAKE 1 TABLET EVERY DAY 90 tablet 1     atorvastatin (LIPITOR) 20 MG tablet TAKE 1 TABLET EVERY DAY 90 tablet 1     benzonatate (TESSALON) 200 MG capsule Take 1 capsule (200 mg) by mouth 3 times daily as needed for cough 90 capsule 3     CALCIUM 600/VITAMIN D OR 1 everyday       Cholecalciferol (VITAMIN D) 1000 UNIT capsule Take 1 capsule by mouth daily.       CLARITIN 10 MG OR TABS 1 TABLET DAILY       FISH OIL OR one everyday       GLUCOSAMINE 1500 COMPLEX OR one everyday       irbesartan (AVAPRO) 300 MG tablet TAKE 1 TABLET EVERY DAY 90 tablet 1     metoprolol succinate ER (TOPROL-XL) 100 MG 24 hr tablet TAKE 1 TABLET EVERY DAY 90 tablet 0     omeprazole 20 MG tablet Take 1 tablet by mouth daily. Take 30-60 minutes before a meal. 30 tablet 1     ONE-A-DAY WEIGHT SMART ADVANCE OR 1 every other day       TYLENOL CAPS 500 MG OR 1 CAPSULE EVERY 4 HOURS AS NEEDED       umeclidinium (INCRUSE ELLIPTA) 62.5  "MCG/INH inhaler INHALE 1 PUFF INTO THE LUNGS DAILY 3 Inhaler 1     VITAMIN C OR 1 everyday       BP Readings from Last 3 Encounters:   08/19/20 120/74   02/25/20 139/64   08/13/19 132/66    Wt Readings from Last 3 Encounters:   08/19/20 74.5 kg (164 lb 3.2 oz)   02/25/20 72.6 kg (160 lb)   08/13/19 73.9 kg (163 lb)                    Reviewed and updated as needed this visit by Provider  Tobacco  Allergies  Meds  Problems  Med Hx  Surg Hx  Fam Hx         Review of Systems   Constitutional, HEENT, cardiovascular, pulmonary, gi and gu systems are negative, except as otherwise noted.      Objective    /74   Pulse 63   Temp 97.2  F (36.2  C) (Tympanic)   Resp 20   Wt 74.5 kg (164 lb 3.2 oz)   SpO2 97%   BMI 30.03 kg/m    Body mass index is 30.03 kg/m .  Physical Exam   GENERAL: healthy, alert and no distress  EYES: Eyes grossly normal to inspection, PERRL and conjunctivae and sclerae normal  RESP: lungs clear to auscultation - no rales, rhonchi or wheezes  CV: regular rate and rhythm, normal S1 S2, no S3 or S4, no murmur, click or rub, no peripheral edema and peripheral pulses strong  ABDOMEN: soft, nontender, no hepatosplenomegaly, no masses and bowel sounds normal  MS: no gross musculoskeletal defects noted, no edema  SKIN: no suspicious lesions or rashes  PSYCH: mentation appears normal, affect normal/bright    Diagnostic Test Results:  Labs reviewed in Epic        Assessment & Plan     (N39.0) Urinary tract infection without hematuria, site unspecified  (primary encounter diagnosis)  Comment: completed antibiotics  Plan: fully resolved    (J44.9) Chronic obstructive pulmonary disease, unspecified COPD type (H)  Comment: continue incruse daily and albuterol prn  Plan: no change to meds    (R73.03) Prediabetes  Comment: due  Plan: HEMOGLOBIN A1C                BMI:   Estimated body mass index is 30.03 kg/m  as calculated from the following:    Height as of 2/25/20: 1.575 m (5' 2\").    Weight as of " this encounter: 74.5 kg (164 lb 3.2 oz).           Patient Instructions       Continue all medications.            Return in about 6 months (around 2/19/2021) for Wellness Exam, Fasting Lab Work.    Maritza Lopez MD  [facility]

## 2020-08-19 ENCOUNTER — OFFICE VISIT (OUTPATIENT)
Dept: FAMILY MEDICINE | Facility: CLINIC | Age: 75
End: 2020-08-19
Payer: COMMERCIAL

## 2020-08-19 VITALS
BODY MASS INDEX: 30.03 KG/M2 | RESPIRATION RATE: 20 BRPM | HEART RATE: 63 BPM | DIASTOLIC BLOOD PRESSURE: 74 MMHG | WEIGHT: 164.2 LBS | TEMPERATURE: 97.2 F | OXYGEN SATURATION: 97 % | SYSTOLIC BLOOD PRESSURE: 120 MMHG

## 2020-08-19 DIAGNOSIS — R73.03 PREDIABETES: ICD-10-CM

## 2020-08-19 DIAGNOSIS — N39.0 URINARY TRACT INFECTION WITHOUT HEMATURIA, SITE UNSPECIFIED: Primary | ICD-10-CM

## 2020-08-19 DIAGNOSIS — J44.9 CHRONIC OBSTRUCTIVE PULMONARY DISEASE, UNSPECIFIED COPD TYPE (H): ICD-10-CM

## 2020-08-19 LAB — HBA1C MFR BLD: 6.3 % (ref 0–5.6)

## 2020-08-19 PROCEDURE — 99214 OFFICE O/P EST MOD 30 MIN: CPT | Performed by: FAMILY MEDICINE

## 2020-08-19 PROCEDURE — 83036 HEMOGLOBIN GLYCOSYLATED A1C: CPT | Performed by: FAMILY MEDICINE

## 2020-08-19 PROCEDURE — 36415 COLL VENOUS BLD VENIPUNCTURE: CPT | Performed by: FAMILY MEDICINE

## 2020-08-19 NOTE — LETTER
August 19, 2020      Kelsea Valentine  [address] [address]        To Whom It May Concern:    Kelsea Valentine  was seen on 08/19/20.  I recommend she discontinue working permanently.      Sincerely,        Maritza Lopez MD

## 2020-08-19 NOTE — LETTER
August 19, 2020      Kelsea Valentine  [address] [address] [id number]        Dear ,    We are writing to inform you of your test results.    Kelsea,     The A1C is unchanged, we will watch it every 6 months with your other labs.       Maritza Lopez MD / sarah    Resulted Orders   HEMOGLOBIN A1C   Result Value Ref Range    Hemoglobin A1C 6.3 (H) 0 - 5.6 %      Comment:      Normal <5.7% Prediabetes 5.7-6.4%  Diabetes 6.5% or higher - adopted from ADA   consensus guidelines.         If you have any questions or concerns, please call the clinic at the number listed above.       Sincerely,        Maritza Lopez MD

## 2020-08-19 NOTE — NURSING NOTE
"Chief Complaint   Patient presents with     ER F/U       Initial /74   Pulse 63   Temp 97.2  F (36.2  C) (Tympanic)   Resp 20   Wt 74.5 kg (164 lb 3.2 oz)   SpO2 97%   BMI 30.03 kg/m   Estimated body mass index is 30.03 kg/m  as calculated from the following:    Height as of 2/25/20: 1.575 m (5' 2\").    Weight as of this encounter: 74.5 kg (164 lb 3.2 oz).  Medication Reconciliation: complete  Low Nair CMA    "

## 2020-09-16 DIAGNOSIS — J43.9 PULMONARY EMPHYSEMA, UNSPECIFIED EMPHYSEMA TYPE (H): ICD-10-CM

## 2020-09-30 ENCOUNTER — TELEPHONE (OUTPATIENT)
Dept: NEPHROLOGY | Facility: CLINIC | Age: 75
End: 2020-09-30

## 2020-09-30 NOTE — TELEPHONE ENCOUNTER
9/30 Called and left voicemail. Explained we need to reschedule appointment on 10/5 to virtual video or telephone. Patient will also need to complete labs prior to virtual appointment.     Provided phone number [phone] to reschedule.     Chula Rodriguez   Procedure    Ortho/Sports Med/Pod/Ent/Eye[facility]Surgical Specialties  [facility] [place]   [phone]

## 2020-10-05 ENCOUNTER — VIRTUAL VISIT (OUTPATIENT)
Dept: NEPHROLOGY | Facility: CLINIC | Age: 75
End: 2020-10-05
Payer: COMMERCIAL

## 2020-10-05 DIAGNOSIS — I10 HYPERTENSION GOAL BP (BLOOD PRESSURE) < 140/90: ICD-10-CM

## 2020-10-05 DIAGNOSIS — N18.31 STAGE 3A CHRONIC KIDNEY DISEASE (H): ICD-10-CM

## 2020-10-05 DIAGNOSIS — R80.9 ALBUMINURIA: Primary | ICD-10-CM

## 2020-10-05 DIAGNOSIS — N18.30 CKD (CHRONIC KIDNEY DISEASE) STAGE 3, GFR 30-59 ML/MIN (H): ICD-10-CM

## 2020-10-05 LAB
ALBUMIN SERPL-MCNC: 3.7 G/DL (ref 3.4–5)
ALBUMIN UR-MCNC: 30 MG/DL
ANION GAP SERPL CALCULATED.3IONS-SCNC: 6 MMOL/L (ref 3–14)
APPEARANCE UR: CLEAR
BACTERIA #/AREA URNS HPF: ABNORMAL /HPF
BILIRUB UR QL STRIP: NEGATIVE
BUN SERPL-MCNC: 18 MG/DL (ref 7–30)
CALCIUM SERPL-MCNC: 9.4 MG/DL (ref 8.5–10.1)
CHLORIDE SERPL-SCNC: 101 MMOL/L (ref 94–109)
CO2 SERPL-SCNC: 27 MMOL/L (ref 20–32)
COLOR UR AUTO: ABNORMAL
CREAT SERPL-MCNC: 0.99 MG/DL (ref 0.52–1.04)
CREAT UR-MCNC: 58 MG/DL
CREAT UR-MCNC: 60 MG/DL
GFR SERPL CREATININE-BSD FRML MDRD: 56 ML/MIN/{1.73_M2}
GLUCOSE SERPL-MCNC: 107 MG/DL (ref 70–99)
GLUCOSE UR STRIP-MCNC: NEGATIVE MG/DL
HGB BLD-MCNC: 14.6 G/DL (ref 11.7–15.7)
HGB UR QL STRIP: NEGATIVE
KETONES UR STRIP-MCNC: NEGATIVE MG/DL
LEUKOCYTE ESTERASE UR QL STRIP: NEGATIVE
MICROALBUMIN UR-MCNC: 331 MG/L
MICROALBUMIN/CREAT UR: 551.67 MG/G CR (ref 0–25)
NITRATE UR QL: NEGATIVE
NON-SQ EPI CELLS #/AREA URNS LPF: ABNORMAL /LPF
PH UR STRIP: 7 PH (ref 5–7)
PHOSPHATE SERPL-MCNC: 3.6 MG/DL (ref 2.5–4.5)
POTASSIUM SERPL-SCNC: 4.6 MMOL/L (ref 3.4–5.3)
PROT UR-MCNC: 0.42 G/L
PROT/CREAT 24H UR: 0.72 G/G CR (ref 0–0.2)
PTH-INTACT SERPL-MCNC: 22 PG/ML (ref 18–80)
RBC #/AREA URNS AUTO: ABNORMAL /HPF
SODIUM SERPL-SCNC: 134 MMOL/L (ref 133–144)
SOURCE: ABNORMAL
SP GR UR STRIP: 1.01 (ref 1–1.03)
UROBILINOGEN UR STRIP-MCNC: NORMAL MG/DL (ref 0–2)
WBC #/AREA URNS AUTO: ABNORMAL /HPF

## 2020-10-05 PROCEDURE — 99214 OFFICE O/P EST MOD 30 MIN: CPT | Mod: GC | Performed by: INTERNAL MEDICINE

## 2020-10-05 PROCEDURE — 80069 RENAL FUNCTION PANEL: CPT | Performed by: INTERNAL MEDICINE

## 2020-10-05 PROCEDURE — 85018 HEMOGLOBIN: CPT | Performed by: INTERNAL MEDICINE

## 2020-10-05 PROCEDURE — 83970 ASSAY OF PARATHORMONE: CPT | Performed by: INTERNAL MEDICINE

## 2020-10-05 PROCEDURE — 82043 UR ALBUMIN QUANTITATIVE: CPT | Performed by: INTERNAL MEDICINE

## 2020-10-05 PROCEDURE — 81001 URINALYSIS AUTO W/SCOPE: CPT | Performed by: INTERNAL MEDICINE

## 2020-10-05 PROCEDURE — 84156 ASSAY OF PROTEIN URINE: CPT | Performed by: INTERNAL MEDICINE

## 2020-10-05 NOTE — PROGRESS NOTES
NEPHROLOGY FOLLOW UP  10/5/2020    Kelsea Valentine  MRN: [id number]  : 1945    CC: follow up of albuminuria    HPI: Kelsea Valentine is a 75 year old female who presents for follow up of albuminuria. She was last seen 2020, please see Dr. Coe's note from that time for further details.   Ms. 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   Since her last visit 2020, she reports no interim hospitalizations. However, she did have a UTI and was treated with Abx. She felt better after the Abx. Incidentally, she did have COVID testing which was negative.   Currently, no hematuria or dysuria. No fevers, no rashes. Minimal LE at the end of the work day, normal when she wakes up. No orthopnea or PND. She does not physically exert herself too much, she does have SOB with exertion related to COPD. However, with shopping, home activities, and work - no chest pain. She has reduced her tobacco use to less than 1 ppd.   She does check her BP at home - running 120-130/70-80.        Allergies   Allergen Reactions     Lisinopril Swelling     Difficulty swallowing.     Ketorolac Swelling and Other (See Comments)     Eye drops     No Clinical Screening - See Comments Other (See Comments)     Diclofenac Rash     Around the left eye     Nickel Rash            albuterol (VENTOLIN HFA) 108 (90 Base) MCG/ACT inhaler, Inhale 2 puffs into the lungs every 6 hours       amLODIPine (NORVASC) 5 MG tablet, TAKE 1 TABLET EVERY DAY       atorvastatin (LIPITOR) 20 MG tablet, TAKE 1 TABLET EVERY DAY       benzonatate (TESSALON) 200 MG capsule, Take 1 capsule (200 mg) by mouth 3 times daily as needed for cough       CALCIUM 600/VITAMIN D OR, 1 everyday       Cholecalciferol (VITAMIN D) 1000 UNIT capsule, Take 1 capsule by mouth daily.       CLARITIN 10 MG OR TABS, 1 TABLET DAILY       FISH OIL OR, one everyday       GLUCOSAMINE 1500 COMPLEX OR, one everyday        INCRUSE ELLIPTA 62.5 MCG/INH Inhaler, INHALE 1 PUFF INTO THE LUNGS DAILY       irbesartan (AVAPRO) 300 MG tablet, TAKE 1 TABLET EVERY DAY       metoprolol succinate ER (TOPROL-XL) 100 MG 24 hr tablet, TAKE 1 TABLET EVERY DAY       omeprazole 20 MG tablet, Take 1 tablet by mouth daily. Take 30-60 minutes before a meal.       ONE-A-DAY WEIGHT SMART ADVANCE OR, 1 every other day       TYLENOL CAPS 500 MG OR, 1 CAPSULE EVERY 4 HOURS AS NEEDED       VITAMIN C OR, 1 everyday    No current facility-administered medications on file prior to visit.       Past Medical History:   Diagnosis Date     Cardiomyopathy, hypertrophic obstructive (H)      CHF (congestive heart failure) (H)      COPD (chronic obstructive pulmonary disease) (H)      High myopia, both eyes      HTN (hypertension)      Nonsenile cataract      Other primary cardiomyopathies      Pure hypercholesterolemia      Raynaud's syndrome        Past Surgical History:   Procedure Laterality Date     CATARACT IOL, RT/LT       CHOLECYSTECTOMY, OPEN       COLONOSCOPY N/A 7/27/2015    Procedure: COLONOSCOPY;  Surgeon: Dilip Quintero MD;  Location: [facility] OR     COLONOSCOPY WITH CO2 INSUFFLATION N/A 7/27/2015    Procedure: COLONOSCOPY WITH CO2 INSUFFLATION;  Surgeon: Dilip Quintero MD;  Location: [facility] OR     HC TOOTH EXTRACTION W/FORCEP      false teeth     NON-SURGICAL SEPTAL REDUCTION THERAPY W/ CORONARY ARTERIOGRAMS, W/WO TEMPORARY PACEMAKER       PHACOEMULSIFICATION WITH STANDARD INTRAOCULAR LENS IMPLANT  03/2017; 3/2017    left eye; right eye     SURGICAL HISTORY OF -   04/2010    mitral valvuloplasty [place]       Social History     Tobacco Use     Smoking status: Current Some Day Smoker     Packs/day: 1.00     Years: 51.00     Pack years: 51.00     Types: Cigarettes     Start date: 1/15/1968     Smokeless tobacco: Never Used     Tobacco comment:  smokes   Substance Use Topics     Alcohol use: No     Drug use: No       Family History   Problem  Relation Age of Onset     Cancer Mother         stomach,bone     Alcohol/Drug Mother         smoker     C.A.D. Father      Cancer Father      Alcohol/Drug Father         smoker     Hypertension Father      Heart Disease Sister      Lipids Daughter      Heart Disease Sister      Breast Cancer Sister 64     Other Cancer Sister         bone , kidney     Brain Cancer Sister      Cerebrovascular Disease Brother      Cardiovascular Brother      Diabetes Brother      Hypertension Brother      Hypertension Brother      Cancer - colorectal Daughter 41     Thyroid Disease No family hx of      Glaucoma No family hx of      Macular Degeneration No family hx of        ROS: A 12 system review of systems was negative other than noted here or above.     Exam:  Telephone visit - no exam    Results:    Recent Results (from the past 240 hour(s))   Renal panel    Collection Time: 10/05/20  8:58 AM   Result Value Ref Range    Sodium 134 133 - 144 mmol/L    Potassium 4.6 3.4 - 5.3 mmol/L    Chloride 101 94 - 109 mmol/L    Carbon Dioxide 27 20 - 32 mmol/L    Anion Gap 6 3 - 14 mmol/L    Glucose 107 (H) 70 - 99 mg/dL    Urea Nitrogen 18 7 - 30 mg/dL    Creatinine 0.99 0.52 - 1.04 mg/dL    GFR Estimate 56 (L) >60 mL/min/[1.73_m2]    GFR Estimate If Black 65 >60 mL/min/[1.73_m2]    Calcium 9.4 8.5 - 10.1 mg/dL    Phosphorus 3.6 2.5 - 4.5 mg/dL    Albumin 3.7 3.4 - 5.0 g/dL   Hemoglobin    Collection Time: 10/05/20  8:58 AM   Result Value Ref Range    Hemoglobin 14.6 11.7 - 15.7 g/dL   UA reflex to Microscopic and Culture    Collection Time: 10/05/20  9:03 AM    Specimen: Midstream Urine   Result Value Ref Range    Color Urine Light Yellow     Appearance Urine Clear     Glucose Urine Negative NEG^Negative mg/dL    Bilirubin Urine Negative NEG^Negative    Ketones Urine Negative NEG^Negative mg/dL    Specific Gravity Urine 1.010 1.003 - 1.035    Blood Urine Negative NEG^Negative    pH Urine 7.0 5.0 - 7.0 pH    Protein Albumin Urine 30 (A)  "NEG^Negative mg/dL    Urobilinogen mg/dL Normal 0.0 - 2.0 mg/dL    Nitrite Urine Negative NEG^Negative    Leukocyte Esterase Urine Negative NEG^Negative    Source Midstream Urine    Urine Microscopic    Collection Time: 10/05/20  9:03 AM   Result Value Ref Range    WBC Urine 0 - 5 OTO5^0 - 5 /HPF    RBC Urine O - 2 OTO2^O - 2 /HPF    Bacteria Urine Few (A) NEG^Negative /HPF    Squamous Epithelial /LPF Urine Few FEW^Few /LPF       Assessment/Plan:   1. CKD 3 with Proteinuria: Await UPCR/UACR, most recent UACR was 1,071 mg/g 2/2020. Etiology likely related to long standing HTN, hyperglycemia, and smoking (PLA2r, SPEP neg). She does not have hematuria or pyuria to suggest proliferative, inflamm GN. However, if UPCR/UACR are further elevated, may need to consider further investigation (additional serology, biopsy).  Congratulated on risk reduction with cutting back on tobacco use. BP is at goal as well. Additionally, she is on anti-proteinuric therapy with irbesartan. Of note, may consider SGLT2i given her pre-diabetes and proteinuria.     2. Hypertension: goal BP is less than 130/80 - educated ot monitor readings at home.     3. GERD: on PPI therapy - reports GERD is well controlled on current dose. Creatinine has been stable so seems unlikely that this is contributing to kidney injury at this time.     Pt staffed with MD Gustabo Soto MD  Nephrology Fellow    -----------------      Kelsea Valentine is a 75 year old female who is being evaluated via a billable telephone visit.      The patient has been notified of following:     \"This telephone visit will be conducted via a call between you and your physician/provider. We have found that certain health care needs can be provided without the need for a physical exam.  This service lets us provide the care you need with a short phone conversation.  If a prescription is necessary we can send it directly to your pharmacy.  If lab work is needed " "we can place an order for that and you can then stop by our lab to have the test done at a later time.    Telephone visits are billed at different rates depending on your insurance coverage. During this emergency period, for some insurers they may be billed the same as an in-person visit.  Please reach out to your insurance provider with any questions.    If during the course of the call the physician/provider feels a telephone visit is not appropriate, you will not be charged for this service.\"    Patient has given verbal consent for Telephone visit?  Yes    What phone number would you like to be contacted at? [phone]    How would you like to obtain your AVS? Mail a copy    Marylin Wu CMA    Phone call duration: 22 minutes    Gustabo Apple MD    Attending Note: I have seen and examined this patient and the above note reflects my historical findings, exam findings, and assessment and plan.   Patient Instructions   1. Labs and follow-up in 6 months  2. Blood pressure goal is less than 130/80.      Joshua Coe, DO        "

## 2020-10-30 DIAGNOSIS — J43.9 PULMONARY EMPHYSEMA, UNSPECIFIED EMPHYSEMA TYPE (H): ICD-10-CM

## 2020-10-30 RX ORDER — ALBUTEROL SULFATE 90 UG/1
2 AEROSOL, METERED RESPIRATORY (INHALATION) EVERY 6 HOURS
Qty: 3 INHALER | Refills: 1 | Status: SHIPPED | OUTPATIENT
Start: 2020-10-30 | End: 2021-02-22

## 2021-02-09 NOTE — PROGRESS NOTES
"    Assessment & Plan     Pulmonary emphysema, unspecified emphysema type (H)  Stable on medications.   - albuterol (VENTOLIN HFA) 108 (90 Base) MCG/ACT inhaler; Inhale 2 puffs into the lungs every 6 hours  - benzonatate (TESSALON) 200 MG capsule; Take 1 capsule (200 mg) by mouth 3 times daily as needed for cough  - umeclidinium (INCRUSE ELLIPTA) 62.5 MCG/INH inhaler; INHALE 1 PUFF INTO THE LUNGS DAILY  - albuterol (PROAIR HFA/PROVENTIL HFA/VENTOLIN HFA) 108 (90 Base) MCG/ACT inhaler; Inhale 2 puffs into the lungs every 6 hours   Refill x 6 months continue all meds, follow-up 6 months for wellness exam    Hypertensive hypertrophic cardiomyopathy, without heart failure (H)  Hypertension goal BP (blood pressure) < 150/90  Stage 3a chronic kidney disease  Stable on medications.   - amLODIPine (NORVASC) 5 MG tablet; TAKE 1 TABLET EVERY DAY  - atorvastatin (LIPITOR) 20 MG tablet; TAKE 1 TABLET EVERY DAY  - irbesartan (AVAPRO) 300 MG tablet; TAKE 1 TABLET EVERY DAY  - metoprolol succinate ER (TOPROL-XL) 100 MG 24 hr tablet; TAKE 1 TABLET EVERY DAY,    Refill x 6 months continue all meds, follow-up 6 months for wellness exam                 Tobacco Cessation:   reports that she has been smoking cigarettes. She started smoking about 53 years ago. She has a 51.00 pack-year smoking history. She has never used smokeless tobacco.  Tobacco Cessation Action Plan: Information offered: Patient not interested at this time    BMI:   Estimated body mass index is 30.36 kg/m  as calculated from the following:    Height as of this encounter: 1.575 m (5' 2\").    Weight as of this encounter: 75.3 kg (166 lb).   Weight management plan: Discussed healthy diet and exercise guidelines    Return in about 6 months (around 8/22/2021).      Maritza Lopez MD  [facility]    Lino Enamorado is a 76 year old who presents for the following health issues.  HPI       Hyperlipidemia Follow-Up      Are you regularly taking any " medication or supplement to lower your cholesterol?   Yes- atorvastatin    Are you having muscle aches or other side effects that you think could be caused by your cholesterol lowering medication?  No    Hypertension Follow-up      Do you check your blood pressure regularly outside of the clinic? No     Are you following a low salt diet? Yes    Are your blood pressures ever more than 140 on the top number (systolic) OR more   than 90 on the bottom number (diastolic), for example 140/90? n/a      How many servings of fruits and vegetables do you eat daily?  2-3    On average, how many sweetened beverages do you drink each day? Drinks 2 diet cokes every day and 2 espressos with sugar once a day.       Hypertension ROS: Taking medications as instructed, no medication side effects noted, no TIA's, no chest pain on exertion, notes stable dyspnea on exertion, no change. Notes some swelling of ankles at work. Improves once she is at home.  Notes occasional headaches. She takes tylenol and says that it helps with her headaches. No visual changes.      How many days per week do you miss taking your medication? 0    Review of Systems   Constitutional, HEENT, cardiovascular, pulmonary, gi and gu systems are negative, except as otherwise noted.      Objective    There were no vitals taken for this visit.  There is no height or weight on file to calculate BMI.  Physical Exam   GENERAL: healthy, alert and no distress  NECK: no adenopathy, no asymmetry, masses, or scars and thyroid normal to palpation  RESP: lungs clear to auscultation - no rales, rhonchi or wheezes  CV: regular rate and rhythm, normal S1 S2, no S3 or S4, no murmur, click or rub, no peripheral edema and peripheral pulses strong  ABDOMEN: soft, nontender, no hepatosplenomegaly, no masses and bowel sounds normal  MS: no gross musculoskeletal defects noted, no edema

## 2021-02-15 ENCOUNTER — DOCUMENTATION ONLY (OUTPATIENT)
Dept: LAB | Facility: CLINIC | Age: 76
End: 2021-02-15

## 2021-02-15 DIAGNOSIS — I10 HYPERTENSION GOAL BP (BLOOD PRESSURE) < 140/90: ICD-10-CM

## 2021-02-15 DIAGNOSIS — R73.03 PREDIABETES: ICD-10-CM

## 2021-02-15 DIAGNOSIS — E78.5 HYPERLIPIDEMIA LDL GOAL <100: ICD-10-CM

## 2021-02-15 DIAGNOSIS — I10 HYPERTENSION GOAL BP (BLOOD PRESSURE) < 140/90: Primary | ICD-10-CM

## 2021-02-15 LAB
ANION GAP SERPL CALCULATED.3IONS-SCNC: 4 MMOL/L (ref 3–14)
BUN SERPL-MCNC: 22 MG/DL (ref 7–30)
CALCIUM SERPL-MCNC: 9.5 MG/DL (ref 8.5–10.1)
CHLORIDE SERPL-SCNC: 103 MMOL/L (ref 94–109)
CHOLEST SERPL-MCNC: 186 MG/DL
CO2 SERPL-SCNC: 30 MMOL/L (ref 20–32)
CREAT SERPL-MCNC: 1.11 MG/DL (ref 0.52–1.04)
GFR SERPL CREATININE-BSD FRML MDRD: 49 ML/MIN/{1.73_M2}
GLUCOSE SERPL-MCNC: 113 MG/DL (ref 70–99)
HBA1C MFR BLD: 6.4 % (ref 0–5.6)
HDLC SERPL-MCNC: 47 MG/DL
LDLC SERPL CALC-MCNC: 101 MG/DL
NONHDLC SERPL-MCNC: 139 MG/DL
POTASSIUM SERPL-SCNC: 4.7 MMOL/L (ref 3.4–5.3)
SODIUM SERPL-SCNC: 137 MMOL/L (ref 133–144)
TRIGL SERPL-MCNC: 191 MG/DL

## 2021-02-15 PROCEDURE — 36415 COLL VENOUS BLD VENIPUNCTURE: CPT | Performed by: FAMILY MEDICINE

## 2021-02-15 PROCEDURE — 80048 BASIC METABOLIC PNL TOTAL CA: CPT | Performed by: FAMILY MEDICINE

## 2021-02-15 PROCEDURE — 80061 LIPID PANEL: CPT | Performed by: FAMILY MEDICINE

## 2021-02-15 PROCEDURE — 83036 HEMOGLOBIN GLYCOSYLATED A1C: CPT | Performed by: FAMILY MEDICINE

## 2021-02-22 ENCOUNTER — OFFICE VISIT (OUTPATIENT)
Dept: FAMILY MEDICINE | Facility: CLINIC | Age: 76
End: 2021-02-22
Payer: COMMERCIAL

## 2021-02-22 VITALS
WEIGHT: 166 LBS | OXYGEN SATURATION: 93 % | HEART RATE: 61 BPM | SYSTOLIC BLOOD PRESSURE: 130 MMHG | TEMPERATURE: 98.1 F | BODY MASS INDEX: 30.55 KG/M2 | RESPIRATION RATE: 22 BRPM | DIASTOLIC BLOOD PRESSURE: 74 MMHG | HEIGHT: 62 IN

## 2021-02-22 DIAGNOSIS — I11.9 HYPERTENSIVE HYPERTROPHIC CARDIOMYOPATHY, WITHOUT HEART FAILURE (H): ICD-10-CM

## 2021-02-22 DIAGNOSIS — I10 HYPERTENSION GOAL BP (BLOOD PRESSURE) < 150/90: ICD-10-CM

## 2021-02-22 DIAGNOSIS — N18.31 STAGE 3A CHRONIC KIDNEY DISEASE (H): ICD-10-CM

## 2021-02-22 DIAGNOSIS — J43.9 PULMONARY EMPHYSEMA, UNSPECIFIED EMPHYSEMA TYPE (H): ICD-10-CM

## 2021-02-22 DIAGNOSIS — I42.2 HYPERTENSIVE HYPERTROPHIC CARDIOMYOPATHY, WITHOUT HEART FAILURE (H): ICD-10-CM

## 2021-02-22 PROCEDURE — 99214 OFFICE O/P EST MOD 30 MIN: CPT | Performed by: FAMILY MEDICINE

## 2021-02-22 RX ORDER — METOPROLOL SUCCINATE 100 MG/1
TABLET, EXTENDED RELEASE ORAL
Qty: 90 TABLET | Refills: 1 | Status: SHIPPED | OUTPATIENT
Start: 2021-02-22 | End: 2021-07-01

## 2021-02-22 RX ORDER — BENZONATATE 200 MG/1
200 CAPSULE ORAL 3 TIMES DAILY PRN
Qty: 90 CAPSULE | Refills: 3 | Status: SHIPPED | OUTPATIENT
Start: 2021-02-22 | End: 2022-09-06

## 2021-02-22 RX ORDER — ALBUTEROL SULFATE 90 UG/1
2 AEROSOL, METERED RESPIRATORY (INHALATION) EVERY 6 HOURS
Qty: 3 INHALER | Refills: 1 | Status: SHIPPED | OUTPATIENT
Start: 2021-02-22 | End: 2021-08-31

## 2021-02-22 RX ORDER — IRBESARTAN 300 MG/1
TABLET ORAL
Qty: 90 TABLET | Refills: 1 | Status: SHIPPED | OUTPATIENT
Start: 2021-02-22 | End: 2021-07-01

## 2021-02-22 RX ORDER — ALBUTEROL SULFATE 90 UG/1
2 AEROSOL, METERED RESPIRATORY (INHALATION) EVERY 6 HOURS
Qty: 18 G | Refills: 0 | Status: SHIPPED | OUTPATIENT
Start: 2021-02-22 | End: 2021-03-08

## 2021-02-22 RX ORDER — ATORVASTATIN CALCIUM 20 MG/1
TABLET, FILM COATED ORAL
Qty: 90 TABLET | Refills: 3 | Status: SHIPPED | OUTPATIENT
Start: 2021-02-22 | End: 2021-12-17

## 2021-02-22 RX ORDER — AMLODIPINE BESYLATE 5 MG/1
TABLET ORAL
Qty: 90 TABLET | Refills: 1 | Status: SHIPPED | OUTPATIENT
Start: 2021-02-22 | End: 2021-07-01

## 2021-02-22 ASSESSMENT — MIFFLIN-ST. JEOR: SCORE: 1206.22

## 2021-02-22 NOTE — NURSING NOTE
"Chief Complaint   Patient presents with     Lipids     Hypertension       Initial /74   Pulse 61   Temp 98.1  F (36.7  C) (Oral)   Resp 22   Ht 1.575 m (5' 2\")   Wt 75.3 kg (166 lb)   SpO2 93%   BMI 30.36 kg/m   Estimated body mass index is 30.36 kg/m  as calculated from the following:    Height as of this encounter: 1.575 m (5' 2\").    Weight as of this encounter: 75.3 kg (166 lb).  Medication Reconciliation: complete  Low Nair CMA    "

## 2021-03-08 ENCOUNTER — TELEPHONE (OUTPATIENT)
Dept: FAMILY MEDICINE | Facility: CLINIC | Age: 76
End: 2021-03-08

## 2021-03-08 DIAGNOSIS — J43.9 PULMONARY EMPHYSEMA, UNSPECIFIED EMPHYSEMA TYPE (H): ICD-10-CM

## 2021-03-08 RX ORDER — ALBUTEROL SULFATE 90 UG/1
2 AEROSOL, METERED RESPIRATORY (INHALATION) EVERY 6 HOURS
Qty: 18 G | Refills: 0 | Status: SHIPPED | OUTPATIENT
Start: 2021-03-08 | End: 2021-08-31

## 2021-03-08 NOTE — TELEPHONE ENCOUNTER
.Reason for call:  Other   Patient called regarding (reason for call): prescription  Additional comments: [place]olin [place]. [place] generic or albuterol (pv) or the generic albuterol (pa). Please write a new script for pharmacy for one of these scripts that insurance covers. Call [phone] if there are any concerns. Fax number: [phone]    Phone number to reach patient:  Home number on file [phone] (home)    Best Time:  anytime    Can we leave a detailed message on this number?  YES    Travel screening: Negative

## 2021-03-10 ENCOUNTER — IMMUNIZATION (OUTPATIENT)
Dept: PEDIATRICS | Facility: CLINIC | Age: 76
End: 2021-03-10
Payer: COMMERCIAL

## 2021-03-10 PROCEDURE — 0001A PR COVID VAC PFIZER DIL RECON 30 MCG/0.3 ML IM: CPT

## 2021-03-10 PROCEDURE — 91300 PR COVID VAC PFIZER DIL RECON 30 MCG/0.3 ML IM: CPT

## 2021-03-31 ENCOUNTER — IMMUNIZATION (OUTPATIENT)
Dept: PEDIATRICS | Facility: CLINIC | Age: 76
End: 2021-03-31
Attending: INTERNAL MEDICINE
Payer: COMMERCIAL

## 2021-03-31 PROCEDURE — 91300 PR COVID VAC PFIZER DIL RECON 30 MCG/0.3 ML IM: CPT

## 2021-03-31 PROCEDURE — 0002A PR COVID VAC PFIZER DIL RECON 30 MCG/0.3 ML IM: CPT

## 2021-06-30 DIAGNOSIS — I42.2 HYPERTENSIVE HYPERTROPHIC CARDIOMYOPATHY, WITHOUT HEART FAILURE (H): ICD-10-CM

## 2021-06-30 DIAGNOSIS — I11.9 HYPERTENSIVE HYPERTROPHIC CARDIOMYOPATHY, WITHOUT HEART FAILURE (H): ICD-10-CM

## 2021-06-30 DIAGNOSIS — I10 HYPERTENSION GOAL BP (BLOOD PRESSURE) < 150/90: ICD-10-CM

## 2021-06-30 DIAGNOSIS — J43.9 PULMONARY EMPHYSEMA, UNSPECIFIED EMPHYSEMA TYPE (H): ICD-10-CM

## 2021-06-30 NOTE — LETTER
July 1, 2021    Kelsea Valentine  [address] [address]    Dear Kelsea,       We recently received a refill request for medications.  We have refilled this for a one time 90 day supply only because you are due for a:    Wellness exam office visit and a FASTING lab appointment prior to further refills.      Please schedule this lab appointment 4-5 days prior to the office visit.     Please call at your earliest convenience so that there will not be a delay with your future refills.          Thank you,   Your [facility] Team/[facility] [place]  [phone]

## 2021-07-01 RX ORDER — METOPROLOL SUCCINATE 100 MG/1
TABLET, EXTENDED RELEASE ORAL
Qty: 90 TABLET | Refills: 0 | Status: SHIPPED | OUTPATIENT
Start: 2021-07-01 | End: 2021-08-31

## 2021-07-01 RX ORDER — AMLODIPINE BESYLATE 5 MG/1
TABLET ORAL
Qty: 90 TABLET | Refills: 0 | Status: SHIPPED | OUTPATIENT
Start: 2021-07-01 | End: 2021-08-31

## 2021-07-01 RX ORDER — IRBESARTAN 300 MG/1
TABLET ORAL
Qty: 90 TABLET | Refills: 0 | Status: SHIPPED | OUTPATIENT
Start: 2021-07-01 | End: 2021-08-31

## 2021-07-01 NOTE — TELEPHONE ENCOUNTER
Routing refill request to provider for review/approval because:  Labs out of range:  Cr  Stacia Veloz BSN, RN

## 2021-07-01 NOTE — TELEPHONE ENCOUNTER
Please contact pt to schedule next available wellness exam 90d supply sent of all requested meds, needs labs and wellness prior to next refill

## 2021-07-29 DIAGNOSIS — J43.9 PULMONARY EMPHYSEMA, UNSPECIFIED EMPHYSEMA TYPE (H): Primary | ICD-10-CM

## 2021-07-29 RX ORDER — ALBUTEROL SULFATE 90 UG/1
AEROSOL, METERED RESPIRATORY (INHALATION)
Qty: 17 G | Refills: 6 | Status: SHIPPED | OUTPATIENT
Start: 2021-07-29 | End: 2022-05-08

## 2021-08-18 NOTE — PATIENT INSTRUCTIONS
Patient Education   Personalized Prevention Plan  You are due for the preventive services outlined below.  Your care team is available to assist you in scheduling these services.  If you have already completed any of these items, please share that information with your care team to update in your medical record.  Health Maintenance Due   Topic Date Due     ANNUAL REVIEW OF HM ORDERS  Never done     A1C Lab  08/15/2021     Diptheria Tetanus Pertussis (DTAP/TDAP/TD) Vaccine (3 - Td or Tdap) 08/08/2021     Flu Vaccine (1) 09/01/2021     Mammogram  09/20/2021           Schedule follow-up with Dr. Coe  [phone].

## 2021-08-18 NOTE — PROGRESS NOTES
"tdSUBJECTIVE:   Kelsea Valentine is a 76 year old female who presents for Preventive Visit.      Patient has been advised of split billing requirements and indicates understanding: Yes   Are you in the first 12 months of your Medicare coverage?  No    Healthy Habits:     In general, how would you rate your overall health?  Fair    Frequency of exercise:  None    Do you usually eat at least 4 servings of fruit and vegetables a day, include whole grains    & fiber and avoid regularly eating high fat or \"junk\" foods?  No    Taking medications regularly:  Yes    Medication side effects:  None    Ability to successfully perform activities of daily living:  No assistance needed    Home Safety:  No safety concerns identified    Hearing Impairment:  No hearing concerns    In the past 6 months, have you been bothered by leaking of urine? Yes    In general, how would you rate your overall mental or emotional health?  Good      PHQ-2 Total Score: 0    Additional concerns today:  No    Do you feel safe in your environment? Yes    Have you ever done Advance Care Planning? (For example, a Health Directive, POLST, or a discussion with a medical provider or your loved ones about your wishes): declines information at today's visit        Fall risk  Fallen 2 or more times in the past year?: No  Any fall with injury in the past year?: No    Cognitive Screening   1) Repeat 3 items (Leader, Season, Table)    2) Clock draw: NORMAL  3) 3 item recall: Recalls 3 objects  Results: NORMAL clock, 1-2 items recalled: COGNITIVE IMPAIRMENT LESS LIKELY    Mini-CogTM Copyright STEPHANIE Cassidy. Licensed by the author for use in [facility]; reprinted with permission (prateek@.[facility]). All rights reserved.      Do you have sleep apnea, excessive snoring or daytime drowsiness?: no    Reviewed and updated as needed this visit by clinical staff  Tobacco  Allergies  Meds  Problems  Med Hx  Surg Hx  Fam Hx  Soc Hx          Reviewed and updated as " needed this visit by Provider  Tobacco  Allergies  Meds  Problems  Med Hx  Surg Hx  Fam Hx         Social History     Tobacco Use     Smoking status: Current Some Day Smoker     Packs/day: 1.00     Years: 51.00     Pack years: 51.00     Types: Cigarettes     Start date: 1/15/1968     Smokeless tobacco: Never Used     Tobacco comment:  smokes   Substance Use Topics     Alcohol use: No         Alcohol Use 2021   Prescreen: >3 drinks/day or >7 drinks/week? Not Applicable   Prescreen: >3 drinks/day or >7 drinks/week? -               Current providers sharing in care for this patient include:   Patient Care Team:  Maritza Lopez MD as PCP - General (Family Practice)  Maritza Lopez MD as Assigned PCP  Joshua Coe MD as Assigned Nephrology Provider    The following health maintenance items are reviewed in Epic and correct as of today:  Health Maintenance Due   Topic Date Due     ANNUAL REVIEW OF HM ORDERS  Never done     A1C  08/15/2021     DTAP/TDAP/TD IMMUNIZATION (3 - Td or Tdap) 2021     INFLUENZA VACCINE (1) 2021     MAMMO SCREENING  2021     G 1 P 1   No LMP recorded. Patient is postmenopausal.     Fasting: No   Td: tdap 2011 due       Flu: 2020      Covid: complted 3/31/2021      Shingrix: completed       PPV: completed      NO - age 65 - see link Cervical Cytology Screening Guidelines               Cholesterol:   Lab Results   Component Value Date    CHOL 187 2021    CHOL 186 02/15/2021     Lab Results   Component Value Date    HDL 49 2021    HDL 47 02/15/2021     Lab Results   Component Value Date     2021     02/15/2021     Lab Results   Component Value Date    TRIG 187 2021    TRIG 191 02/15/2021     Lab Results   Component Value Date    CHOLHDLRATIO 4.6 2015         MM2019  Dexa:  declined     Flex/colo: 2015 q2y declined      Seat Belt: Yes    Sunscreen use: Yes   Calcium Intake: adeq  Health  Care Directive: No  Sexually Active: Yes     Current contraception: none  History of abnormal Pap smear: Yes: see [facility]  Family history of colon/breast/ovarian cancer: Yes: see [facility]  Regular self breast exam: Yes  History of abnormal mammogram: No      Labs reviewed in EPIC  BP Readings from Last 3 Encounters:   08/31/21 124/64   02/22/21 130/74   08/19/20 120/74    Wt Readings from Last 3 Encounters:   08/31/21 78.4 kg (172 lb 12.8 oz)   02/22/21 75.3 kg (166 lb)   08/19/20 74.5 kg (164 lb 3.2 oz)                  Patient Active Problem List   Diagnosis     Hyperlipidemia LDL goal <100     Hypertensive hypertrophic cardiomyopathy (H)     Mitral valve insufficiency     Hypertension goal BP (blood pressure) < 140/90     CKD (chronic kidney disease) stage 3, GFR 30-59 ml/min     GERD (gastroesophageal reflux disease)     COPD (chronic obstructive pulmonary disease) (H)     Abnormal glucose     Advanced directives, counseling/discussion     Pseudophakia, ou     Benign neoplasm of colon, unspecified part of colon     Posterior vitreous detachment of both eyes     Past Surgical History:   Procedure Laterality Date     CATARACT IOL, RT/LT       CHOLECYSTECTOMY, OPEN       COLONOSCOPY N/A 7/27/2015    Procedure: COLONOSCOPY;  Surgeon: Dilip Quintero MD;  Location: [facility] OR     COLONOSCOPY WITH CO2 INSUFFLATION N/A 7/27/2015    Procedure: COLONOSCOPY WITH CO2 INSUFFLATION;  Surgeon: Dilip Quintero MD;  Location: [facility] OR     HC TOOTH EXTRACTION W/FORCEP      false teeth     NON-SURGICAL SEPTAL REDUCTION THERAPY W/ CORONARY ARTERIOGRAMS, W/WO TEMPORARY PACEMAKER       PHACOEMULSIFICATION WITH STANDARD INTRAOCULAR LENS IMPLANT  03/2017; 3/2017    left eye; right eye     SURGICAL HISTORY OF -   04/2010    mitral valvuloplasty [place]       Social History     Tobacco Use     Smoking status: Current Some Day Smoker     Packs/day: 1.00     Years: 51.00     Pack years: 51.00     Types: Cigarettes     Start date: 1/15/1968      Smokeless tobacco: Never Used     Tobacco comment:  smokes   Substance Use Topics     Alcohol use: No     Family History   Problem Relation Age of Onset     Cancer Mother         stomach,bone     Alcohol/Drug Mother         smoker     C.A.D. Father      Cancer Father      Alcohol/Drug Father         smoker     Hypertension Father      Heart Disease Sister      Lipids Daughter      Heart Disease Sister      Breast Cancer Sister 64     Other Cancer Sister         bone , kidney     Brain Cancer Sister      Cerebrovascular Disease Brother      Cardiovascular Brother      Diabetes Brother      Hypertension Brother      Hypertension Brother      Cancer - colorectal Daughter 41     Thyroid Disease No family hx of      Glaucoma No family hx of      Macular Degeneration No family hx of          Current Outpatient Medications   Medication Sig Dispense Refill     albuterol (PROAIR HFA/PROVENTIL HFA/VENTOLIN HFA) 108 (90 Base) MCG/ACT inhaler USE 2 INHALATIONS BY MOUTH  INTO THE LUNGS EVERY 6  HOURS 17 g 6     amLODIPine (NORVASC) 5 MG tablet TAKE 1 TABLET BY MOUTH  DAILY 90 tablet 1     atorvastatin (LIPITOR) 20 MG tablet TAKE 1 TABLET EVERY DAY 90 tablet 3     benzonatate (TESSALON) 200 MG capsule Take 1 capsule (200 mg) by mouth 3 times daily as needed for cough 90 capsule 3     CALCIUM 600/VITAMIN D OR 1 everyday       Cholecalciferol (VITAMIN D) 1000 UNIT capsule Take 1 capsule by mouth daily.       CLARITIN 10 MG OR TABS 1 TABLET DAILY       FISH OIL OR one everyday       GLUCOSAMINE 1500 COMPLEX OR one everyday       irbesartan (AVAPRO) 300 MG tablet TAKE 1 TABLET BY MOUTH  DAILY 90 tablet 1     metoprolol succinate ER (TOPROL-XL) 100 MG 24 hr tablet TAKE 1 TABLET BY MOUTH  DAILY 90 tablet 1     omeprazole 20 MG tablet Take 1 tablet by mouth daily. Take 30-60 minutes before a meal. 30 tablet 1     ONE-A-DAY WEIGHT SMART ADVANCE OR 1 every other day       TYLENOL CAPS 500 MG OR 1 CAPSULE EVERY 4 HOURS AS NEEDED  "      umeclidinium (INCRUSE ELLIPTA) 62.5 MCG/INH inhaler USE 1 INHALATION BY MOUTH  DAILY 3 each 1     VITAMIN C OR 1 everyday               Review of Systems   Constitutional: Negative for chills and fever.   HENT: Positive for congestion and hearing loss. Negative for ear pain and sore throat.    Eyes: Negative for pain and visual disturbance.   Respiratory: Positive for cough and shortness of breath.    Cardiovascular: Positive for peripheral edema. Negative for chest pain and palpitations.   Gastrointestinal: Negative for abdominal pain, constipation, diarrhea, heartburn, hematochezia and nausea.   Breasts:  Negative for tenderness, breast mass and discharge.   Genitourinary: Negative for dysuria, frequency, genital sores, hematuria, pelvic pain, urgency, vaginal bleeding and vaginal discharge.   Musculoskeletal: Negative for arthralgias, joint swelling and myalgias.   Skin: Negative for rash.   Neurological: Positive for headaches. Negative for dizziness, weakness and paresthesias.   Psychiatric/Behavioral: Negative for mood changes. The patient is not nervous/anxious.          OBJECTIVE:   /64   Pulse 61   Temp 97.7  F (36.5  C) (Oral)   Resp 20   Ht 1.575 m (5' 2\")   Wt 78.4 kg (172 lb 12.8 oz)   SpO2 91%   BMI 31.61 kg/m   Estimated body mass index is 31.61 kg/m  as calculated from the following:    Height as of this encounter: 1.575 m (5' 2\").    Weight as of this encounter: 78.4 kg (172 lb 12.8 oz).  Physical Exam  GENERAL APPEARANCE: healthy, alert and no distress  EYES: Eyes grossly normal to inspection, PERRL and conjunctivae and sclerae normal  HENT: ear canals and TM's normal, nose and mouth without ulcers or lesions, oropharynx clear and oral mucous membranes moist  NECK: no adenopathy, no asymmetry, masses, or scars and thyroid normal to palpation  RESP: lungs clear to auscultation - no rales, rhonchi or wheezes  BREAST: pt declines, will schedule mammogram  CV: regular rate and " rhythm, normal S1 S2, no S3 or S4, no murmur, click or rub, no peripheral edema and peripheral pulses strong  ABDOMEN: soft, nontender, no hepatosplenomegaly, no masses and bowel sounds normal  MS: no musculoskeletal defects are noted and gait is age appropriate without ataxia  SKIN: no suspicious lesions or rashes  NEURO: Normal strength and tone, sensory exam grossly normal, mentation intact and speech normal  PSYCH: mentation appears normal and affect normal/bright    Diagnostic Test Results:  Labs reviewed in Epic    ASSESSMENT / PLAN:   (Z00.00) Encounter for Medicare annual wellness exam  (primary encounter diagnosis)  Comment: preventive needs reviewed   Plan: see orders in Epic.     (J43.9) Pulmonary emphysema, unspecified emphysema type (H)  Comment: doing well on incruse, rare albuterol use  Plan: umeclidinium (INCRUSE ELLIPTA) 62.5 MCG/INH         inhaler, OFFICE/OUTPT VISIT,EST,LEVL IV         Refill x 6 months     (I10) Hypertension goal BP (blood pressure) < 140/90  (N18.31) Stage 3a chronic kidney disease  Comment: to goal bp, improved microalbumin  Plan: metoprolol succinate ER (TOPROL-XL) 100 MG 24         hr tablet, amLODIPine (NORVASC) 5 MG tablet,         OFFICE/OUTPT VISIT,EST,LEVL IV         Refill x 6 months f/u 6 months for OV and labs   Has not had follow-up as recommended by nephrology, pt will call to make appt.       (I11.9,  I42.2) Hypertensive hypertrophic cardiomyopathy, without heart failure (H)  Comment: stable, no symptoms  Plan: metoprolol succinate ER (TOPROL-XL) 100 MG 24         hr tablet, irbesartan (AVAPRO) 300 MG tablet,         amLODIPine (NORVASC) 5 MG tablet, OFFICE/OUTPT         VISIT,EST,LEVL IV         Refill x 6 months     (R73.03) Prediabetes  Comment: due for a1c  Plan: HEMOGLOBIN A1C, OFFICE/OUTPT VISIT,EST,LEVL IV        Obtain today, if elevated to 6.4 or more, plan to add sglt2-inhib to assist with renal protection    (D58.2) Elevated hemoglobin (H)  Comment:  "resolved  Plan: OFFICE/OUTPT VISIT,ZAHEER FIELDS IV        Resolve from prob list    (Z12.31) Visit for screening mammogram  Comment: due  Plan: *MA Screening Digital Bilateral              Patient has been advised of split billing requirements and indicates understanding: Yes  COUNSELING:  Reviewed preventive health counseling, as reflected in patient instructions  Special attention given to:       Regular exercise       Healthy diet/nutrition    Estimated body mass index is 31.61 kg/m  as calculated from the following:    Height as of this encounter: 1.575 m (5' 2\").    Weight as of this encounter: 78.4 kg (172 lb 12.8 oz).    Weight management plan: Discussed healthy diet and exercise guidelines    She reports that she has been smoking cigarettes. She started smoking about 53 years ago. She has a 51.00 pack-year smoking history. She has never used smokeless tobacco.  Tobacco Cessation Action Plan:   Information offered: Patient not interested at this time      Appropriate preventive services were discussed with this patient, including applicable screening as appropriate for cardiovascular disease, diabetes, osteopenia/osteoporosis, and glaucoma.  As appropriate for age/gender, discussed screening for colorectal cancer, prostate cancer, breast cancer, and cervical cancer. Checklist reviewing preventive services available has been given to the patient.    Reviewed patients plan of care and provided an AVS. The Intermediate Care Plan ( asthma action plan, low back pain action plan, and migraine action plan) for Kelsea meets the Care Plan requirement. This Care Plan has been established and reviewed with the Patient.    Counseling Resources:  ATP IV Guidelines  Pooled Cohorts Equation Calculator  Breast Cancer Risk Calculator  Breast Cancer: Medication to Reduce Risk  FRAX Risk Assessment  ICSI Preventive Guidelines  Dietary Guidelines for Americans, 2010  USDA's MyPlate  ASA Prophylaxis  Lung CA Screening    Maritza MARSHALL" MD John  [facility]    Identified Health Risks:

## 2021-08-20 ENCOUNTER — DOCUMENTATION ONLY (OUTPATIENT)
Dept: LAB | Facility: CLINIC | Age: 76
End: 2021-08-20

## 2021-08-20 DIAGNOSIS — N18.30 STAGE 3 CHRONIC KIDNEY DISEASE, UNSPECIFIED WHETHER STAGE 3A OR 3B CKD (H): ICD-10-CM

## 2021-08-20 DIAGNOSIS — K21.9 GASTROESOPHAGEAL REFLUX DISEASE, UNSPECIFIED WHETHER ESOPHAGITIS PRESENT: ICD-10-CM

## 2021-08-20 DIAGNOSIS — E78.5 HYPERLIPIDEMIA LDL GOAL <100: ICD-10-CM

## 2021-08-20 DIAGNOSIS — I10 HYPERTENSION GOAL BP (BLOOD PRESSURE) < 140/90: Primary | ICD-10-CM

## 2021-08-20 NOTE — PROGRESS NOTES
Kelsea Valentine has an upcoming lab appointment:    Future Appointments   Date Time Provider Department [place]   8/24/2021  8:45 AM [facility]   8/31/2021  7:35 AM [facility] Lopez [facility]     Patient is scheduled for the following lab(s): annual wellness labs    Patient either has no future order, or has Health Maintenance labs due. Please review and place either future orders or HMPO (Review of Health Maintenance Protocol Orders), as appropriate.    Health Maintenance Due   Topic     ANNUAL REVIEW OF HM ORDERS      A1C      BMP      Marylin Ivey

## 2021-08-24 ENCOUNTER — LAB (OUTPATIENT)
Dept: LAB | Facility: CLINIC | Age: 76
End: 2021-08-24
Payer: COMMERCIAL

## 2021-08-24 DIAGNOSIS — I10 HYPERTENSION GOAL BP (BLOOD PRESSURE) < 140/90: ICD-10-CM

## 2021-08-24 DIAGNOSIS — N18.31 STAGE 3A CHRONIC KIDNEY DISEASE (H): ICD-10-CM

## 2021-08-24 DIAGNOSIS — E78.5 HYPERLIPIDEMIA LDL GOAL <100: ICD-10-CM

## 2021-08-24 DIAGNOSIS — N18.30 STAGE 3 CHRONIC KIDNEY DISEASE, UNSPECIFIED WHETHER STAGE 3A OR 3B CKD (H): ICD-10-CM

## 2021-08-24 DIAGNOSIS — K21.9 GASTROESOPHAGEAL REFLUX DISEASE, UNSPECIFIED WHETHER ESOPHAGITIS PRESENT: ICD-10-CM

## 2021-08-24 DIAGNOSIS — R80.9 ALBUMINURIA: ICD-10-CM

## 2021-08-24 LAB
ALBUMIN SERPL-MCNC: 3.6 G/DL (ref 3.4–5)
ALP SERPL-CCNC: 69 U/L (ref 40–150)
ALT SERPL W P-5'-P-CCNC: 26 U/L (ref 0–50)
ANION GAP SERPL CALCULATED.3IONS-SCNC: 3 MMOL/L (ref 3–14)
AST SERPL W P-5'-P-CCNC: 17 U/L (ref 0–45)
BILIRUB SERPL-MCNC: 0.6 MG/DL (ref 0.2–1.3)
BUN SERPL-MCNC: 14 MG/DL (ref 7–30)
CALCIUM SERPL-MCNC: 8.9 MG/DL (ref 8.5–10.1)
CHLORIDE BLD-SCNC: 105 MMOL/L (ref 94–109)
CHOLEST SERPL-MCNC: 187 MG/DL
CO2 SERPL-SCNC: 28 MMOL/L (ref 20–32)
CREAT SERPL-MCNC: 1.07 MG/DL (ref 0.52–1.04)
CREAT UR-MCNC: 55 MG/DL
CREAT UR-MCNC: 56 MG/DL
FASTING STATUS PATIENT QL REPORTED: YES
GFR SERPL CREATININE-BSD FRML MDRD: 51 ML/MIN/1.73M2
GLUCOSE BLD-MCNC: 113 MG/DL (ref 70–99)
HDLC SERPL-MCNC: 49 MG/DL
HGB BLD-MCNC: 14.7 G/DL (ref 11.7–15.7)
LDLC SERPL CALC-MCNC: 101 MG/DL
MICROALBUMIN UR-MCNC: 212 MG/L
MICROALBUMIN/CREAT UR: 378.57 MG/G CR (ref 0–25)
NONHDLC SERPL-MCNC: 138 MG/DL
PHOSPHATE SERPL-MCNC: 3.6 MG/DL (ref 2.5–4.5)
POTASSIUM BLD-SCNC: 4.7 MMOL/L (ref 3.4–5.3)
PROT SERPL-MCNC: 7.1 G/DL (ref 6.8–8.8)
PROT UR-MCNC: 0.32 G/L
PROT/CREAT 24H UR: 0.58 G/G CR (ref 0–0.2)
PTH-INTACT SERPL-MCNC: 32 PG/ML (ref 18–80)
SODIUM SERPL-SCNC: 136 MMOL/L (ref 133–144)
TRIGL SERPL-MCNC: 187 MG/DL

## 2021-08-24 PROCEDURE — 80053 COMPREHEN METABOLIC PANEL: CPT

## 2021-08-24 PROCEDURE — 84100 ASSAY OF PHOSPHORUS: CPT

## 2021-08-24 PROCEDURE — 84156 ASSAY OF PROTEIN URINE: CPT

## 2021-08-24 PROCEDURE — 85018 HEMOGLOBIN: CPT

## 2021-08-24 PROCEDURE — 80061 LIPID PANEL: CPT

## 2021-08-24 PROCEDURE — 83970 ASSAY OF PARATHORMONE: CPT

## 2021-08-24 PROCEDURE — 36415 COLL VENOUS BLD VENIPUNCTURE: CPT

## 2021-08-24 PROCEDURE — 82043 UR ALBUMIN QUANTITATIVE: CPT

## 2021-08-24 NOTE — RESULT ENCOUNTER NOTE
I have reviewed the schedule of future appointments and this patient has an appointment scheduled with Thang 8-.    Visit date not found

## 2021-08-31 ENCOUNTER — OFFICE VISIT (OUTPATIENT)
Dept: FAMILY MEDICINE | Facility: CLINIC | Age: 76
End: 2021-08-31
Payer: COMMERCIAL

## 2021-08-31 VITALS
HEIGHT: 62 IN | WEIGHT: 172.8 LBS | DIASTOLIC BLOOD PRESSURE: 64 MMHG | HEART RATE: 61 BPM | OXYGEN SATURATION: 91 % | SYSTOLIC BLOOD PRESSURE: 124 MMHG | TEMPERATURE: 97.7 F | BODY MASS INDEX: 31.8 KG/M2 | RESPIRATION RATE: 20 BRPM

## 2021-08-31 DIAGNOSIS — J43.9 PULMONARY EMPHYSEMA, UNSPECIFIED EMPHYSEMA TYPE (H): ICD-10-CM

## 2021-08-31 DIAGNOSIS — D58.2 ELEVATED HEMOGLOBIN (H): ICD-10-CM

## 2021-08-31 DIAGNOSIS — N18.31 STAGE 3A CHRONIC KIDNEY DISEASE (H): ICD-10-CM

## 2021-08-31 DIAGNOSIS — I42.2 HYPERTENSIVE HYPERTROPHIC CARDIOMYOPATHY, WITHOUT HEART FAILURE (H): ICD-10-CM

## 2021-08-31 DIAGNOSIS — Z12.31 VISIT FOR SCREENING MAMMOGRAM: ICD-10-CM

## 2021-08-31 DIAGNOSIS — I10 HYPERTENSION GOAL BP (BLOOD PRESSURE) < 140/90: ICD-10-CM

## 2021-08-31 DIAGNOSIS — I11.9 HYPERTENSIVE HYPERTROPHIC CARDIOMYOPATHY, WITHOUT HEART FAILURE (H): ICD-10-CM

## 2021-08-31 DIAGNOSIS — R73.03 PREDIABETES: ICD-10-CM

## 2021-08-31 DIAGNOSIS — Z00.00 ENCOUNTER FOR MEDICARE ANNUAL WELLNESS EXAM: Primary | ICD-10-CM

## 2021-08-31 LAB — HBA1C MFR BLD: 6.3 % (ref 0–5.6)

## 2021-08-31 PROCEDURE — 83036 HEMOGLOBIN GLYCOSYLATED A1C: CPT | Performed by: FAMILY MEDICINE

## 2021-08-31 PROCEDURE — 90714 TD VACC NO PRESV 7 YRS+ IM: CPT | Performed by: FAMILY MEDICINE

## 2021-08-31 PROCEDURE — 90471 IMMUNIZATION ADMIN: CPT | Performed by: FAMILY MEDICINE

## 2021-08-31 PROCEDURE — 36416 COLLJ CAPILLARY BLOOD SPEC: CPT | Performed by: FAMILY MEDICINE

## 2021-08-31 PROCEDURE — 99397 PER PM REEVAL EST PAT 65+ YR: CPT | Mod: 25 | Performed by: FAMILY MEDICINE

## 2021-08-31 PROCEDURE — 99214 OFFICE O/P EST MOD 30 MIN: CPT | Mod: 25 | Performed by: FAMILY MEDICINE

## 2021-08-31 RX ORDER — AMLODIPINE BESYLATE 5 MG/1
TABLET ORAL
Qty: 90 TABLET | Refills: 1 | Status: SHIPPED | OUTPATIENT
Start: 2021-08-31 | End: 2022-03-06

## 2021-08-31 RX ORDER — METOPROLOL SUCCINATE 100 MG/1
TABLET, EXTENDED RELEASE ORAL
Qty: 90 TABLET | Refills: 1 | Status: SHIPPED | OUTPATIENT
Start: 2021-08-31 | End: 2022-03-06

## 2021-08-31 RX ORDER — IRBESARTAN 300 MG/1
TABLET ORAL
Qty: 90 TABLET | Refills: 1 | Status: SHIPPED | OUTPATIENT
Start: 2021-08-31 | End: 2022-03-06

## 2021-08-31 ASSESSMENT — ENCOUNTER SYMPTOMS
CONSTIPATION: 0
COUGH: 1
ARTHRALGIAS: 0
DYSURIA: 0
HEMATURIA: 0
WEAKNESS: 0
ABDOMINAL PAIN: 0
NAUSEA: 0
SHORTNESS OF BREATH: 1
MYALGIAS: 0
PALPITATIONS: 0
JOINT SWELLING: 0
PARESTHESIAS: 0
DIZZINESS: 0
HEARTBURN: 0
FREQUENCY: 0
FEVER: 0
HEADACHES: 1
BREAST MASS: 0
SORE THROAT: 0
HEMATOCHEZIA: 0
EYE PAIN: 0
DIARRHEA: 0
CHILLS: 0
NERVOUS/ANXIOUS: 0

## 2021-08-31 ASSESSMENT — ACTIVITIES OF DAILY LIVING (ADL): CURRENT_FUNCTION: NO ASSISTANCE NEEDED

## 2021-08-31 ASSESSMENT — PAIN SCALES - GENERAL: PAINLEVEL: NO PAIN (0)

## 2021-08-31 ASSESSMENT — MIFFLIN-ST. JEOR: SCORE: 1237.07

## 2021-08-31 NOTE — NURSING NOTE
"Chief Complaint   Patient presents with     Wellness Visit       Initial BP (!) 147/76   Pulse 61   Temp 97.7  F (36.5  C) (Oral)   Resp 20   Ht 1.575 m (5' 2\")   Wt 78.4 kg (172 lb 12.8 oz)   SpO2 91%   BMI 31.61 kg/m   Estimated body mass index is 31.61 kg/m  as calculated from the following:    Height as of this encounter: 1.575 m (5' 2\").    Weight as of this encounter: 78.4 kg (172 lb 12.8 oz).  Medication Reconciliation: complete  Low Nair CMA    "

## 2021-08-31 NOTE — LETTER
August 31, 2021      Kelsea Valentine  [address] [address]        Jd Enamorado,     Your A1C is stable, no change needed.     Resulted Orders   HEMOGLOBIN A1C   Result Value Ref Range    Hemoglobin A1C 6.3 (H) 0.0 - 5.6 %      Comment:      Normal <5.7%   Prediabetes 5.7-6.4%    Diabetes 6.5% or higher     Note: Adopted from ADA consensus guidelines.       If you have any questions or concerns, please call the clinic at the number listed above.       Sincerely,      Mairtza Lopez MD

## 2021-12-03 ENCOUNTER — TELEPHONE (OUTPATIENT)
Dept: NEPHROLOGY | Facility: CLINIC | Age: 76
End: 2021-12-03
Payer: COMMERCIAL

## 2021-12-03 DIAGNOSIS — N18.31 STAGE 3A CHRONIC KIDNEY DISEASE (H): Primary | ICD-10-CM

## 2021-12-03 NOTE — RESULT ENCOUNTER NOTE
12/03/21 letter sent.  Tel Enc started.  Spoke with Pt scheduled return appt with Dr. Coe.    Kathleen Perla LPN

## 2021-12-03 NOTE — TELEPHONE ENCOUNTER
Called and spoke with pt.  Assisted with scheduling a Return Appt with Dr. Coe.  Pt requested a Monday.  Scheduled 2/07/21 at 3:00pm labs at 2:30pm.    Encouraged pt to call if any further questions or concerns.      Kathleen Perla LPN

## 2021-12-16 DIAGNOSIS — I11.9 HYPERTENSIVE HYPERTROPHIC CARDIOMYOPATHY, WITHOUT HEART FAILURE (H): ICD-10-CM

## 2021-12-16 DIAGNOSIS — I42.2 HYPERTENSIVE HYPERTROPHIC CARDIOMYOPATHY, WITHOUT HEART FAILURE (H): ICD-10-CM

## 2021-12-17 RX ORDER — ATORVASTATIN CALCIUM 20 MG/1
TABLET, FILM COATED ORAL
Qty: 90 TABLET | Refills: 2 | Status: SHIPPED | OUTPATIENT
Start: 2021-12-17 | End: 2022-06-01

## 2022-01-04 ENCOUNTER — OFFICE VISIT (OUTPATIENT)
Dept: OPHTHALMOLOGY | Facility: CLINIC | Age: 77
End: 2022-01-04
Payer: COMMERCIAL

## 2022-01-04 DIAGNOSIS — Z01.01 ENCOUNTER FOR EXAMINATION OF EYES AND VISION WITH ABNORMAL FINDINGS: ICD-10-CM

## 2022-01-04 DIAGNOSIS — H43.813 POSTERIOR VITREOUS DETACHMENT OF BOTH EYES: ICD-10-CM

## 2022-01-04 DIAGNOSIS — Z96.1 PSEUDOPHAKIA: Primary | ICD-10-CM

## 2022-01-04 DIAGNOSIS — H26.491 POSTERIOR CAPSULAR OPACIFICATION VISUALLY SIGNIFICANT OF RIGHT EYE: ICD-10-CM

## 2022-01-04 DIAGNOSIS — H52.4 PRESBYOPIA: ICD-10-CM

## 2022-01-04 PROCEDURE — 66821 AFTER CATARACT LASER SURGERY: CPT | Mod: RT | Performed by: OPHTHALMOLOGY

## 2022-01-04 PROCEDURE — 92015 DETERMINE REFRACTIVE STATE: CPT | Performed by: OPHTHALMOLOGY

## 2022-01-04 PROCEDURE — 92004 COMPRE OPH EXAM NEW PT 1/>: CPT | Mod: 57 | Performed by: OPHTHALMOLOGY

## 2022-01-04 ASSESSMENT — REFRACTION_WEARINGRX
OD_AXIS: 023
OD_ADD: +2.92
OD_SPHERE: -2.00
OS_CYLINDER: +0.75
SPECS_TYPE: PAL
OS_ADD: 2.93
OS_AXIS: 180
OS_SPHERE: -0.75
OD_CYLINDER: +1.25

## 2022-01-04 ASSESSMENT — VISUAL ACUITY
OD_CC: 20/80
METHOD: SNELLEN - LINEAR
CORRECTION_TYPE: GLASSES
OS_CC+: -2
OS_CC: 20/30

## 2022-01-04 ASSESSMENT — EXTERNAL EXAM - LEFT EYE: OS_EXAM: PROLAPSED FAT PADS: UPPER, LOWER

## 2022-01-04 ASSESSMENT — REFRACTION_MANIFEST
OS_SPHERE: -0.75
OD_CYLINDER: SPHERE
OS_ADD: +3.00
OS_CYLINDER: +1.50
OD_SPHERE: -1.50
OD_ADD: +3.00
OS_AXIS: 007

## 2022-01-04 ASSESSMENT — CONF VISUAL FIELD
METHOD: COUNTING FINGERS
OS_NORMAL: 1
OD_NORMAL: 1

## 2022-01-04 ASSESSMENT — CUP TO DISC RATIO
OS_RATIO: 0.2
OD_RATIO: 0.5

## 2022-01-04 ASSESSMENT — TONOMETRY
OD_IOP_MMHG: 17
OS_IOP_MMHG: 16
IOP_METHOD: APPLANATION

## 2022-01-04 ASSESSMENT — EXTERNAL EXAM - RIGHT EYE: OD_EXAM: PROLAPSED FAT PADS: UPPER, LOWER

## 2022-01-04 NOTE — PATIENT INSTRUCTIONS
Your eye may be slightly red, sore, and blurry for a few days.  You may notice some floaters for a few days.  Keep scheduled return visit for a intraocular pressure check and refraction in about one week.    Davi Mosquera M.D.  [phone]

## 2022-01-04 NOTE — LETTER
1/4/2022         RE: Kelse[place] Valentine  [address] [address]        Dear Colleague,    Thank you for referring your patient, Kelsea Valentine, to the [facility]. Please see a copy of my visit note below.     Current Eye Medications:  None     Subjective:  Complete eye exam. Vision is little blurry right eye distance and near for last 1-3 months. Vision is doing well left eye. No eye pain or discomfort in either eye.      Objective:  See Ophthalmology Exam.       Assessment:  Visually significant posterior capsule opacity right eye; otherwise stable eye exam.      ICD-10-CM    1. Pseudophakia, ou  Z96.1    2. Posterior capsular opacification visually significant of right eye  H26.491    3. Posterior vitreous detachment of both eyes  H43.813    4. Encounter for examination of eyes and vision with abnormal findings  Z01.01    5. Presbyopia  H52.4 EYE EXAM (SIMPLE-NONBILLABLE)     REFRACTION        Plan: Yag Capsulotomy performed without problems right eye under Proparacaine anesthetic. Well tolerated.  Number of applications: 28  Power of applications: 1.9 mJ  Iopidine given.    Davi Mosquera M.D.                    Again, thank you for allowing me to participate in the care of your patient.        Sincerely,        Davi Mosquera MD

## 2022-01-04 NOTE — PROGRESS NOTES
Current Eye Medications:  None     Subjective:  Complete eye exam. Vision is little blurry right eye distance and near for last 1-3 months. Vision is doing well left eye. No eye pain or discomfort in either eye.      Objective:  See Ophthalmology Exam.       Assessment:  Visually significant posterior capsule opacity right eye; otherwise stable eye exam.      ICD-10-CM    1. Pseudophakia, ou  Z96.1    2. Posterior capsular opacification visually significant of right eye  H26.491    3. Posterior vitreous detachment of both eyes  H43.813    4. Encounter for examination of eyes and vision with abnormal findings  Z01.01    5. Presbyopia  H52.4 EYE EXAM (SIMPLE-NONBILLABLE)     REFRACTION        Plan: Yag Capsulotomy performed without problems right eye under Proparacaine anesthetic. Well tolerated.  Number of applications: 28  Power of applications: 1.9 mJ  Iopidine given.    Davi Mosquera M.D.

## 2022-01-11 ENCOUNTER — OFFICE VISIT (OUTPATIENT)
Dept: OPHTHALMOLOGY | Facility: CLINIC | Age: 77
End: 2022-01-11
Payer: COMMERCIAL

## 2022-01-11 DIAGNOSIS — Z96.1 PSEUDOPHAKIA: Primary | ICD-10-CM

## 2022-01-11 PROCEDURE — 99024 POSTOP FOLLOW-UP VISIT: CPT | Performed by: OPHTHALMOLOGY

## 2022-01-11 ASSESSMENT — REFRACTION_MANIFEST
OD_SPHERE: -2.50
OS_SPHERE: -0.75
OS_ADD: +3.00
OD_CYLINDER: +1.25
OD_ADD: +3.00
OS_AXIS: 007
OD_AXIS: 175
OS_CYLINDER: +1.50

## 2022-01-11 ASSESSMENT — VISUAL ACUITY
OS_CC: 20/20
METHOD: SNELLEN - LINEAR
CORRECTION_TYPE: GLASSES
OD_PH_CC: 20/25
OD_CC: 20/40
OS_CC+: -1
OD_PH_CC+: -1
OD_CC+: -2

## 2022-01-11 ASSESSMENT — REFRACTION_WEARINGRX
OS_AXIS: 180
OD_ADD: +2.92
OD_AXIS: 023
OS_ADD: 2.93
SPECS_TYPE: PAL
OD_SPHERE: -2.00
OS_CYLINDER: +0.75
OS_SPHERE: -0.75
OD_CYLINDER: +1.25

## 2022-01-11 ASSESSMENT — TONOMETRY
OD_IOP_MMHG: 18
IOP_METHOD: APPLANATION

## 2022-01-11 ASSESSMENT — EXTERNAL EXAM - LEFT EYE: OS_EXAM: PROLAPSED FAT PADS: UPPER, LOWER

## 2022-01-11 ASSESSMENT — EXTERNAL EXAM - RIGHT EYE: OD_EXAM: PROLAPSED FAT PADS: UPPER, LOWER

## 2022-01-11 NOTE — LETTER
1/11/2022         RE: Kelsea Valentine  [address] [address]        Dear Colleague,    Thank you for referring your patient, Kelsea Valentine, to the [facility]. Please see a copy of my visit note below.     Current Eye Medications:  none     Subjective:  1 wk Status Post Yag Capsulotomy right eye: patient states she is doing well, no problems after laser and she feels her vision is better.     Objective:  See Ophthalmology Exam.       Assessment:  Doing well s/p Yag Caps right eye.      Plan:  Glasses Rx given - optional   Possible clouding of posterior capsule left eye discussed.   Call in September 2022 for an appointment in January 2023 for Complete Exam    Dr. Mosquera [phone]            Again, thank you for allowing me to participate in the care of your patient.        Sincerely,        Davi Mosquera MD

## 2022-01-11 NOTE — PROGRESS NOTES
Current Eye Medications:  none     Subjective:  1 wk Status Post Yag Capsulotomy right eye: patient states she is doing well, no problems after laser and she feels her vision is better.     Objective:  See Ophthalmology Exam.       Assessment:  Doing well s/p Yag Caps right eye.      Plan:  Glasses Rx given - optional   Possible clouding of posterior capsule left eye discussed.   Call in September 2022 for an appointment in January 2023 for Complete Exam    Dr. Mosquera [phone]

## 2022-01-11 NOTE — PATIENT INSTRUCTIONS
Glasses Rx given - optional   Possible clouding of posterior capsule left eye discussed.   Call in September 2022 for an appointment in January 2023 for Complete Exam    Dr. Mosquera [phone]

## 2022-02-07 ENCOUNTER — OFFICE VISIT (OUTPATIENT)
Dept: NEPHROLOGY | Facility: CLINIC | Age: 77
End: 2022-02-07
Payer: COMMERCIAL

## 2022-02-07 ENCOUNTER — LAB (OUTPATIENT)
Dept: LAB | Facility: CLINIC | Age: 77
End: 2022-02-07

## 2022-02-07 VITALS
SYSTOLIC BLOOD PRESSURE: 159 MMHG | WEIGHT: 167 LBS | BODY MASS INDEX: 30.54 KG/M2 | DIASTOLIC BLOOD PRESSURE: 80 MMHG | OXYGEN SATURATION: 95 % | HEART RATE: 53 BPM

## 2022-02-07 DIAGNOSIS — N18.31 STAGE 3A CHRONIC KIDNEY DISEASE (H): ICD-10-CM

## 2022-02-07 DIAGNOSIS — I10 HYPERTENSION GOAL BP (BLOOD PRESSURE) < 140/90: ICD-10-CM

## 2022-02-07 DIAGNOSIS — R80.9 ALBUMINURIA: ICD-10-CM

## 2022-02-07 DIAGNOSIS — R80.9 ALBUMINURIA: Primary | ICD-10-CM

## 2022-02-07 DIAGNOSIS — K21.9 GASTROESOPHAGEAL REFLUX DISEASE WITHOUT ESOPHAGITIS: ICD-10-CM

## 2022-02-07 LAB
ALBUMIN SERPL-MCNC: 3.6 G/DL (ref 3.4–5)
ANION GAP SERPL CALCULATED.3IONS-SCNC: 3 MMOL/L (ref 3–14)
BUN SERPL-MCNC: 16 MG/DL (ref 7–30)
CALCIUM SERPL-MCNC: 9.1 MG/DL (ref 8.5–10.1)
CHLORIDE BLD-SCNC: 106 MMOL/L (ref 94–109)
CO2 SERPL-SCNC: 29 MMOL/L (ref 20–32)
CREAT SERPL-MCNC: 0.99 MG/DL (ref 0.52–1.04)
CREAT UR-MCNC: 66 MG/DL
GFR SERPL CREATININE-BSD FRML MDRD: 59 ML/MIN/1.73M2
GLUCOSE BLD-MCNC: 114 MG/DL (ref 70–99)
HGB BLD-MCNC: 14.1 G/DL (ref 11.7–15.7)
PHOSPHATE SERPL-MCNC: 3.5 MG/DL (ref 2.5–4.5)
POTASSIUM BLD-SCNC: 4.6 MMOL/L (ref 3.4–5.3)
PROT UR-MCNC: 0.44 G/L
PROT/CREAT 24H UR: 0.67 G/G CR (ref 0–0.2)
PTH-INTACT SERPL-MCNC: 42 PG/ML (ref 18–80)
SODIUM SERPL-SCNC: 138 MMOL/L (ref 133–144)

## 2022-02-07 PROCEDURE — 84156 ASSAY OF PROTEIN URINE: CPT

## 2022-02-07 PROCEDURE — 99214 OFFICE O/P EST MOD 30 MIN: CPT | Mod: 24 | Performed by: INTERNAL MEDICINE

## 2022-02-07 PROCEDURE — 83970 ASSAY OF PARATHORMONE: CPT

## 2022-02-07 PROCEDURE — 85018 HEMOGLOBIN: CPT

## 2022-02-07 PROCEDURE — 80069 RENAL FUNCTION PANEL: CPT

## 2022-02-07 PROCEDURE — 36415 COLL VENOUS BLD VENIPUNCTURE: CPT

## 2022-02-07 ASSESSMENT — PAIN SCALES - GENERAL: PAINLEVEL: NO PAIN (0)

## 2022-02-07 NOTE — PATIENT INSTRUCTIONS
1. Home blood pressure monitoring. Goal is less than 130/80. [phone] (Suzanne or Kathleen with nephrology).     2. We will look into our options regarding SGLT2 inhibitors and be in touch    3. Recommend labs in 6 months  4. Follow-up in one year  5. COVID booster is recommended at this time.

## 2022-02-07 NOTE — NURSING NOTE
Kelsea Valentine's chief complaint for this visit includes:  Chief Complaint   Patient presents with     RECHECK     one year recheck CKD     PCP: Maritza Lopez    Referring Provider:  No referring provider defined for this encounter.    BP (!) 159/80 (BP Location: Left arm, Patient Position: Sitting, Cuff Size: Adult Regular)   Pulse 53   Wt 75.8 kg (167 lb)   SpO2 95%   BMI 30.54 kg/m    No Pain (0)     Do you need any medication refills at today's visit?NO    Kathleen Perla LPN

## 2022-03-04 NOTE — PROGRESS NOTES
NEPHROLOGY FOLLOW UP  22    Kelsea Valentine  MRN: [id number]  : 1945    CC: follow up of albuminuria    HPI: Kelsea Valentine is a 77 year old female who presents for follow up of albuminuria. She was last seen 2020, please see Dr. Coe's note from that time for further details.   Ms. 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   Since her last visit 2020, she reports no interim hospitalizations. However, she did have a UTI and was treated with Abx. She felt better after the Abx. Incidentally, she did have COVID testing which was negative.   Currently, no hematuria or dysuria. No fevers, no rashes. Minimal LE at the end of the work day, normal when she wakes up. No orthopnea or PND. She does not physically exert herself too much, she does have SOB with exertion related to COPD. However, with shopping, home activities, and work - no chest pain. She has reduced her tobacco use to less than 1 ppd.   She does check her BP at home - running 120-130/70-80.     22: in person visit. We spent time discussing hte potential benefit of SGLT2 inhibitors. She has no hx of UTIs. No infection. She uses tylenol. She does use tobacco - <1 ppd. She is on irbesartan 300 mg daily. She has not followed home BP recently. Reviewd family hx:  - daughter with colon CA, hypertension; father with kidney failure, DM; sister with cancers, mother with cancers.        Allergies   Allergen Reactions     Lisinopril Swelling     Difficulty swallowing.     Ketorolac Swelling and Other (See Comments)     Eye drops     No Clinical Screening - See Comments Other (See Comments)     Diclofenac Rash     Around the left eye     Nickel Rash       albuterol (PROAIR HFA/PROVENTIL HFA/VENTOLIN HFA) 108 (90 Base) MCG/ACT inhaler, USE 2 INHALATIONS BY MOUTH  INTO THE LUNGS EVERY 6  HOURS  amLODIPine (NORVASC) 5 MG tablet, TAKE 1 TABLET BY MOUTH  DAILY  atorvastatin  (LIPITOR) 20 MG tablet, TAKE 1 TABLET BY MOUTH  DAILY  CALCIUM 600/VITAMIN D OR, 1 everyday  Cholecalciferol (VITAMIN D) 1000 UNIT capsule, Take 1 capsule by mouth daily.  CLARITIN 10 MG OR TABS, 1 TABLET DAILY  FISH OIL OR, one everyday  GLUCOSAMINE 1500 COMPLEX OR, one everyday  irbesartan (AVAPRO) 300 MG tablet, TAKE 1 TABLET BY MOUTH  DAILY  metoprolol succinate ER (TOPROL-XL) 100 MG 24 hr tablet, TAKE 1 TABLET BY MOUTH  DAILY  omeprazole 20 MG tablet, Take 1 tablet by mouth daily. Take 30-60 minutes before a meal.  ONE-A-DAY WEIGHT SMART ADVANCE OR, 1 every other day  TYLENOL CAPS 500 MG OR, 1 CAPSULE EVERY 4 HOURS AS NEEDED  umeclidinium (INCRUSE ELLIPTA) 62.5 MCG/INH inhaler, USE 1 INHALATION BY MOUTH  DAILY  VITAMIN C OR, 1 everyday  benzonatate (TESSALON) 200 MG capsule, Take 1 capsule (200 mg) by mouth 3 times daily as needed for cough (Patient not taking: Reported on 2/7/2022)    No current facility-administered medications on file prior to visit.    Exam:  Blood pressure (!) 159/80, pulse 53, weight 75.8 kg (167 lb), SpO2 95 %.       Results:    Lab on 02/07/2022   Component Date Value Ref Range Status     Parathyroid Hormone Intact 02/07/2022 42  18 - 80 pg/mL Final     Total Protein Random Urine g/L 02/07/2022 0.44  g/L Final    The reference range has not been established for total protein in random urine samples.  The result should be integrated into the clinical context for interpretation.     Total Protein Urine g/gr Creatinine 02/07/2022 0.67 (A) 0.00 - 0.20 g/g Cr Final     Creatinine Urine mg/dL 02/07/2022 66  mg/dL Final     Sodium 02/07/2022 138  133 - 144 mmol/L Final     Potassium 02/07/2022 4.6  3.4 - 5.3 mmol/L Final     Chloride 02/07/2022 106  94 - 109 mmol/L Final     Carbon Dioxide (CO2) 02/07/2022 29  20 - 32 mmol/L Final     Anion Gap 02/07/2022 3  3 - 14 mmol/L Final     Urea Nitrogen 02/07/2022 16  7 - 30 mg/dL Final     Creatinine 02/07/2022 0.99  0.52 - 1.04 mg/dL Final      Calcium 02/07/2022 9.1  8.5 - 10.1 mg/dL Final     Glucose 02/07/2022 114 (A) 70 - 99 mg/dL Final     Albumin 02/07/2022 3.6  3.4 - 5.0 g/dL Final     Phosphorus 02/07/2022 3.5  2.5 - 4.5 mg/dL Final     GFR Estimate 02/07/2022 59 (A) >60 mL/min/1.73m2 Final    Effective December 21, 2021 eGFRcr in adults is calculated using the 2021 CKD-EPI creatinine equation which includes age and gender (Phillip et al., NEJ, DOI: 10.1056/EKPZlb1388383)     Hemoglobin 02/07/2022 14.1  11.7 - 15.7 g/dL Final      Assessment/Plan:   1. CKD 3 with Proteinuria:. Etiology likely related to longstanding HTN, hyperglycemia, and smoking (PLA2r, SPEP neg). She does not have hematuria or pyuria to suggest proliferative, inflamm GN. However, if UPCR/UACR are further elevated, may need to consider further investigation (additional serology, biopsy).  Congratulated on risk reduction with cutting back on tobacco use. BP is at goal as well. Additionally, she is on anti-proteinuric therapy with irbesartan. Of note, may consider SGLT2i given her pre-diabetes and proteinuria.     2. Hypertension: goal BP is less than 130/80    3. GERD: on PPI therapy - reports GERD is well controlled on current dose. Creatinine has been stable so seems unlikely that this is contributing to kidney injury at this time.     4. Prevention: recommend COVID booster    Patient Instructions   1. Home blood pressure monitoring. Goal is less than 130/80. [phone] (Suzanne or Kathleen with nephrology).     2. We will look into our options regarding SGLT2 inhibitors and be in touch    3. Recommend labs in 6 months  4. Follow-up in one year  5. COVID booster is recommended at this time.        Joshua Coe,

## 2022-03-05 DIAGNOSIS — Z12.11 SPECIAL SCREENING FOR MALIGNANT NEOPLASMS, COLON: ICD-10-CM

## 2022-03-05 DIAGNOSIS — R73.03 PREDIABETES: Primary | ICD-10-CM

## 2022-03-05 DIAGNOSIS — D12.6 BENIGN NEOPLASM OF COLON, UNSPECIFIED PART OF COLON: ICD-10-CM

## 2022-03-05 DIAGNOSIS — I11.9 HYPERTENSIVE HYPERTROPHIC CARDIOMYOPATHY, WITHOUT HEART FAILURE (H): ICD-10-CM

## 2022-03-05 DIAGNOSIS — J43.9 PULMONARY EMPHYSEMA, UNSPECIFIED EMPHYSEMA TYPE (H): ICD-10-CM

## 2022-03-05 DIAGNOSIS — I42.2 HYPERTENSIVE HYPERTROPHIC CARDIOMYOPATHY, WITHOUT HEART FAILURE (H): ICD-10-CM

## 2022-03-05 DIAGNOSIS — I10 HYPERTENSION GOAL BP (BLOOD PRESSURE) < 140/90: ICD-10-CM

## 2022-03-05 NOTE — LETTER
March 7, 2022    Kelsea Gonzalezshanti  [address] [facility] [address]    Hi Kelsea,     I received a refill request for your blood pressure medications.  I have sent a 6 month supply to your pharmacy.  You are due for several things:   1. Non-fasting lab to check your A1C to be sure you don't have diabetes.  2.  You are due for a mammogram.  Please  call [phone] to schedule it.   3. You are due for the colonoscopy because of the polyps they found last time.  I have ordered the test, you should be called to schedule it.  4.  You need a blood pressure check, please be sure to follow up as Dr. Coe recommended last month.  You can make an appointment with our pharmacy or a nurse.     See you in August for your wellness exam.     Maritza Lopez MD

## 2022-03-06 RX ORDER — AMLODIPINE BESYLATE 5 MG/1
TABLET ORAL
Qty: 90 TABLET | Refills: 1 | Status: SHIPPED | OUTPATIENT
Start: 2022-03-06 | End: 2022-05-27

## 2022-03-06 RX ORDER — METOPROLOL SUCCINATE 100 MG/1
TABLET, EXTENDED RELEASE ORAL
Qty: 90 TABLET | Refills: 1 | Status: SHIPPED | OUTPATIENT
Start: 2022-03-06 | End: 2022-05-27

## 2022-03-06 RX ORDER — IRBESARTAN 300 MG/1
TABLET ORAL
Qty: 90 TABLET | Refills: 1 | Status: SHIPPED | OUTPATIENT
Start: 2022-03-06 | End: 2022-05-08 | Stop reason: DRUGHIGH

## 2022-03-06 NOTE — TELEPHONE ENCOUNTER
Please send letter.    Jd Fuentesyl,    I received a refill request for your blood pressure medications.  I have sent a 6 month supply to your pharmacy.  You are due for several things:   1. Non-fasting lab to check your A1C to be sure you don't have diabetes.  2.  You are due for a mammogram.  Please  call [phone] to schedule it.   3. You are due for the colonoscopy because of the polyps they found last time.  I have ordered the test, you should be called to schedule it.  4.  You need a blood pressure check, please be sure to follow up as Dr. Coe recommended last month.  You can make an appointment with our pharmacy or a nurse.    See you in August for your wellness exam.    Maritza Lopez MD

## 2022-03-07 ENCOUNTER — TELEPHONE (OUTPATIENT)
Dept: GASTROENTEROLOGY | Facility: CLINIC | Age: 77
End: 2022-03-07
Payer: COMMERCIAL

## 2022-04-18 ENCOUNTER — TELEPHONE (OUTPATIENT)
Dept: FAMILY MEDICINE | Facility: CLINIC | Age: 77
End: 2022-04-18
Payer: COMMERCIAL

## 2022-04-18 NOTE — TELEPHONE ENCOUNTER
Patient Quality Outreach    Patient is due for the following:   Diabetes -  A1C  Hypertension -  Hypertension follow-up visit  Colon Cancer Screening -  Colonoscopy  Breast Cancer Screening - Mammogram    NEXT STEPS:   Schedule a office visit for medication follow up, colonoscopy, mammogram    Type of outreach:    None.  Letter sent 3/8/2022.  Will reach out again in 2 months      Questions for provider review:    None     Low Hernandez, [facility]

## 2022-05-08 DIAGNOSIS — J43.9 PULMONARY EMPHYSEMA, UNSPECIFIED EMPHYSEMA TYPE (H): ICD-10-CM

## 2022-05-08 RX ORDER — IRBESARTAN AND HYDROCHLOROTHIAZIDE 300; 12.5 MG/1; MG/1
TABLET, FILM COATED ORAL
COMMUNITY
Start: 2022-04-27 | End: 2023-10-25 | Stop reason: SINTOL

## 2022-05-08 RX ORDER — ALBUTEROL SULFATE 90 UG/1
AEROSOL, METERED RESPIRATORY (INHALATION)
Qty: 34 G | Refills: 2 | Status: SHIPPED | OUTPATIENT
Start: 2022-05-08 | End: 2023-09-11

## 2022-05-27 PROBLEM — R73.03 PREDIABETES: Status: ACTIVE | Noted: 2022-05-27

## 2022-05-31 DIAGNOSIS — I42.2 HYPERTENSIVE HYPERTROPHIC CARDIOMYOPATHY, WITHOUT HEART FAILURE (H): ICD-10-CM

## 2022-05-31 DIAGNOSIS — I11.9 HYPERTENSIVE HYPERTROPHIC CARDIOMYOPATHY, WITHOUT HEART FAILURE (H): ICD-10-CM

## 2022-06-01 RX ORDER — ATORVASTATIN CALCIUM 20 MG/1
TABLET, FILM COATED ORAL
Qty: 90 TABLET | Refills: 0 | Status: SHIPPED | OUTPATIENT
Start: 2022-06-01 | End: 2022-08-03

## 2022-06-28 ENCOUNTER — DOCUMENTATION ONLY (OUTPATIENT)
Dept: NEPHROLOGY | Facility: CLINIC | Age: 77
End: 2022-06-28

## 2022-06-28 NOTE — PROGRESS NOTES
Farxiga and Jardiance are the preferred options and each have a copay of $47.00/30 days. Invokana and Steglatro both require a PA.     Thanks,     Darian Torres   Endocrinology Pharmacy Liaison   [phone] Phone   [phone] Fax   [email or web address]       Routing to Dr. Coe to advise if patient should proceed with medication and with medication orders. 2/7/22 GFR was 59 ml/min.    Suzanne Lopez RN, BSN  Nephrology Care Coordinator  [facility]

## 2022-08-04 DIAGNOSIS — I11.9 HYPERTENSIVE HYPERTROPHIC CARDIOMYOPATHY, WITHOUT HEART FAILURE (H): ICD-10-CM

## 2022-08-04 DIAGNOSIS — I10 HYPERTENSION GOAL BP (BLOOD PRESSURE) < 140/90: ICD-10-CM

## 2022-08-04 DIAGNOSIS — I42.2 HYPERTENSIVE HYPERTROPHIC CARDIOMYOPATHY, WITHOUT HEART FAILURE (H): ICD-10-CM

## 2022-08-05 RX ORDER — AMLODIPINE BESYLATE 5 MG/1
TABLET ORAL
Qty: 100 TABLET | Refills: 1 | Status: SHIPPED | OUTPATIENT
Start: 2022-08-05 | End: 2023-01-23

## 2022-08-09 DIAGNOSIS — J43.9 PULMONARY EMPHYSEMA, UNSPECIFIED EMPHYSEMA TYPE (H): ICD-10-CM

## 2022-08-29 ENCOUNTER — LAB (OUTPATIENT)
Dept: LAB | Facility: CLINIC | Age: 77
End: 2022-08-29
Payer: COMMERCIAL

## 2022-08-29 DIAGNOSIS — I10 HYPERTENSION GOAL BP (BLOOD PRESSURE) < 140/90: ICD-10-CM

## 2022-08-29 DIAGNOSIS — I11.9 HYPERTENSIVE HYPERTROPHIC CARDIOMYOPATHY, WITHOUT HEART FAILURE (H): ICD-10-CM

## 2022-08-29 DIAGNOSIS — R73.03 PREDIABETES: ICD-10-CM

## 2022-08-29 DIAGNOSIS — E78.5 HYPERLIPIDEMIA LDL GOAL <100: ICD-10-CM

## 2022-08-29 DIAGNOSIS — I42.2 HYPERTENSIVE HYPERTROPHIC CARDIOMYOPATHY, WITHOUT HEART FAILURE (H): ICD-10-CM

## 2022-08-29 LAB
ANION GAP SERPL CALCULATED.3IONS-SCNC: 4 MMOL/L (ref 3–14)
BUN SERPL-MCNC: 13 MG/DL (ref 7–30)
CALCIUM SERPL-MCNC: 9.3 MG/DL (ref 8.5–10.1)
CHLORIDE BLD-SCNC: 97 MMOL/L (ref 94–109)
CHOLEST SERPL-MCNC: 149 MG/DL
CO2 SERPL-SCNC: 29 MMOL/L (ref 20–32)
CREAT SERPL-MCNC: 1.02 MG/DL (ref 0.52–1.04)
FASTING STATUS PATIENT QL REPORTED: YES
GFR SERPL CREATININE-BSD FRML MDRD: 57 ML/MIN/1.73M2
GLUCOSE BLD-MCNC: 109 MG/DL (ref 70–99)
HBA1C MFR BLD: 6.3 % (ref 0–5.6)
HDLC SERPL-MCNC: 50 MG/DL
LDLC SERPL CALC-MCNC: 73 MG/DL
NONHDLC SERPL-MCNC: 99 MG/DL
POTASSIUM BLD-SCNC: 4.5 MMOL/L (ref 3.4–5.3)
SODIUM SERPL-SCNC: 130 MMOL/L (ref 133–144)
TRIGL SERPL-MCNC: 132 MG/DL

## 2022-08-29 PROCEDURE — 36415 COLL VENOUS BLD VENIPUNCTURE: CPT

## 2022-08-29 PROCEDURE — 83036 HEMOGLOBIN GLYCOSYLATED A1C: CPT

## 2022-08-29 PROCEDURE — 80061 LIPID PANEL: CPT

## 2022-08-29 PROCEDURE — 80048 BASIC METABOLIC PNL TOTAL CA: CPT

## 2022-08-30 RX ORDER — METOPROLOL SUCCINATE 100 MG/1
TABLET, EXTENDED RELEASE ORAL
Qty: 100 TABLET | Refills: 1 | Status: SHIPPED | OUTPATIENT
Start: 2022-08-30 | End: 2023-03-02

## 2022-09-06 ENCOUNTER — OFFICE VISIT (OUTPATIENT)
Dept: FAMILY MEDICINE | Facility: CLINIC | Age: 77
End: 2022-09-06
Payer: COMMERCIAL

## 2022-09-06 VITALS
HEIGHT: 62 IN | OXYGEN SATURATION: 94 % | RESPIRATION RATE: 16 BRPM | HEART RATE: 60 BPM | DIASTOLIC BLOOD PRESSURE: 66 MMHG | BODY MASS INDEX: 31.1 KG/M2 | TEMPERATURE: 97.9 F | WEIGHT: 169 LBS | SYSTOLIC BLOOD PRESSURE: 134 MMHG

## 2022-09-06 DIAGNOSIS — Z23 NEED FOR PROPHYLACTIC VACCINATION AND INOCULATION AGAINST INFLUENZA: ICD-10-CM

## 2022-09-06 DIAGNOSIS — Z00.00 ENCOUNTER FOR MEDICARE ANNUAL WELLNESS EXAM: Primary | ICD-10-CM

## 2022-09-06 DIAGNOSIS — N18.31 STAGE 3A CHRONIC KIDNEY DISEASE (H): ICD-10-CM

## 2022-09-06 DIAGNOSIS — I42.2 HYPERTENSIVE HYPERTROPHIC CARDIOMYOPATHY, WITHOUT HEART FAILURE (H): ICD-10-CM

## 2022-09-06 DIAGNOSIS — J43.9 PULMONARY EMPHYSEMA, UNSPECIFIED EMPHYSEMA TYPE (H): ICD-10-CM

## 2022-09-06 DIAGNOSIS — I11.9 HYPERTENSIVE HYPERTROPHIC CARDIOMYOPATHY, WITHOUT HEART FAILURE (H): ICD-10-CM

## 2022-09-06 DIAGNOSIS — E87.1 HYPONATREMIA: ICD-10-CM

## 2022-09-06 LAB
ANION GAP SERPL CALCULATED.3IONS-SCNC: 4 MMOL/L (ref 3–14)
BUN SERPL-MCNC: 18 MG/DL (ref 7–30)
CALCIUM SERPL-MCNC: 9.4 MG/DL (ref 8.5–10.1)
CHLORIDE BLD-SCNC: 98 MMOL/L (ref 94–109)
CO2 SERPL-SCNC: 30 MMOL/L (ref 20–32)
CREAT SERPL-MCNC: 1.1 MG/DL (ref 0.52–1.04)
GFR SERPL CREATININE-BSD FRML MDRD: 52 ML/MIN/1.73M2
GLUCOSE BLD-MCNC: 121 MG/DL (ref 70–99)
POTASSIUM BLD-SCNC: 4.7 MMOL/L (ref 3.4–5.3)
SODIUM SERPL-SCNC: 132 MMOL/L (ref 133–144)

## 2022-09-06 PROCEDURE — 90662 IIV NO PRSV INCREASED AG IM: CPT | Performed by: FAMILY MEDICINE

## 2022-09-06 PROCEDURE — G0439 PPPS, SUBSEQ VISIT: HCPCS | Performed by: FAMILY MEDICINE

## 2022-09-06 PROCEDURE — G0008 ADMIN INFLUENZA VIRUS VAC: HCPCS | Performed by: FAMILY MEDICINE

## 2022-09-06 PROCEDURE — 99214 OFFICE O/P EST MOD 30 MIN: CPT | Mod: 25 | Performed by: FAMILY MEDICINE

## 2022-09-06 PROCEDURE — 80048 BASIC METABOLIC PNL TOTAL CA: CPT | Performed by: FAMILY MEDICINE

## 2022-09-06 PROCEDURE — 36415 COLL VENOUS BLD VENIPUNCTURE: CPT | Performed by: FAMILY MEDICINE

## 2022-09-06 RX ORDER — BENZONATATE 200 MG/1
200 CAPSULE ORAL 3 TIMES DAILY PRN
Qty: 90 CAPSULE | Refills: 3 | Status: SHIPPED | OUTPATIENT
Start: 2022-09-06 | End: 2023-05-08

## 2022-09-06 RX ORDER — ATORVASTATIN CALCIUM 20 MG/1
20 TABLET, FILM COATED ORAL DAILY
Qty: 90 TABLET | Refills: 3 | Status: SHIPPED | OUTPATIENT
Start: 2022-09-06 | End: 2023-09-11

## 2022-09-06 ASSESSMENT — ENCOUNTER SYMPTOMS
PARESTHESIAS: 0
ARTHRALGIAS: 0
HEADACHES: 0
DIZZINESS: 0
HEARTBURN: 0
CONSTIPATION: 0
PALPITATIONS: 0
COUGH: 0
FEVER: 0
HEMATOCHEZIA: 0
NERVOUS/ANXIOUS: 0
HEMATURIA: 0
DIARRHEA: 0
ABDOMINAL PAIN: 0
NAUSEA: 0
DYSURIA: 0
FREQUENCY: 0
MYALGIAS: 0
SORE THROAT: 0
CHILLS: 0
JOINT SWELLING: 0
EYE PAIN: 0
SHORTNESS OF BREATH: 0
WEAKNESS: 0

## 2022-09-06 ASSESSMENT — PAIN SCALES - GENERAL: PAINLEVEL: NO PAIN (0)

## 2022-09-06 ASSESSMENT — ACTIVITIES OF DAILY LIVING (ADL): CURRENT_FUNCTION: NO ASSISTANCE NEEDED

## 2022-09-06 NOTE — PATIENT INSTRUCTIONS
Patient Education   Personalized Prevention Plan  You are due for the preventive services outlined below.  Your care team is available to assist you in scheduling these services.  If you have already completed any of these items, please share that information with your care team to update in your medical record.  Health Maintenance Due   Topic Date Due    ANNUAL REVIEW OF  ORDERS  Never done    Colorectal Cancer Screening  07/27/2017    COVID-19 Vaccine (4 - Booster for Pfizer series) 06/09/2022    Flu Vaccine (1) 09/01/2022          Continue all medications

## 2022-09-06 NOTE — LETTER
September 8, 2022      Kelsea Valentine  [address]   [place] [address]        Kelsea,  Your sodium is improved.  Maritza Lopez MD     Resulted Orders   Basic metabolic panel  (Ca, Cl, CO2, Creat, Gluc, K, Na, BUN)   Result Value Ref Range    Sodium 132 (L) 133 - 144 mmol/L    Potassium 4.7 3.4 - 5.3 mmol/L    Chloride 98 94 - 109 mmol/L    Carbon Dioxide (CO2) 30 20 - 32 mmol/L    Anion Gap 4 3 - 14 mmol/L    Urea Nitrogen 18 7 - 30 mg/dL    Creatinine 1.10 (H) 0.52 - 1.04 mg/dL    Calcium 9.4 8.5 - 10.1 mg/dL    Glucose 121 (H) 70 - 99 mg/dL    GFR Estimate 52 (L) >60 mL/min/1.73m2      Comment:      Effective December 21, 2021 eGFRcr in adults is calculated using the 2021 CKD-EPI creatinine equation which includes age and gender (Phillip hanley al., NEJM, DOI: 10.1056/BXYJmv5613636)

## 2022-09-06 NOTE — PROGRESS NOTES
"SUBJECTIVE:   Kelsea Valentine is a 77 year old female who presents for Preventive Visit.      Patient has been advised of split billing requirements and indicates understanding: Yes  Are you in the first 12 months of your Medicare coverage?  No    Healthy Habits:     In general, how would you rate your overall health?  Good    Frequency of exercise:  None    Do you usually eat at least 4 servings of fruit and vegetables a day, include whole grains    & fiber and avoid regularly eating high fat or \"junk\" foods?  No    Taking medications regularly:  Yes    Medication side effects:  None    Ability to successfully perform activities of daily living:  No assistance needed    Home Safety:  No safety concerns identified    Hearing Impairment:  No hearing concerns    In the past 6 months, have you been bothered by leaking of urine?  No    In general, how would you rate your overall mental or emotional health?  Good      PHQ-2 Total Score: 0    Additional concerns today:  No    Do you feel safe in your environment? Yes    Have you ever done Advance Care Planning? (For example, a Health Directive, POLST, or a discussion with a medical provider or your loved ones about your wishes): No, advance care planning information given to patient to review.  Patient declined advance care planning discussion at this time.       Fall risk  Fallen 2 or more times in the past year?: No  Any fall with injury in the past year?: No    Cognitive Screening   1) Repeat 3 items (Leader, Season, Table)    2) Clock draw: NORMAL  3) 3 item recall: Recalls 3 objects  Results: 3 items recalled: COGNITIVE IMPAIRMENT LESS LIKELY    Mini-CogTM Copyright STEPHANIE Cassidy. Licensed by the author for use in [facility]; reprinted with permission ([email or web address]). All rights reserved.      Do you have sleep apnea, excessive snoring or daytime drowsiness?: yes    Reviewed and updated as needed this visit by clinical staff   Tobacco  Allergies  Meds  " Problems  Med Hx  Surg Hx  Fam Hx  Soc   Hx          Reviewed and updated as needed this visit by Provider   Tobacco  Allergies  Meds  Problems  Med Hx  Surg Hx  Fam Hx           Social History     Tobacco Use     Smoking status: Current Some Day Smoker     Packs/day: 1.00     Years: 51.00     Pack years: 51.00     Types: Cigarettes     Start date: 1/15/1968     Smokeless tobacco: Never Used     Tobacco comment:  smokes   Substance Use Topics     Alcohol use: No     If you drink alcohol do you typically have >3 drinks per day or >7 drinks per week? Not applicable    Alcohol Use 9/6/2022   Prescreen: >3 drinks/day or >7 drinks/week? No   Prescreen: >3 drinks/day or >7 drinks/week? -               Current providers sharing in care for this patient include:   Patient Care Team:  Maritza Lopez MD as PCP - General (Family Practice)  Maritza Lopez MD as Assigned PCP  Joshua Coe DO as Assigned Nephrology Provider  Davi Mosquera MD as Assigned Surgical Provider    The following health maintenance items are reviewed in Epic and correct as of today:  Health Maintenance Due   Topic Date Due     ANNUAL REVIEW OF HM ORDERS  Never done     COLORECTAL CANCER SCREENING  07/27/2017     COVID-19 Vaccine (4 - Booster for Pfizer series) 06/09/2022     INFLUENZA VACCINE (1) 09/01/2022     G 1 P 1   No LMP recorded. Patient is postmenopausal.     Fasting: No   Td: td 8/2021       Flu: 09/06/22       Covid: Pfboost#1 2/9/2022      Shingrix: done      PPV: done      NO - age 65 - see link Cervical Cytology Screening Guidelines               Cholesterol:   Lab Results   Component Value Date    CHOL 149 08/29/2022    CHOL 186 02/15/2021     Lab Results   Component Value Date    HDL 50 08/29/2022    HDL 47 02/15/2021     Lab Results   Component Value Date    LDL 73 08/29/2022     02/15/2021     Lab Results   Component Value Date    TRIG 132 08/29/2022    TRIG 191 02/15/2021     Lab  Results   Component Value Date    ANH 4.6 03/18/2015         MMG: NA  Dexa:  declines     Flex/colo: declines      Seat Belt: Yes    Sunscreen use: Yes   Calcium Intake: adeq  Health Care Directive: No  Sexually Active: Yes     Current contraception: none  History of abnormal Pap smear: Yes: see [facility]  Family history of colon/breast/ovarian cancer: Yes: see [facility]  Regular self breast exam: Yes  History of abnormal mammogram: No      Labs reviewed in EPIC  BP Readings from Last 3 Encounters:   09/06/22 134/66   02/07/22 (!) 159/80   08/31/21 124/64    Wt Readings from Last 3 Encounters:   09/06/22 76.7 kg (169 lb)   02/07/22 75.8 kg (167 lb)   08/31/21 78.4 kg (172 lb 12.8 oz)                  Patient Active Problem List   Diagnosis     Hyperlipidemia LDL goal <100     Hypertensive hypertrophic cardiomyopathy (H)     Mitral valve insufficiency     Hypertension goal BP (blood pressure) < 140/90     CKD (chronic kidney disease) stage 3, GFR 30-59 ml/min (H)     GERD (gastroesophageal reflux disease)     COPD (chronic obstructive pulmonary disease) (H)     Abnormal glucose     Advanced directives, counseling/discussion     Pseudophakia, ou; Yag Caps, od     Benign neoplasm of colon, unspecified part of colon     Posterior vitreous detachment of both eyes     Prediabetes     Past Surgical History:   Procedure Laterality Date     CATARACT IOL, RT/LT       CHOLECYSTECTOMY, OPEN       COLONOSCOPY N/A 07/27/2015    Procedure: COLONOSCOPY;  Surgeon: Dilip Quintero MD;  Location:  OR     COLONOSCOPY WITH CO2 INSUFFLATION N/A 07/27/2015    Procedure: COLONOSCOPY WITH CO2 INSUFFLATION;  Surgeon: Dilip Quintero MD;  Location:  OR     HC TOOTH EXTRACTION W/FORCEP      false teeth     LASER YAG CAPSULOTOMY Right 01/04/2022     NON-SURGICAL SEPTAL REDUCTION THERAPY W/ CORONARY ARTERIOGRAMS, W/WO TEMPORARY PACEMAKER       PHACOEMULSIFICATION WITH STANDARD INTRAOCULAR LENS IMPLANT  03/2017; 3/2017    left  eye; right eye     SURGICAL HISTORY OF -   04/2010    mitral valvuloplasty [place]       Social History     Tobacco Use     Smoking status: Current Some Day Smoker     Packs/day: 1.00     Years: 51.00     Pack years: 51.00     Types: Cigarettes     Start date: 1/15/1968     Smokeless tobacco: Never Used     Tobacco comment:  smokes   Substance Use Topics     Alcohol use: No     Family History   Problem Relation Age of Onset     Cancer Mother         stomach,bone     Alcohol/Drug Mother         smoker     C.A.D. Father      Cancer Father      Alcohol/Drug Father         smoker     Hypertension Father      Heart Disease Sister      Lipids Daughter      Heart Disease Sister      Breast Cancer Sister 64     Other Cancer Sister         bone , kidney     Brain Cancer Sister      Cerebrovascular Disease Brother      Cardiovascular Brother      Diabetes Brother      Hypertension Brother      Hypertension Brother      Cancer - colorectal Daughter 41     Thyroid Disease No family hx of      Glaucoma No family hx of      Macular Degeneration No family hx of          Current Outpatient Medications   Medication Sig Dispense Refill     albuterol (PROAIR HFA/PROVENTIL HFA/VENTOLIN HFA) 108 (90 Base) MCG/ACT inhaler USE 2 INHALATIONS BY MOUTH  INTO THE LUNGS EVERY 6  HOURS 34 g 2     amLODIPine (NORVASC) 5 MG tablet TAKE 1 TABLET BY MOUTH  DAILY 100 tablet 1     atorvastatin (LIPITOR) 20 MG tablet Take 1 tablet (20 mg) by mouth daily 90 tablet 3     benzonatate (TESSALON) 200 MG capsule Take 1 capsule (200 mg) by mouth 3 times daily as needed for cough 90 capsule 3     CALCIUM 600/VITAMIN D OR 1 everyday       Cholecalciferol (VITAMIN D) 1000 UNIT capsule Take 1 capsule by mouth daily.       CLARITIN 10 MG OR TABS 1 TABLET DAILY       FISH OIL OR one everyday       GLUCOSAMINE 1500 COMPLEX OR one everyday       INCRUSE ELLIPTA 62.5 MCG/INH Inhaler USE 1 INHALATION BY MOUTH  DAILY 90 each 0     irbesartan-hydrochlorothiazide  (AVALIDE) 300-12.5 MG tablet        metoprolol succinate ER (TOPROL XL) 100 MG 24 hr tablet TAKE 1 TABLET BY MOUTH  DAILY 100 tablet 1     omeprazole 20 MG tablet Take 1 tablet by mouth daily. Take 30-60 minutes before a meal. 30 tablet 1     ONE-A-DAY WEIGHT SMART ADVANCE OR 1 every other day       TYLENOL CAPS 500 MG OR 1 CAPSULE EVERY 4 HOURS AS NEEDED       VITAMIN C OR 1 everyday       Recent Labs   Lab Test 09/06/22  1553 08/29/22  0815 02/07/22  1356 08/31/21  0822 08/24/21  0853 02/15/21  0826 10/05/20  0858   A1C  --  6.3*  --  6.3*  --  6.4*  --    LDL  --  73  --   --  101* 101*  --    HDL  --  50  --   --  49* 47*  --    TRIG  --  132  --   --  187* 191*  --    ALT  --   --   --   --  26  --   --    CR 1.10* 1.02   < >  --  1.07* 1.11* 0.99   GFRESTIMATED 52* 57*   < >  --  51* 49* 56*   GFRESTBLACK  --   --   --   --   --  57* 65   POTASSIUM 4.7 4.5   < >  --  4.7 4.7 4.6    < > = values in this interval not displayed.          Mammogram Screening - Patient over age 75, has elected to discontinue screenings.  Pertinent mammograms are reviewed under the imaging tab.    Review of Systems   Constitutional: Negative for chills and fever.   HENT: Negative for congestion, ear pain, hearing loss and sore throat.    Eyes: Negative for pain and visual disturbance.   Respiratory: Negative for cough and shortness of breath.    Cardiovascular: Negative for chest pain, palpitations and peripheral edema.   Gastrointestinal: Negative for abdominal pain, constipation, diarrhea, heartburn, hematochezia and nausea.   Breasts:  Negative for tenderness and discharge.   Genitourinary: Negative for dysuria, frequency, genital sores, hematuria, pelvic pain, urgency, vaginal bleeding and vaginal discharge.   Musculoskeletal: Negative for arthralgias, joint swelling and myalgias.   Skin: Negative for rash.   Neurological: Negative for dizziness, weakness, headaches and paresthesias.   Psychiatric/Behavioral: Negative for mood  "changes. The patient is not nervous/anxious.          OBJECTIVE:   /66   Pulse 60   Temp 97.9  F (36.6  C) (Oral)   Resp 16   Ht 1.581 m (5' 2.25\")   Wt 76.7 kg (169 lb)   SpO2 94%   BMI 30.66 kg/m   Estimated body mass index is 30.66 kg/m  as calculated from the following:    Height as of this encounter: 1.581 m (5' 2.25\").    Weight as of this encounter: 76.7 kg (169 lb).  Physical Exam  GENERAL APPEARANCE: healthy, alert and no distress  EYES: Eyes grossly normal to inspection, PERRL and conjunctivae and sclerae normal  HENT: ear canals and TM's normal, nose and mouth without ulcers or lesions, oropharynx clear and oral mucous membranes moist  NECK: no adenopathy, no asymmetry, masses, or scars and thyroid normal to palpation  RESP: lungs clear to auscultation - no rales, rhonchi or wheezes  BREAST: declined  CV: regular rate and rhythm, normal S1 S2, no S3 or S4, no murmur, click or rub, no peripheral edema and peripheral pulses strong  ABDOMEN: soft, nontender, no hepatosplenomegaly, no masses and bowel sounds normal  MS: no musculoskeletal defects are noted and gait is age appropriate without ataxia  SKIN: no suspicious lesions or rashes  NEURO: Normal strength and tone, sensory exam grossly normal, mentation intact and speech normal  PSYCH: mentation appears normal and affect normal/bright    Diagnostic Test Results:  Labs reviewed in Epic    ASSESSMENT / PLAN:   (Z00.00) Encounter for Medicare annual wellness exam  (primary encounter diagnosis)  Comment: preventive needs reviewed   Plan: see orders in Epic.     (J43.9) Pulmonary emphysema, unspecified emphysema type (H)  Comment: controlled on incruse  Plan: benzonatate (TESSALON) 200 MG capsule,         OFFICE/OUTPT VISIT,EST,LEVL IV         Refill x 6 months   Ok to refill incuse when needed    (I11.9,  I42.2) Hypertensive hypertrophic cardiomyopathy, without heart failure (H)  Comment: stable, no signs of progression  Plan: atorvastatin " "(LIPITOR) 20 MG tablet,         OFFICE/OUTPT VISIT,EST,LEVL IV        Continue lipitor  Refill x 1 yr     (N18.31) Stage 3a chronic kidney disease (H)  Comment: stable  Plan: OFFICE/OUTPT VISIT,EST,LEVL IV        No change to treatment    (E87.1) Hyponatremia  Comment: uncertain etiology   Plan: Basic metabolic panel  (Ca, Cl, CO2, Creat,         Gluc, K, Na, BUN), OFFICE/OUTPT VISIT,EST,LEVL         IV        Recheck today      (Z23) Need for prophylactic vaccination and inoculation against influenza  Comment: due  Plan: INFLUENZA, QUAD, HIGH DOSE, PF, 65YR + (FLUZONE        HD)                  COUNSELING:  Reviewed preventive health counseling, as reflected in patient instructions  Special attention given to:       Regular exercise       Healthy diet/nutrition    Estimated body mass index is 30.66 kg/m  as calculated from the following:    Height as of this encounter: 1.581 m (5' 2.25\").    Weight as of this encounter: 76.7 kg (169 lb).    Weight management plan: Discussed healthy diet and exercise guidelines    She reports that she has been smoking cigarettes. She started smoking about 54 years ago. She has a 51.00 pack-year smoking history. She has never used smokeless tobacco.  Nicotine/Tobacco Cessation Plan:   Information offered: Patient not interested at this time      Appropriate preventive services were discussed with this patient, including applicable screening as appropriate for cardiovascular disease, diabetes, osteopenia/osteoporosis, and glaucoma.  As appropriate for age/gender, discussed screening for colorectal cancer, prostate cancer, breast cancer, and cervical cancer. Checklist reviewing preventive services available has been given to the patient.    Reviewed patients plan of care and provided an AVS. The Intermediate Care Plan ( asthma action plan, low back pain action plan, and migraine action plan) for Kelsea meets the Care Plan requirement. This Care Plan has been established and reviewed " with the Patient.    Counseling Resources:  ATP IV Guidelines  Pooled Cohorts Equation Calculator  Breast Cancer Risk Calculator  Breast Cancer: Medication to Reduce Risk  FRAX Risk Assessment  ICSI Preventive Guidelines  Dietary Guidelines for Americans, 2010  USDA's MyPlate  ASA Prophylaxis  Lung CA Screening    Maritza Lopez MD  [facility]    Identified Health Risks:

## 2022-11-02 DIAGNOSIS — J43.9 PULMONARY EMPHYSEMA, UNSPECIFIED EMPHYSEMA TYPE (H): ICD-10-CM

## 2023-01-13 DIAGNOSIS — N18.31 STAGE 3A CHRONIC KIDNEY DISEASE (H): Primary | ICD-10-CM

## 2023-01-16 ENCOUNTER — OFFICE VISIT (OUTPATIENT)
Dept: OPHTHALMOLOGY | Facility: CLINIC | Age: 78
End: 2023-01-16
Payer: COMMERCIAL

## 2023-01-16 DIAGNOSIS — H52.4 PRESBYOPIA: ICD-10-CM

## 2023-01-16 DIAGNOSIS — Z01.01 ENCOUNTER FOR EXAMINATION OF EYES AND VISION WITH ABNORMAL FINDINGS: ICD-10-CM

## 2023-01-16 DIAGNOSIS — H43.813 POSTERIOR VITREOUS DETACHMENT OF BOTH EYES: ICD-10-CM

## 2023-01-16 DIAGNOSIS — Z96.1 PSEUDOPHAKIA: Primary | ICD-10-CM

## 2023-01-16 PROCEDURE — 92014 COMPRE OPH EXAM EST PT 1/>: CPT | Performed by: OPHTHALMOLOGY

## 2023-01-16 PROCEDURE — 92015 DETERMINE REFRACTIVE STATE: CPT | Performed by: OPHTHALMOLOGY

## 2023-01-16 ASSESSMENT — REFRACTION_MANIFEST
OS_CYLINDER: +1.75
OD_SPHERE: -2.25
OS_AXIS: 005
OD_ADD: +2.50
OS_ADD: +2.50
OD_CYLINDER: +1.25
OD_AXIS: 013
OS_SPHERE: -1.00

## 2023-01-16 ASSESSMENT — VISUAL ACUITY
OD_SC+: -1
OS_PH_SC: 20/25
OD_SC: 20/70
METHOD: SNELLEN - LINEAR
OS_SC: J5
OS_SC: 20/50
OD_PH_SC: 20/40
OS_PH_SC+: +1
OS_SC+: -1
OD_PH_SC+: +1
OD_SC: J2

## 2023-01-16 ASSESSMENT — CONF VISUAL FIELD
OS_INFERIOR_TEMPORAL_RESTRICTION: 0
OS_SUPERIOR_NASAL_RESTRICTION: 0
OD_SUPERIOR_TEMPORAL_RESTRICTION: 0
OS_SUPERIOR_TEMPORAL_RESTRICTION: 0
OD_NORMAL: 1
OS_NORMAL: 1
OD_INFERIOR_NASAL_RESTRICTION: 0
OS_INFERIOR_NASAL_RESTRICTION: 0
OD_SUPERIOR_NASAL_RESTRICTION: 0
OD_INFERIOR_TEMPORAL_RESTRICTION: 0

## 2023-01-16 ASSESSMENT — CUP TO DISC RATIO
OS_RATIO: 0.2
OD_RATIO: 0.5

## 2023-01-16 ASSESSMENT — REFRACTION_WEARINGRX
OD_CYLINDER: +1.25
OD_SPHERE: -2.00
OD_AXIS: 023
OD_ADD: +2.92
OS_CYLINDER: +1.75
OS_AXIS: 002
OS_ADD: 2.93
OS_SPHERE: -1.00

## 2023-01-16 ASSESSMENT — TONOMETRY
OD_IOP_MMHG: 23
IOP_METHOD: APPLANATION
OS_IOP_MMHG: 23

## 2023-01-16 ASSESSMENT — EXTERNAL EXAM - RIGHT EYE: OD_EXAM: PROLAPSED FAT PADS: UPPER, LOWER

## 2023-01-16 ASSESSMENT — EXTERNAL EXAM - LEFT EYE: OS_EXAM: PROLAPSED FAT PADS: UPPER, LOWER

## 2023-01-16 NOTE — PATIENT INSTRUCTIONS
"Glasses prescription given - optional  May use artificial tears up to four times a day (like Refresh Optive, Systane Balance, TheraTears, or generic artificial tears are ok. Avoid \"get the red out\" drops).   Possible clouding of posterior capsule left eye discussed.    Call in September 2023 for an appointment in January 2024 for Complete Exam    Dr. Mosquera [phone]    "

## 2023-01-16 NOTE — PROGRESS NOTES
" Current Eye Medications:  None.      Subjective:  Comprehensive Eye Exam.  No vision changes or concerns - vision is good without correction, distance and near.       Objective:  See Ophthalmology Exam.       Assessment:  Stable eye exam.      ICD-10-CM    1. Pseudophakia, ou; Yag Caps, od  Z96.1       2. Posterior vitreous detachment of both eyes  H43.813       3. Encounter for examination of eyes and vision with abnormal findings  Z01.01       4. Presbyopia  H52.4            Plan:  Glasses prescription given - optional  May use artificial tears up to four times a day (like Refresh Optive, Systane Balance, TheraTears, or generic artificial tears are ok. Avoid \"get the red out\" drops).   Possible clouding of posterior capsule left eye discussed.    Call in September 2023 for an appointment in January 2024 for Complete Exam    Dr. Mosquera [phone]       "

## 2023-01-16 NOTE — LETTER
"    1/16/2023         RE: Kelsea Valentine  [address] [facility] [address]        Dear Colleague,    Thank you for referring your patient, Kelsea Valentine, to the [facility]. Please see a copy of my visit note below.     Current Eye Medications:  None.      Subjective:  Comprehensive Eye Exam.  No vision changes or concerns - vision is good without correction, distance and near.       Objective:  See Ophthalmology Exam.       Assessment:  Stable eye exam.      ICD-10-CM    1. Pseudophakia, ou; Yag Caps, od  Z96.1       2. Posterior vitreous detachment of both eyes  H43.813       3. Encounter for examination of eyes and vision with abnormal findings  Z01.01       4. Presbyopia  H52.4            Plan:  Glasses prescription given - optional  May use artificial tears up to four times a day (like Refresh Optive, Systane Balance, TheraTears, or generic artificial tears are ok. Avoid \"get the red out\" drops).   Possible clouding of posterior capsule left eye discussed.    Call in September 2023 for an appointment in January 2024 for Complete Exam    Dr. Mosquera [phone]           Again, thank you for allowing me to participate in the care of your patient.        Sincerely,        Dvai Mosquera MD    "

## 2023-01-22 DIAGNOSIS — I11.9 HYPERTENSIVE HYPERTROPHIC CARDIOMYOPATHY, WITHOUT HEART FAILURE (H): ICD-10-CM

## 2023-01-22 DIAGNOSIS — I42.2 HYPERTENSIVE HYPERTROPHIC CARDIOMYOPATHY, WITHOUT HEART FAILURE (H): ICD-10-CM

## 2023-01-22 DIAGNOSIS — I10 HYPERTENSION GOAL BP (BLOOD PRESSURE) < 140/90: ICD-10-CM

## 2023-01-23 RX ORDER — AMLODIPINE BESYLATE 5 MG/1
TABLET ORAL
Qty: 100 TABLET | Refills: 1 | Status: SHIPPED | OUTPATIENT
Start: 2023-01-23 | End: 2023-07-26

## 2023-02-06 ENCOUNTER — OFFICE VISIT (OUTPATIENT)
Dept: NEPHROLOGY | Facility: CLINIC | Age: 78
End: 2023-02-06
Payer: COMMERCIAL

## 2023-02-06 ENCOUNTER — LAB (OUTPATIENT)
Dept: LAB | Facility: CLINIC | Age: 78
End: 2023-02-06
Payer: COMMERCIAL

## 2023-02-06 VITALS
SYSTOLIC BLOOD PRESSURE: 135 MMHG | HEART RATE: 54 BPM | OXYGEN SATURATION: 93 % | BODY MASS INDEX: 30.12 KG/M2 | DIASTOLIC BLOOD PRESSURE: 74 MMHG | WEIGHT: 166 LBS | RESPIRATION RATE: 20 BRPM

## 2023-02-06 DIAGNOSIS — R80.9 ALBUMINURIA: Primary | ICD-10-CM

## 2023-02-06 DIAGNOSIS — N18.31 STAGE 3A CHRONIC KIDNEY DISEASE (H): ICD-10-CM

## 2023-02-06 DIAGNOSIS — I10 HYPERTENSION GOAL BP (BLOOD PRESSURE) < 140/90: ICD-10-CM

## 2023-02-06 DIAGNOSIS — R80.9 ALBUMINURIA: ICD-10-CM

## 2023-02-06 DIAGNOSIS — K21.9 GASTROESOPHAGEAL REFLUX DISEASE WITHOUT ESOPHAGITIS: ICD-10-CM

## 2023-02-06 LAB
ALBUMIN MFR UR ELPH: 32.5 MG/DL (ref 1–14)
ALBUMIN SERPL-MCNC: 3.6 G/DL (ref 3.4–5)
ALBUMIN UR-MCNC: 30 MG/DL
ANION GAP SERPL CALCULATED.3IONS-SCNC: 5 MMOL/L (ref 3–14)
APPEARANCE UR: CLEAR
BACTERIA #/AREA URNS HPF: ABNORMAL /HPF
BILIRUB UR QL STRIP: NEGATIVE
BUN SERPL-MCNC: 21 MG/DL (ref 7–30)
CALCIUM SERPL-MCNC: 9.5 MG/DL (ref 8.5–10.1)
CHLORIDE BLD-SCNC: 105 MMOL/L (ref 94–109)
CO2 SERPL-SCNC: 28 MMOL/L (ref 20–32)
COLOR UR AUTO: YELLOW
CREAT SERPL-MCNC: 1.21 MG/DL (ref 0.52–1.04)
CREAT UR-MCNC: 111 MG/DL
GFR SERPL CREATININE-BSD FRML MDRD: 46 ML/MIN/1.73M2
GLUCOSE BLD-MCNC: 115 MG/DL (ref 70–99)
GLUCOSE UR STRIP-MCNC: NEGATIVE MG/DL
HGB BLD-MCNC: 15 G/DL (ref 11.7–15.7)
HGB UR QL STRIP: NEGATIVE
KETONES UR STRIP-MCNC: NEGATIVE MG/DL
LEUKOCYTE ESTERASE UR QL STRIP: ABNORMAL
NITRATE UR QL: NEGATIVE
PH UR STRIP: 6.5 [PH] (ref 5–7)
PHOSPHATE SERPL-MCNC: 3.6 MG/DL (ref 2.5–4.5)
POTASSIUM BLD-SCNC: 4.9 MMOL/L (ref 3.4–5.3)
PROT/CREAT 24H UR: 0.29 MG/MG CR (ref 0–0.2)
PTH-INTACT SERPL-MCNC: 33 PG/ML (ref 15–65)
RBC #/AREA URNS AUTO: ABNORMAL /HPF
SKIP: ABNORMAL
SODIUM SERPL-SCNC: 138 MMOL/L (ref 133–144)
SP GR UR STRIP: 1.01 (ref 1–1.03)
SQUAMOUS #/AREA URNS AUTO: ABNORMAL /LPF
UROBILINOGEN UR STRIP-MCNC: NORMAL MG/DL
WBC #/AREA URNS AUTO: ABNORMAL /HPF

## 2023-02-06 PROCEDURE — 83970 ASSAY OF PARATHORMONE: CPT

## 2023-02-06 PROCEDURE — 99214 OFFICE O/P EST MOD 30 MIN: CPT | Performed by: INTERNAL MEDICINE

## 2023-02-06 PROCEDURE — 80069 RENAL FUNCTION PANEL: CPT

## 2023-02-06 PROCEDURE — 84156 ASSAY OF PROTEIN URINE: CPT

## 2023-02-06 PROCEDURE — 81001 URINALYSIS AUTO W/SCOPE: CPT

## 2023-02-06 PROCEDURE — 85018 HEMOGLOBIN: CPT

## 2023-02-06 PROCEDURE — 36415 COLL VENOUS BLD VENIPUNCTURE: CPT

## 2023-02-06 RX ORDER — DAPAGLIFLOZIN 10 MG/1
10 TABLET, FILM COATED ORAL DAILY
Qty: 30 TABLET | Refills: 2 | Status: SHIPPED | OUTPATIENT
Start: 2023-02-06 | End: 2023-04-13

## 2023-02-06 ASSESSMENT — PAIN SCALES - GENERAL: PAINLEVEL: NO PAIN (1)

## 2023-02-06 NOTE — NURSING NOTE
Kelsea Valentine's goals for this visit include: Arm pain (L)  She requests these members of her care team be copied on today's visit information: YES    PCP: Maritza Lopez    Referring Provider:  No referring provider defined for this encounter.    /74   Pulse 54   Resp 20   Wt 75.3 kg (166 lb)   SpO2 93%   BMI 30.12 kg/m      Do you need any medication refills at today's visit? NONE    Nick Cross, EMT

## 2023-02-06 NOTE — PATIENT INSTRUCTIONS
Start farxiga 10 mg daily  Labs 2 weeks after starting the Farxiga - [facility]  Monitor blood pressure at home the 2 weeks after starting Farxiga. Goal is 110-130 - please update if you find you are outside that range.   Focus on good hydration  Labs in 6 months  Follow-up in one year with labs at that time.       [phone] (Kathleen Palacios Matthew)

## 2023-02-10 ENCOUNTER — TELEPHONE (OUTPATIENT)
Dept: NEPHROLOGY | Facility: CLINIC | Age: 78
End: 2023-02-10
Payer: COMMERCIAL

## 2023-02-10 NOTE — TELEPHONE ENCOUNTER
[facility] Call Center    Phone Message    May a detailed message be left on voicemail: no    Reason for Call: Medication Question or concern regarding medication   Prescription Clarification  Name of Medication: dapagliflozin (FARXIGA) 10 MG TABS tablet  Prescribing Provider: Carolin   Pharmacy:    [facility] - [place], [place] - [address]   What on the order needs clarification?     The patient called stating she received this medication in the mail but was expecting the generic version. She is wondering if she should send this back, and wait for the generic? Please review and contact patient. She says if she doesn't answer, care team can speak with her  too. Thank you.    Action Taken: Message routed to:  Adult Clinics: Nephrology p [phone]    Travel Screening: Not Applicable

## 2023-02-13 NOTE — TELEPHONE ENCOUNTER
[email or web address]    Spoke to patient. Advised that we would be in further touch.    Suzanne Lopez RN, BSN  Nephrology Care Coordinator  [facility]

## 2023-02-13 NOTE — TELEPHONE ENCOUNTER
Patient not available. Spoke to patient's spouse Haile. Provied information that Farxiga does not have a generic formulation at this time. He mentioned that the med was $131.00 for a 90 day supply. He asked that I call back later today to discuss with Kelsea. Will plan to send her information for drug company information to see if they would be eligible for free or reduced cost drug.     [email or web address]    Will advise that patient seek website above to review options for cost savings.

## 2023-02-21 NOTE — PROGRESS NOTES
NEPHROLOGY FOLLOW UP  23     Kelsea Valentine  MRN: [id number]  : 1945    CC: follow up of albuminuria    HPI: Kelsea Valentine is a 78 year old female who presents for follow up of albuminuria.   HIstory taken from previous visits:   Ms. 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   Since her last visit 2020, she reports no interim hospitalizations. However, she did have a UTI and was treated with Abx. She felt better after the Abx. Incidentally, she did have COVID testing which was negative.   Currently, no hematuria or dysuria. No fevers, no rashes. Minimal LE at the end of the work day, normal when she wakes up. No orthopnea or PND. She does not physically exert herself too much, she does have SOB with exertion related to COPD. However, with shopping, home activities, and work - no chest pain. She has reduced her tobacco use to less than 1 ppd.   She does check her BP at home - running 120-130/70-80.     22: in person visit. We spent time discussing the potential benefit of SGLT2 inhibitors. She has no hx of UTIs. No infection. She uses tylenol. She does use tobacco - <1 ppd. She is on irbesartan 300 mg daily. She has not followed home BP recently. Reviewd family hx:  - daughter with colon CA, hypertension; father with kidney failure, DM; sister with cancers, mother with cancers.     23: in person visit. Creatinine has been 0.99-1.16; 1.21 currently. She was fasting today so feels she could have been dehydrated. No home BP readings recently. Discussed cost options for SGLT2 inhibitors.       albuterol (PROAIR HFA/PROVENTIL HFA/VENTOLIN HFA) 108 (90 Base) MCG/ACT inhaler, USE 2 INHALATIONS BY MOUTH  INTO THE LUNGS EVERY 6  HOURS  amLODIPine (NORVASC) 5 MG tablet, TAKE 1 TABLET BY MOUTH  DAILY  atorvastatin (LIPITOR) 20 MG tablet, Take 1 tablet (20 mg) by mouth daily  benzonatate (TESSALON) 200 MG capsule, Take 1 capsule  (200 mg) by mouth 3 times daily as needed for cough  CALCIUM 600/VITAMIN D OR, 1 everyday  Cholecalciferol (VITAMIN D) 1000 UNIT capsule, Take 1 capsule by mouth daily.  CLARITIN 10 MG OR TABS, 1 TABLET DAILY  FISH OIL OR, one everyday  GLUCOSAMINE 1500 COMPLEX OR, one everyday  INCRUSE ELLIPTA 62.5 MCG/INH Inhaler, USE 1 INHALATION BY MOUTH  DAILY  irbesartan-hydrochlorothiazide (AVALIDE) 300-12.5 MG tablet,   metoprolol succinate ER (TOPROL XL) 100 MG 24 hr tablet, TAKE 1 TABLET BY MOUTH  DAILY  omeprazole 20 MG tablet, Take 1 tablet by mouth daily. Take 30-60 minutes before a meal.  ONE-A-DAY WEIGHT SMART ADVANCE OR, 1 every other day  TYLENOL CAPS 500 MG OR, 1 CAPSULE EVERY 4 HOURS AS NEEDED  VITAMIN C OR, 1 everyday    No current facility-administered medications on file prior to visit.    Exam:  Blood pressure 135/74, pulse 54, resp. rate 20, weight 75.3 kg (166 lb), SpO2 93 %.       Results:   Lab on 02/06/2023   Component Date Value Ref Range Status     Sodium 02/06/2023 138  133 - 144 mmol/L Final     Potassium 02/06/2023 4.9  3.4 - 5.3 mmol/L Final     Chloride 02/06/2023 105  94 - 109 mmol/L Final     Carbon Dioxide (CO2) 02/06/2023 28  20 - 32 mmol/L Final     Anion Gap 02/06/2023 5  3 - 14 mmol/L Final     Urea Nitrogen 02/06/2023 21  7 - 30 mg/dL Final     Creatinine 02/06/2023 1.21 (H)  0.52 - 1.04 mg/dL Final     Calcium 02/06/2023 9.5  8.5 - 10.1 mg/dL Final     Glucose 02/06/2023 115 (H)  70 - 99 mg/dL Final     Albumin 02/06/2023 3.6  3.4 - 5.0 g/dL Final     Phosphorus 02/06/2023 3.6  2.5 - 4.5 mg/dL Final     GFR Estimate 02/06/2023 46 (L)  >60 mL/min/1.73m2 Final    eGFR calculated using 2021 CKD-EPI equation.     Total Protein Urine mg/dL 02/06/2023 32.5 (H)  1.0 - 14.0 mg/dL Final    The reference ranges have not been established in urine protein. The results should be integrated into the clinical context for interpretation.     Total Protein UR MG/MG CR 02/06/2023 0.29 (H)  0.00 - 0.20 mg/mg  Cr Final     Creatinine Urine mg/dL 02/06/2023 111.0  mg/dL Final    The reference ranges have not been established in urine creatinine. The results should be integrated into the clinical context for interpretation.     Parathyroid Hormone Intact 02/06/2023 33  15 - 65 pg/mL Final     Hemoglobin 02/06/2023 15.0  11.7 - 15.7 g/dL Final     Color Urine 02/06/2023 Yellow  Colorless, Straw, Light Yellow, Yellow Final     Appearance Urine 02/06/2023 Clear  Clear Final     Glucose Urine 02/06/2023 Negative  Negative mg/dL Final     Bilirubin Urine 02/06/2023 Negative  Negative Final     Ketones Urine 02/06/2023 Negative  Negative mg/dL Final     Specific Gravity Urine 02/06/2023 1.010  1.003 - 1.035 Final     Blood Urine 02/06/2023 Negative  Negative Final     pH Urine 02/06/2023 6.5  5.0 - 7.0 Final     Protein Albumin Urine 02/06/2023 30 (A)  Negative mg/dL Final     Urobilinogen Urine 02/06/2023 Normal  Normal, 2.0 mg/dL Final     Nitrite Urine 02/06/2023 Negative  Negative Final     Leukocyte Esterase Urine 02/06/2023 Trace (A)  Negative Final     Bacteria Urine 02/06/2023 Few (A)  None Seen /HPF Final     RBC Urine 02/06/2023 0-2  0-2 /HPF /HPF Final     WBC Urine 02/06/2023 0-5  0-5 /HPF /HPF Final    Unconcentrated specimen     Squamous Epithelials Urine 02/06/2023 Few (A)  None Seen /LPF Final       Assessment/Plan:   1. CKD 3 with Proteinuria:. Etiology likely related to longstanding HTN and smoking (PLA2r, SPEP neg). She does not have hematuria or pyuria to suggest proliferative, inflamm GN. However, if UPCR/UACR are further elevated, may need to consider further investigation (additional serology, biopsy).  Congratulated on risk reduction with cutting back on tobacco use. BP is at goal as well. Additionally, she is on anti-proteinuric therapy with irbesartan. Will start Farxiga for addt protein lowering effects. Recommend labs in 2 weeks to assure stability.     2. Hypertension: BP is just above goal of <130/80  - Farxiga should bring this consistently into goal range.     3. GERD: on PPI therapy - reports GERD is well controlled on current dose. Creatinine has been stable so seems unlikely that this is contributing to kidney injury at this time.       Patient Instructions   1. Start farxiga 10 mg daily  2. Labs 2 weeks after starting the Farxiga - [facility]  3. Monitor blood pressure at home the 2 weeks after starting Farxiga. Goal is 110-130 - please update if you find you are outside that range.   4. Focus on good hydration  5. Labs in 6 months  6. Follow-up in one year with labs at that time.       [phone] (Kathleen Palacios Matthew)       Joshua Coe, DO

## 2023-04-06 DIAGNOSIS — J43.9 PULMONARY EMPHYSEMA, UNSPECIFIED EMPHYSEMA TYPE (H): ICD-10-CM

## 2023-04-07 RX ORDER — UMECLIDINIUM 62.5 UG/1
AEROSOL, POWDER ORAL
Qty: 90 EACH | Refills: 0 | Status: SHIPPED | OUTPATIENT
Start: 2023-04-07 | End: 2023-05-08

## 2023-04-09 DIAGNOSIS — R80.9 ALBUMINURIA: ICD-10-CM

## 2023-04-13 RX ORDER — DAPAGLIFLOZIN 10 MG/1
10 TABLET, FILM COATED ORAL DAILY
Qty: 90 TABLET | Refills: 3 | Status: SHIPPED | OUTPATIENT
Start: 2023-04-13 | End: 2023-09-11

## 2023-04-14 ENCOUNTER — TELEPHONE (OUTPATIENT)
Dept: NEPHROLOGY | Facility: CLINIC | Age: 78
End: 2023-04-14
Payer: COMMERCIAL

## 2023-04-14 NOTE — TELEPHONE ENCOUNTER
Called and spoke with pt.  Calling to assist pt with scheduling a Lab appt after starting Farxiga.  Pt verbalized understanding and agreed with plan.  Pt requested [facility] location.    Scheduled next available 4/21/23.  Informed pt this lab test is not fasting and to be well hydrated.    Encouraged to call if further questions or concerns.    Kathleen Perla LPN

## 2023-04-21 ENCOUNTER — LAB (OUTPATIENT)
Dept: LAB | Facility: CLINIC | Age: 78
End: 2023-04-21
Payer: COMMERCIAL

## 2023-04-21 DIAGNOSIS — R80.9 ALBUMINURIA: ICD-10-CM

## 2023-04-21 LAB
ANION GAP SERPL CALCULATED.3IONS-SCNC: 2 MMOL/L (ref 3–14)
BUN SERPL-MCNC: 22 MG/DL (ref 7–30)
CALCIUM SERPL-MCNC: 9.4 MG/DL (ref 8.5–10.1)
CHLORIDE BLD-SCNC: 101 MMOL/L (ref 94–109)
CO2 SERPL-SCNC: 30 MMOL/L (ref 20–32)
CREAT SERPL-MCNC: 1.19 MG/DL (ref 0.52–1.04)
GFR SERPL CREATININE-BSD FRML MDRD: 47 ML/MIN/1.73M2
GLUCOSE BLD-MCNC: 103 MG/DL (ref 70–99)
POTASSIUM BLD-SCNC: 4.5 MMOL/L (ref 3.4–5.3)
SODIUM SERPL-SCNC: 133 MMOL/L (ref 133–144)

## 2023-04-21 PROCEDURE — 36415 COLL VENOUS BLD VENIPUNCTURE: CPT

## 2023-04-21 PROCEDURE — 80048 BASIC METABOLIC PNL TOTAL CA: CPT

## 2023-05-08 ENCOUNTER — OFFICE VISIT (OUTPATIENT)
Dept: FAMILY MEDICINE | Facility: CLINIC | Age: 78
End: 2023-05-08
Payer: COMMERCIAL

## 2023-05-08 VITALS
TEMPERATURE: 97.3 F | DIASTOLIC BLOOD PRESSURE: 64 MMHG | BODY MASS INDEX: 30.66 KG/M2 | HEIGHT: 62 IN | RESPIRATION RATE: 16 BRPM | WEIGHT: 166.6 LBS | HEART RATE: 58 BPM | SYSTOLIC BLOOD PRESSURE: 99 MMHG | OXYGEN SATURATION: 93 %

## 2023-05-08 DIAGNOSIS — I42.2 HYPERTENSIVE HYPERTROPHIC CARDIOMYOPATHY, WITHOUT HEART FAILURE (H): ICD-10-CM

## 2023-05-08 DIAGNOSIS — N18.31 STAGE 3A CHRONIC KIDNEY DISEASE (H): ICD-10-CM

## 2023-05-08 DIAGNOSIS — J43.9 PULMONARY EMPHYSEMA, UNSPECIFIED EMPHYSEMA TYPE (H): Primary | ICD-10-CM

## 2023-05-08 DIAGNOSIS — I11.9 HYPERTENSIVE HYPERTROPHIC CARDIOMYOPATHY, WITHOUT HEART FAILURE (H): ICD-10-CM

## 2023-05-08 PROCEDURE — 99214 OFFICE O/P EST MOD 30 MIN: CPT | Performed by: FAMILY MEDICINE

## 2023-05-08 ASSESSMENT — ENCOUNTER SYMPTOMS: HYPERTENSION: 1

## 2023-05-08 ASSESSMENT — PAIN SCALES - GENERAL: PAINLEVEL: NO PAIN (0)

## 2023-05-08 NOTE — PROGRESS NOTES
"  Assessment & Plan     (J43.9) Pulmonary emphysema, unspecified emphysema type (H)  (primary encounter diagnosis)  Comment: increased symptoms  Plan: Fluticasone-Umeclidin-Vilant (TRELEGY ELLIPTA)         100-62.5-25 MCG/ACT oral inhaler        Change Incruse to Trelegy    (I11.9,  I42.2) Hypertensive hypertrophic cardiomyopathy, without heart failure (H)  Comment: stable  Plan: continue to control bp though getting some orthostasis  Cut amlodipine in half and monitor bp.  May consider stopping it if remains well controlled.    (N18.31) Stage 3a chronic kidney disease (H)  Comment: stable  Plan: Albumin Random Urine Quantitative with Creat         Ratio        Due with next labs.                BMI:   Estimated body mass index is 30.23 kg/m  as calculated from the following:    Height as of this encounter: 1.581 m (5' 2.25\").    Weight as of this encounter: 75.6 kg (166 lb 9.6 oz).   Weight management plan: Discussed healthy diet and exercise guidelines    Patient Instructions     Start cutting amlodipine in half.        Return after at least 2 weeks on new dose to pharmacy for blood pressure check, bring your blood pressure cuff in for validation    Trial new inhaler to replace Incruse.      Maritza Lopez MD  [facility]   Kelsea is a 78 year old, presenting for the following health issues:  Lipids and Hypertension        5/8/2023     1:49 PM   Additional Questions   Roomed by Jane LOPEZ   Accompanied by self     Hypertension     History of Present Illness       Reason for visit:  Checku    She eats 0-1 servings of fruits and vegetables daily.She consumes 3 sweetened beverage(s) daily.She exercises with enough effort to increase her heart rate 9 or less minutes per day.  She exercises with enough effort to increase her heart rate 3 or less days per week.   She is taking medications regularly.     Some increased cough and shortness of breath especially with humidity.  Currently " "using Incruse daily and then albuterol as needed.  Does get shortness of breath with grocery shopping    Blood pressure has been fine, occas dizziness with getting up from sitting.  Continues to work to quit smoking            Review of Systems   Constitutional, HEENT, cardiovascular, pulmonary, gi and gu systems are negative, except as otherwise noted.      Objective    BP 99/64   Pulse 58   Temp 97.3  F (36.3  C) (Tympanic)   Resp 16   Ht 1.581 m (5' 2.25\")   Wt 75.6 kg (166 lb 9.6 oz)   SpO2 93%   BMI 30.23 kg/m    Body mass index is 30.23 kg/m .  Physical Exam   GENERAL: healthy, alert and no distress, tobacco odor  EYES: Eyes grossly normal to inspection, PERRL and conjunctivae and sclerae normal  RESP: lungs clear to auscultation - no rales, rhonchi or wheezes  CV: regular rate and rhythm, normal S1 S2, no S3 or S4, no murmur, click or rub, no peripheral edema and peripheral pulses strong  MS: no gross musculoskeletal defects noted, no edema  SKIN: no suspicious lesions or rashes  PSYCH: mentation appears normal, affect normal/bright                    "

## 2023-05-08 NOTE — PATIENT INSTRUCTIONS
Start cutting amlodipine in half.        Return after at least 2 weeks on new dose to pharmacy for blood pressure check, bring your blood pressure cuff in for validation    Trial new inhaler to replace Incruse.

## 2023-05-22 ENCOUNTER — ALLIED HEALTH/NURSE VISIT (OUTPATIENT)
Dept: FAMILY MEDICINE | Facility: CLINIC | Age: 78
End: 2023-05-22
Payer: COMMERCIAL

## 2023-05-22 VITALS — HEART RATE: 57 BPM | DIASTOLIC BLOOD PRESSURE: 56 MMHG | SYSTOLIC BLOOD PRESSURE: 126 MMHG

## 2023-05-22 DIAGNOSIS — Z01.30 BLOOD PRESSURE CHECK: Primary | ICD-10-CM

## 2023-05-22 PROCEDURE — 99207 PR NO CHARGE NURSE ONLY: CPT | Performed by: FAMILY MEDICINE

## 2023-05-22 NOTE — PROGRESS NOTES
Kelsea Valentine was evaluated at [facility] on May 22, 2023 at which time her blood pressure was:    BP Readings from Last 3 Encounters:   05/22/23 126/56   05/08/23 99/64   02/06/23 135/74     Pulse Readings from Last 3 Encounters:   05/22/23 57   05/08/23 58   02/06/23 54       Reviewed lifestyle modifications for blood pressure control and reduction: including making healthy food choices, managing weight, getting regular exercise, smoking cessation, reducing alcohol consumption, monitoring blood pressure regularly.     Symptoms: None    BP Goal:< 140/90 mmHg    BP Assessment:  BP at goal    Potential Reasons for BP too high: NA - Not applicable    BP Follow-Up Plan: Recheck BP in 6 months at pharmacy    Recommendation to Provider: none    Note completed by: Norman Kemp RPh.  [facility]  [phone]

## 2023-06-01 ENCOUNTER — TELEPHONE (OUTPATIENT)
Dept: FAMILY MEDICINE | Facility: CLINIC | Age: 78
End: 2023-06-01
Payer: COMMERCIAL

## 2023-06-01 DIAGNOSIS — J43.9 PULMONARY EMPHYSEMA, UNSPECIFIED EMPHYSEMA TYPE (H): ICD-10-CM

## 2023-06-01 NOTE — TELEPHONE ENCOUNTER
Patient is calling. She said that the trelley is over $300 for 2 of them. She is going to stay on her current medication, since it is whorking.Zeinab Bowers MA/TC

## 2023-06-19 DIAGNOSIS — J43.9 PULMONARY EMPHYSEMA, UNSPECIFIED EMPHYSEMA TYPE (H): ICD-10-CM

## 2023-06-19 RX ORDER — FLUTICASONE FUROATE, UMECLIDINIUM BROMIDE AND VILANTEROL TRIFENATATE 100; 62.5; 25 UG/1; UG/1; UG/1
POWDER RESPIRATORY (INHALATION)
Refills: 5 | OUTPATIENT
Start: 2023-06-19

## 2023-07-24 DIAGNOSIS — I42.2 HYPERTENSIVE HYPERTROPHIC CARDIOMYOPATHY, WITHOUT HEART FAILURE (H): ICD-10-CM

## 2023-07-24 DIAGNOSIS — I11.9 HYPERTENSIVE HYPERTROPHIC CARDIOMYOPATHY, WITHOUT HEART FAILURE (H): ICD-10-CM

## 2023-07-25 DIAGNOSIS — I11.9 HYPERTENSIVE HYPERTROPHIC CARDIOMYOPATHY, WITHOUT HEART FAILURE (H): ICD-10-CM

## 2023-07-25 DIAGNOSIS — I42.2 HYPERTENSIVE HYPERTROPHIC CARDIOMYOPATHY, WITHOUT HEART FAILURE (H): ICD-10-CM

## 2023-07-25 DIAGNOSIS — I10 HYPERTENSION GOAL BP (BLOOD PRESSURE) < 140/90: ICD-10-CM

## 2023-07-26 RX ORDER — AMLODIPINE BESYLATE 5 MG/1
TABLET ORAL
Qty: 100 TABLET | Refills: 0 | Status: SHIPPED | OUTPATIENT
Start: 2023-07-26 | End: 2023-09-11

## 2023-07-26 RX ORDER — ATORVASTATIN CALCIUM 20 MG/1
TABLET, FILM COATED ORAL
Qty: 100 TABLET | Refills: 2 | OUTPATIENT
Start: 2023-07-26

## 2023-07-28 ENCOUNTER — DOCUMENTATION ONLY (OUTPATIENT)
Dept: LAB | Facility: CLINIC | Age: 78
End: 2023-07-28
Payer: COMMERCIAL

## 2023-07-28 DIAGNOSIS — N18.31 STAGE 3A CHRONIC KIDNEY DISEASE (H): Primary | ICD-10-CM

## 2023-07-28 NOTE — PROGRESS NOTES
Patient has an upcoming appointment requesting labs for you, currently there is no orders. Please place orders as needed. If no labs are needed please have care team reach out to patient.    Thank you!   Madalyn Nova

## 2023-08-25 ENCOUNTER — DOCUMENTATION ONLY (OUTPATIENT)
Dept: FAMILY MEDICINE | Facility: CLINIC | Age: 78
End: 2023-08-25
Payer: COMMERCIAL

## 2023-08-25 DIAGNOSIS — E78.5 HYPERLIPIDEMIA LDL GOAL <100: ICD-10-CM

## 2023-08-25 DIAGNOSIS — R73.03 PREDIABETES: ICD-10-CM

## 2023-08-25 DIAGNOSIS — N18.31 STAGE 3A CHRONIC KIDNEY DISEASE (H): Primary | ICD-10-CM

## 2023-08-25 NOTE — PROGRESS NOTES
Kelsea Valentine has an upcoming lab appointment:    Future Appointments   Date Time Provider Department Center   9/6/2023  7:15 AM AN LAB [facility]   9/11/2023  2:30 PM Maritza Lopez MD [facility]   2/5/2024  8:30 AM LAB FIRST FLOOR [facility] [place]   2/5/2024  9:00 AM Joshua Coe DO [facility]       There is no order available. Please review and place either future orders or HMPO (Review of Health Maintenance Protocol Orders), as appropriate.    Health Maintenance Due   Topic    ANNUAL REVIEW OF HM ORDERS     MICROALBUMIN     A1C     LIPID      Aleja ANTHONYT

## 2023-09-06 ENCOUNTER — LAB (OUTPATIENT)
Dept: LAB | Facility: CLINIC | Age: 78
End: 2023-09-06
Payer: COMMERCIAL

## 2023-09-06 DIAGNOSIS — R73.03 PREDIABETES: ICD-10-CM

## 2023-09-06 DIAGNOSIS — E78.5 HYPERLIPIDEMIA LDL GOAL <100: ICD-10-CM

## 2023-09-06 DIAGNOSIS — N18.31 STAGE 3A CHRONIC KIDNEY DISEASE (H): ICD-10-CM

## 2023-09-06 LAB
ALBUMIN SERPL BCG-MCNC: 4.3 G/DL (ref 3.5–5.2)
ANION GAP SERPL CALCULATED.3IONS-SCNC: 12 MMOL/L (ref 7–15)
BUN SERPL-MCNC: 17 MG/DL (ref 8–23)
CALCIUM SERPL-MCNC: 9.7 MG/DL (ref 8.8–10.2)
CHLORIDE SERPL-SCNC: 94 MMOL/L (ref 98–107)
CHOLEST SERPL-MCNC: 168 MG/DL
CREAT SERPL-MCNC: 1.08 MG/DL (ref 0.51–0.95)
DEPRECATED HCO3 PLAS-SCNC: 24 MMOL/L (ref 22–29)
EGFRCR SERPLBLD CKD-EPI 2021: 53 ML/MIN/1.73M2
GLUCOSE SERPL-MCNC: 112 MG/DL (ref 70–99)
HBA1C MFR BLD: 6.5 % (ref 0–5.6)
HDLC SERPL-MCNC: 44 MG/DL
HGB BLD-MCNC: 15.4 G/DL (ref 11.7–15.7)
LDLC SERPL CALC-MCNC: 86 MG/DL
NONHDLC SERPL-MCNC: 124 MG/DL
PHOSPHATE SERPL-MCNC: 3.4 MG/DL (ref 2.5–4.5)
POTASSIUM SERPL-SCNC: 4.2 MMOL/L (ref 3.4–5.3)
SODIUM SERPL-SCNC: 130 MMOL/L (ref 136–145)
TRIGL SERPL-MCNC: 190 MG/DL

## 2023-09-06 PROCEDURE — 83036 HEMOGLOBIN GLYCOSYLATED A1C: CPT

## 2023-09-06 PROCEDURE — 36415 COLL VENOUS BLD VENIPUNCTURE: CPT

## 2023-09-06 PROCEDURE — 80069 RENAL FUNCTION PANEL: CPT

## 2023-09-06 PROCEDURE — 85018 HEMOGLOBIN: CPT

## 2023-09-06 PROCEDURE — 80061 LIPID PANEL: CPT

## 2023-09-06 NOTE — LETTER
September 7, 2023      Kelsea Valentine  [address] [facility] [address]        Dear ,    We are writing to inform you of your test results. Kidney function (creatinine) is stable.     Your sodium level was low at 130. With this finding, I feel we should be trying to get you away from the hydrochlorothiazide that you are currently taking for blood pressure. You are on a combination pill of irbesartan/hydrochlorothiazide. We could trial stopping this and using irbesartan alone.     When able:   1. Please update on recent blood pressure readings from home   2. Let me know if you would be interested in this trial.     Resulted Orders   Renal panel   Result Value Ref Range    Sodium 130 (L) 136 - 145 mmol/L    Potassium 4.2 3.4 - 5.3 mmol/L    Chloride 94 (L) 98 - 107 mmol/L    Carbon Dioxide (CO2) 24 22 - 29 mmol/L    Anion Gap 12 7 - 15 mmol/L    Glucose 112 (H) 70 - 99 mg/dL    Urea Nitrogen 17.0 8.0 - 23.0 mg/dL    Creatinine 1.08 (H) 0.51 - 0.95 mg/dL    GFR Estimate 53 (L) >60 mL/min/1.73m2    Calcium 9.7 8.8 - 10.2 mg/dL    Albumin 4.3 3.5 - 5.2 g/dL    Phosphorus 3.4 2.5 - 4.5 mg/dL   Hemoglobin   Result Value Ref Range    Hemoglobin 15.4 11.7 - 15.7 g/dL       If you have any questions or concerns, please call the clinic at the number listed above.     Sincerely,  Joshua Coe, DO/sl

## 2023-09-07 ENCOUNTER — TELEPHONE (OUTPATIENT)
Dept: NEPHROLOGY | Facility: CLINIC | Age: 78
End: 2023-09-07
Payer: COMMERCIAL

## 2023-09-07 DIAGNOSIS — I42.2 HYPERTENSIVE HYPERTROPHIC CARDIOMYOPATHY, WITHOUT HEART FAILURE (H): ICD-10-CM

## 2023-09-07 DIAGNOSIS — I11.9 HYPERTENSIVE HYPERTROPHIC CARDIOMYOPATHY, WITHOUT HEART FAILURE (H): ICD-10-CM

## 2023-09-07 DIAGNOSIS — I10 HYPERTENSION GOAL BP (BLOOD PRESSURE) < 140/90: ICD-10-CM

## 2023-09-07 NOTE — TELEPHONE ENCOUNTER
Attempted to reach patient. No answer.     Clinic to attempt to reach patient at a later date and time.

## 2023-09-07 NOTE — TELEPHONE ENCOUNTER
Received result note from Dr. Coe.     Your most recent lab results are attached.  Kidney function (creatinine) is stable.     Your sodium level was low at 130. With this finding, I feel we should be trying to get you away from the hydrochlorothiazide that you are currently taking for blood pressure. You are on a combination pill of irbesartan/hydrochlorothiazide. We could trial stopping this and using irbesartan alone.     When able:   1. Please update on recent blood pressure readings from home   2. Let me know if you would be interested in this trial.     Joshua Coe, DO     Team: patient is not on mychart - appreciate your help in reaching out to her with this recommendation. KW      Clinic to contact patient to discuss.

## 2023-09-08 RX ORDER — METOPROLOL SUCCINATE 100 MG/1
TABLET, EXTENDED RELEASE ORAL
Qty: 100 TABLET | Refills: 2 | Status: SHIPPED | OUTPATIENT
Start: 2023-09-08 | End: 2024-05-30

## 2023-09-08 NOTE — TELEPHONE ENCOUNTER
Patient's mobile phone called to relay result note from Dr Coe.  Left vm to return call.  Judith, RN Care Coordinator  Nephrology

## 2023-09-11 ENCOUNTER — OFFICE VISIT (OUTPATIENT)
Dept: FAMILY MEDICINE | Facility: CLINIC | Age: 78
End: 2023-09-11
Payer: COMMERCIAL

## 2023-09-11 VITALS
OXYGEN SATURATION: 94 % | HEART RATE: 55 BPM | RESPIRATION RATE: 16 BRPM | DIASTOLIC BLOOD PRESSURE: 75 MMHG | HEIGHT: 62 IN | TEMPERATURE: 98.1 F | SYSTOLIC BLOOD PRESSURE: 133 MMHG | WEIGHT: 169 LBS | BODY MASS INDEX: 31.1 KG/M2

## 2023-09-11 DIAGNOSIS — Z00.00 ENCOUNTER FOR MEDICARE ANNUAL WELLNESS EXAM: Primary | ICD-10-CM

## 2023-09-11 DIAGNOSIS — N18.31 TYPE 2 DIABETES MELLITUS WITH STAGE 3A CHRONIC KIDNEY DISEASE, WITHOUT LONG-TERM CURRENT USE OF INSULIN (H): ICD-10-CM

## 2023-09-11 DIAGNOSIS — E11.22 TYPE 2 DIABETES MELLITUS WITH STAGE 3A CHRONIC KIDNEY DISEASE, WITHOUT LONG-TERM CURRENT USE OF INSULIN (H): ICD-10-CM

## 2023-09-11 DIAGNOSIS — N18.31 STAGE 3A CHRONIC KIDNEY DISEASE (H): ICD-10-CM

## 2023-09-11 DIAGNOSIS — I42.2 HYPERTENSIVE HYPERTROPHIC CARDIOMYOPATHY, WITHOUT HEART FAILURE (H): ICD-10-CM

## 2023-09-11 DIAGNOSIS — I10 HYPERTENSION GOAL BP (BLOOD PRESSURE) < 140/90: ICD-10-CM

## 2023-09-11 DIAGNOSIS — I11.9 HYPERTENSIVE HYPERTROPHIC CARDIOMYOPATHY, WITHOUT HEART FAILURE (H): ICD-10-CM

## 2023-09-11 DIAGNOSIS — J43.9 PULMONARY EMPHYSEMA, UNSPECIFIED EMPHYSEMA TYPE (H): ICD-10-CM

## 2023-09-11 PROBLEM — R73.03 PREDIABETES: Status: RESOLVED | Noted: 2022-05-27 | Resolved: 2023-09-11

## 2023-09-11 PROCEDURE — 99214 OFFICE O/P EST MOD 30 MIN: CPT | Mod: 25 | Performed by: FAMILY MEDICINE

## 2023-09-11 PROCEDURE — 82570 ASSAY OF URINE CREATININE: CPT | Performed by: FAMILY MEDICINE

## 2023-09-11 PROCEDURE — G0439 PPPS, SUBSEQ VISIT: HCPCS | Performed by: FAMILY MEDICINE

## 2023-09-11 PROCEDURE — 82043 UR ALBUMIN QUANTITATIVE: CPT | Performed by: FAMILY MEDICINE

## 2023-09-11 RX ORDER — ALBUTEROL SULFATE 90 UG/1
AEROSOL, METERED RESPIRATORY (INHALATION)
Qty: 54 G | Refills: 2 | Status: SHIPPED | OUTPATIENT
Start: 2023-09-11 | End: 2023-10-25

## 2023-09-11 RX ORDER — ATORVASTATIN CALCIUM 20 MG/1
20 TABLET, FILM COATED ORAL DAILY
Qty: 100 TABLET | Refills: 2 | Status: SHIPPED | OUTPATIENT
Start: 2023-09-11 | End: 2024-05-30

## 2023-09-11 RX ORDER — AMLODIPINE BESYLATE 5 MG/1
5 TABLET ORAL DAILY
Qty: 100 TABLET | Refills: 1 | Status: SHIPPED | OUTPATIENT
Start: 2023-09-11 | End: 2024-03-22

## 2023-09-11 ASSESSMENT — PAIN SCALES - GENERAL: PAINLEVEL: NO PAIN (0)

## 2023-09-11 ASSESSMENT — ACTIVITIES OF DAILY LIVING (ADL): CURRENT_FUNCTION: NO ASSISTANCE NEEDED

## 2023-09-11 NOTE — PROGRESS NOTES
"SUBJECTIVE:   Kelsea is a 78 year old who presents for Preventive Visit.      9/11/2023     2:12 PM   Additional Questions   Roomed by KARLENE Nunez       Are you in the first 12 months of your Medicare coverage?  No    Healthy Habits:     In general, how would you rate your overall health?  Good    Frequency of exercise:  None    Do you usually eat at least 4 servings of fruit and vegetables a day, include whole grains    & fiber and avoid regularly eating high fat or \"junk\" foods?  No    Taking medications regularly:  Yes    Medication side effects:  Not applicable    Ability to successfully perform activities of daily living:  No assistance needed    Home Safety:  No safety concerns identified    Hearing Impairment:  No hearing concerns    In the past 6 months, have you been bothered by leaking of urine? Yes    In general, how would you rate your overall mental or emotional health?  Good    Additional concerns today:  No        Have you ever done Advance Care Planning? (For example, a Health Directive, POLST, or a discussion with a medical provider or your loved ones about your wishes): No, advance care planning information given to patient to review.  Patient declined advance care planning discussion at this time.       Fall risk  Fallen 2 or more times in the past year?: No  Any fall with injury in the past year?: No    Cognitive Screening Normal cognition based on my direct observation during interview and exam.     Do you have sleep apnea, excessive snoring or daytime drowsiness? : no    Reviewed and updated as needed this visit by clinical staff   Tobacco  Allergies  Meds  Problems  Med Hx  Surg Hx  Fam Hx          Reviewed and updated as needed this visit by Provider   Tobacco  Allergies  Meds  Problems  Med Hx  Surg Hx  Fam Hx         Social History     Tobacco Use    Smoking status: Some Days     Packs/day: 1.00     Years: 51.00     Pack years: 51.00     Types: Cigarettes     Start date: " 1/15/1968    Smokeless tobacco: Never    Tobacco comments:      smokes   Substance Use Topics    Alcohol use: No             9/11/2023     2:10 PM   Alcohol Use   Prescreen: >3 drinks/day or >7 drinks/week? No     Do you have a current opioid prescription? No  Do you use any other controlled substances or medications that are not prescribed by a provider? None              Current providers sharing in care for this patient include:   Patient Care Team:  Maritza Lopez MD as PCP - General (Family Practice)  Maritza Lopez MD as Assigned PCP  Joshua Coe DO as Assigned Nephrology Provider  Davi Mosquera MD as Assigned Surgical Provider    The following health maintenance items are reviewed in Epic and correct as of today:  Health Maintenance   Topic Date Due    ANNUAL REVIEW OF HM ORDERS  Never done    COVID-19 Vaccine (4 - Pfizer series) 04/06/2022    MICROALBUMIN  08/24/2022    INFLUENZA VACCINE (1) 09/01/2023    MEDICARE ANNUAL WELLNESS VISIT  09/06/2023    BMP  03/06/2024    A1C  09/06/2024    LIPID  09/06/2024    HEMOGLOBIN  09/06/2024    NICOTINE/TOBACCO CESSATION COUNSELING Q 1 YR  09/11/2024    FALL RISK ASSESSMENT  09/11/2024    ADVANCE CARE PLANNING  09/11/2028    DEXA  09/30/2030    DTAP/TDAP/TD IMMUNIZATION (3 - Td or Tdap) 08/31/2031    SPIROMETRY  Completed    HEPATITIS C SCREENING  Completed    COPD ACTION PLAN  Completed    PHQ-2 (once per calendar year)  Completed    Pneumococcal Vaccine: 65+ Years  Completed    URINALYSIS  Completed    ZOSTER IMMUNIZATION  Completed    IPV IMMUNIZATION  Aged Out    HPV IMMUNIZATION  Aged Out    MENINGITIS IMMUNIZATION  Aged Out    MAMMO SCREENING  Discontinued    COLORECTAL CANCER SCREENING  Discontinued    LUNG CANCER SCREENING  Discontinued       G 1 P 1   No LMP recorded. Patient is postmenopausal.     Fasting: No   Td:td 8/2021       Flu: 9/2022      Covid: mono boost #1 29/2022      Shingrix: done      PPV: done      NO - age  67 - see link Cervical Cytology Screening Guidelines               Cholesterol:   Lab Results   Component Value Date    CHOL 168 09/06/2023    CHOL 186 02/15/2021     Lab Results   Component Value Date    HDL 44 09/06/2023    HDL 47 02/15/2021     Lab Results   Component Value Date    LDL 86 09/06/2023     02/15/2021     Lab Results   Component Value Date    TRIG 190 09/06/2023    TRIG 191 02/15/2021     Lab Results   Component Value Date    CHOLHDLRATIO 4.6 03/18/2015         MMG: no further screening  Dexa:  declines     Flex/colo: declines      Seat Belt: Yes    Sunscreen use: Yes   Calcium Intake: adeq  Health Care Directive: No  Sexually Active: No    Current contraception: none  History of abnormal Pap smear: Yes: see pmh  Family history of colon/breast/ovarian cancer: Yes: see [facility] [place]  Regular self breast exam: Yes  History of abnormal mammogram: No      BP Readings from Last 3 Encounters:   09/11/23 133/75   05/22/23 126/56   05/08/23 99/64    Wt Readings from Last 3 Encounters:   09/11/23 76.7 kg (169 lb)   05/08/23 75.6 kg (166 lb 9.6 oz)   02/06/23 75.3 kg (166 lb)                  Patient Active Problem List   Diagnosis    Hyperlipidemia LDL goal <100    Hypertensive hypertrophic cardiomyopathy (H)    Mitral valve insufficiency    Hypertension goal BP (blood pressure) < 140/90    CKD (chronic kidney disease) stage 3, GFR 30-59 ml/min (H)    GERD (gastroesophageal reflux disease)    COPD (chronic obstructive pulmonary disease) (H)    Abnormal glucose    Advanced directives, counseling/discussion    Pseudophakia, ou; Yag Caps, od    Benign neoplasm of colon, unspecified part of colon    Posterior vitreous detachment of both eyes    Prediabetes     Past Surgical History:   Procedure Laterality Date    CATARACT IOL, RT/LT      CHOLECYSTECTOMY, OPEN      COLONOSCOPY N/A 07/27/2015    Procedure: COLONOSCOPY;  Surgeon: Dilip Quintero MD;  Location: [facility] OR    COLONOSCOPY WITH CO2 INSUFFLATION N/A  07/27/2015    Procedure: COLONOSCOPY WITH CO2 INSUFFLATION;  Surgeon: Dilip Quintero MD;  Location: [facility] OR    HC TOOTH EXTRACTION W/FORCEP      false teeth    LASER YAG CAPSULOTOMY Right 01/04/2022    NON-SURGICAL SEPTAL REDUCTION THERAPY W/ CORONARY ARTERIOGRAMS, W/WO TEMPORARY PACEMAKER      PHACOEMULSIFICATION WITH STANDARD INTRAOCULAR LENS IMPLANT  03/2017; 3/2017    left eye; right eye    SURGICAL HISTORY OF -   04/2010    mitral valvuloplasty [place]       Social History     Tobacco Use    Smoking status: Some Days     Packs/day: 1.00     Years: 51.00     Pack years: 51.00     Types: Cigarettes     Start date: 1/15/1968    Smokeless tobacco: Never    Tobacco comments:      smokes   Substance Use Topics    Alcohol use: No     Family History   Problem Relation Age of Onset    Cancer Mother         stomach,bone    Alcohol/Drug Mother         smoker    C.A.D. Father     Cancer Father     Alcohol/Drug Father         smoker    Hypertension Father     Heart Disease Sister     Lipids Daughter     Heart Disease Sister     Breast Cancer Sister 64    Other Cancer Sister         bone , kidney    Brain Cancer Sister     Cerebrovascular Disease Brother     Cardiovascular Brother     Diabetes Brother     Hypertension Brother     Hypertension Brother     Cancer - colorectal Daughter 41    Thyroid Disease No family hx of     Glaucoma No family hx of     Macular Degeneration No family hx of          Current Outpatient Medications   Medication Sig Dispense Refill    albuterol (PROAIR HFA/PROVENTIL HFA/VENTOLIN HFA) 108 (90 Base) MCG/ACT inhaler USE 2 INHALATIONS BY MOUTH  INTO THE LUNGS EVERY 6  HOURS 54 g 2    amLODIPine (NORVASC) 5 MG tablet Take 1 tablet (5 mg) by mouth daily 100 tablet 1    atorvastatin (LIPITOR) 20 MG tablet Take 1 tablet (20 mg) by mouth daily 100 tablet 2    CALCIUM 600/VITAMIN D OR 1 everyday      Cholecalciferol (VITAMIN D) 1000 UNIT capsule Take 1 capsule by mouth daily.      CLARITIN 10 MG  "OR TABS 1 TABLET DAILY      FISH OIL OR one everyday      GLUCOSAMINE 1500 COMPLEX OR one everyday      irbesartan-hydrochlorothiazide (AVALIDE) 300-12.5 MG tablet       metoprolol succinate ER (TOPROL XL) 100 MG 24 hr tablet TAKE 1 TABLET BY MOUTH DAILY 100 tablet 2    omeprazole 20 MG tablet Take 1 tablet by mouth daily. Take 30-60 minutes before a meal. 30 tablet 1    ONE-A-DAY WEIGHT SMART ADVANCE OR 1 every other day      TYLENOL CAPS 500 MG OR 1 CAPSULE EVERY 4 HOURS AS NEEDED      umeclidinium (INCRUSE ELLIPTA) 62.5 MCG/ACT inhaler Inhale 1 puff into the lungs daily 100 each 2    VITAMIN C OR 1 everyday       Recent Labs   Lab Test 09/06/23  0712 04/21/23  1345 09/06/22  1553 08/29/22  0815 02/07/22  1356 08/31/21  0822 08/24/21  0853 02/15/21  0826 10/05/20  0858   A1C 6.5*  --   --  6.3*  --  6.3*  --  6.4*  --    LDL 86  --   --  73  --   --  101* 101*  --    HDL 44*  --   --  50  --   --  49* 47*  --    TRIG 190*  --   --  132  --   --  187* 191*  --    ALT  --   --   --   --   --   --  26  --   --    CR 1.08* 1.19*   < > 1.02   < >  --  1.07* 1.11* 0.99   GFRESTIMATED 53* 47*   < > 57*   < >  --  51* 49* 56*   GFRESTBLACK  --   --   --   --   --   --   --  57* 65   POTASSIUM 4.2 4.5   < > 4.5   < >  --  4.7 4.7 4.6    < > = values in this interval not displayed.          Mammogram Screening - Patient over age 75, has elected to discontinue screenings.  Pertinent mammograms are reviewed under the imaging tab.    Review of Systems  Constitutional, HEENT, cardiovascular, pulmonary, gi and gu systems are negative, except as otherwise noted.    OBJECTIVE:   /75   Pulse 55   Temp 98.1  F (36.7  C) (Oral)   Resp 16   Ht 1.581 m (5' 2.25\")   Wt 76.7 kg (169 lb)   SpO2 94%   BMI 30.66 kg/m   Estimated body mass index is 30.66 kg/m  as calculated from the following:    Height as of this encounter: 1.581 m (5' 2.25\").    Weight as of this encounter: 76.7 kg (169 lb).  Physical Exam  GENERAL APPEARANCE: " healthy, alert and no distress  EYES: Eyes grossly normal to inspection, PERRL and conjunctivae and sclerae normal  HENT: ear canals and TM's normal, nose and mouth without ulcers or lesions, oropharynx clear and oral mucous membranes moist  NECK: no adenopathy, no asymmetry, masses, or scars and thyroid normal to palpation  RESP: lungs clear to auscultation - no rales, rhonchi or wheezes  BREAST: normal without masses, tenderness or nipple discharge and no palpable axillary masses or adenopathy  CV: regular rate and rhythm, normal S1 S2, no S3 or S4, no murmur, click or rub, no peripheral edema and peripheral pulses strong  ABDOMEN: soft, nontender, no hepatosplenomegaly, no masses and bowel sounds normal  MS: no musculoskeletal defects are noted and gait is age appropriate without ataxia  SKIN: no suspicious lesions or rashes  NEURO: Normal strength and tone, sensory exam grossly normal, mentation intact and speech normal  DIABETIC FOOT EXAM: normal DP and PT pulses, no trophic changes or ulcerative lesions, normal sensory exam, and normal monofilament exam  PSYCH: mentation appears normal and affect normal/bright    Diagnostic Test Results:  Labs reviewed in Epic    ASSESSMENT / PLAN:   (Z00.00) Encounter for Medicare annual wellness exam  (primary encounter diagnosis)  Comment: preventive needs reviewed   Plan: see orders in Epic.     (E11.22,  N18.31) Type 2 diabetes mellitus with stage 3a chronic kidney disease, without long-term current use of insulin (H)  Comment: A1c 6.5  Plan: no meds at this time, continue to monitor   Follow-up 6 months for labs    (J43.9) Pulmonary emphysema, unspecified emphysema type (H)  Comment: stable, trelegy too expensive  Plan: albuterol (PROAIR HFA/PROVENTIL HFA/VENTOLIN         HFA) 108 (90 Base) MCG/ACT inhaler,         umeclidinium (INCRUSE ELLIPTA) 62.5 MCG/ACT         inhaler         Refill x 6 months     (I11.9,  I42.2) Hypertensive hypertrophic cardiomyopathy, without  heart failure (H)  Comment: stable  Plan: amLODIPine (NORVASC) 5 MG tablet, atorvastatin         (LIPITOR) 20 MG tablet        Follows with cardiology    (I10) Hypertension goal BP (blood pressure) < 140/90  (N18.31) Stage 3a chronic kidney disease (H)  Comment: to goal  Plan: amLODIPine (NORVASC) 5 MG tablet         Refill x 6 months              COUNSELING:  Reviewed preventive health counseling, as reflected in patient instructions  Special attention given to:       Regular exercise       Healthy diet/nutrition        She reports that she has been smoking cigarettes. She started smoking about 55 years ago. She has a 51.00 pack-year smoking history. She has never used smokeless tobacco.  Nicotine/Tobacco Cessation Plan:   Information offered: Patient not interested at this time      Appropriate preventive services were discussed with this patient, including applicable screening as appropriate for cardiovascular disease, diabetes, osteopenia/osteoporosis, and glaucoma.  As appropriate for age/gender, discussed screening for colorectal cancer, prostate cancer, breast cancer, and cervical cancer. Checklist reviewing preventive services available has been given to the patient.    Reviewed patients plan of care and provided an AVS. The Intermediate Care Plan ( asthma action plan, low back pain action plan, and migraine action plan) for Kelsea meets the Care Plan requirement. This Care Plan has been established and reviewed with the Patient.          Maritza Lopez MD  [facility]    Identified Health Risks:  I have reviewed Opioid Use Disorder and Substance Use Disorder risk factors and made any needed referrals.

## 2023-09-11 NOTE — PATIENT INSTRUCTIONS
Patient Education   Personalized Prevention Plan  You are due for the preventive services outlined below.  Your care team is available to assist you in scheduling these services.  If you have already completed any of these items, please share that information with your care team to update in your medical record.  Health Maintenance Due   Topic Date Due     ANNUAL REVIEW OF HM ORDERS  Never done     COVID-19 Vaccine (4 - Pfizer series) 04/06/2022     Kidney Microalbumin Urine Test  08/24/2022     Flu Vaccine (1) 09/01/2023     FALL RISK ASSESSMENT  09/06/2023     Talk to your care team about options to quit tobacco use.  09/06/2023     Annual Wellness Visit  09/06/2023

## 2023-09-11 NOTE — LETTER
September 12, 2023      Kelsea Valentine  [id number]   [place] [address]        Kelsea,     Be sure to follow-up with Dr. Coe as scheduled.  You are leaking more protein in your urine.     Resulted Orders   Albumin Random Urine Quantitative with Creat Ratio   Result Value Ref Range    Creatinine Urine mg/dL 20.3 mg/dL      Comment:      The reference ranges have not been established in urine creatinine. The results should be integrated into the clinical context for interpretation.    Albumin Urine mg/L 164.0 mg/L      Comment:      The reference ranges have not been established in urine albumin. The results should be integrated into the clinical context for interpretation.    Albumin Urine mg/g Cr 807.88 (H) 0.00 - 25.00 mg/g Cr      Comment:      Microalbuminuria is defined as an albumin:creatinine ratio of 17 to 299 for males and 25 to 299 for females. A ratio of albumin:creatinine of 300 or higher is indicative of overt proteinuria.  Due to biologic variability, positive results should be confirmed by a second, first-morning random or 24-hour timed urine specimen. If there is discrepancy, a third specimen is recommended. When 2 out of 3 results are in the microalbuminuria range, this is evidence for incipient nephropathy and warrants increased efforts at glucose control, blood pressure control, and institution of therapy with an angiotensin-converting-enzyme (ACE) inhibitor (if the patient can tolerate it).         If you have any questions or concerns, please call the clinic at the number listed above.     Sincerely,        Maritza Lopez MD/bmc

## 2023-09-12 LAB
CREAT UR-MCNC: 20.3 MG/DL
MICROALBUMIN UR-MCNC: 164 MG/L
MICROALBUMIN/CREAT UR: 807.88 MG/G CR (ref 0–25)

## 2023-09-14 NOTE — TELEPHONE ENCOUNTER
KARLENE Patino sent lab results and result note in out going mail per her charting.  Judith, REBECA Care Coordinator  Nephrology

## 2023-10-04 ENCOUNTER — NURSE TRIAGE (OUTPATIENT)
Dept: FAMILY MEDICINE | Facility: CLINIC | Age: 78
End: 2023-10-04
Payer: COMMERCIAL

## 2023-10-04 NOTE — TELEPHONE ENCOUNTER
"Nurse Triage SBAR    Is this a 2nd Level Triage? NO    Situation: Patient calling to report cold symptoms and requesting to schedule appointment with provider.    Background: Patient's symptoms started on Saturday. She had to call in to work due to her symptoms. Her  was recently sick and he is currently feeling better. She believes she got the illness from him. She has not taken a Covid test at home yet as she does not have one available.    Assessment: Patient reports current symptoms are cough, congestion, chills, and mild SOB when she's up and walking around. Her cough sounds wet but states she is not coughing up any phlegm. She denies having any chest pain, dizziness, nausea, emesis, diarrhea, or severe difficulty breathing. She has been using OTC meds to help manage her symptoms. She reports that it has been helpful somewhat.    Protocol Recommended Disposition:   See in Office Today or Tomorrow    Recommendation: Patient scheduled for telephone call 10/5 to further discuss her symptoms with a provider. RN advised for patient to be seen sooner in UC/ED for new or worsening symptoms. Patient in agreement with plan. No further questions or concerns.     REBECA Proctor  [facility] Primary [facility] Triage      Reason for Disposition   Patient wants to be seen    Answer Assessment - Initial Assessment Questions  1. ONSET: \"When did the nasal discharge start?\"       Symptoms started Saturday  2. AMOUNT: \"How much discharge is there?\"       Congested  3. COUGH: \"Do you have a cough?\" If Yes, ask: \"Describe the color of your sputum\" (clear, white, yellow, green)      Yes, not coughing up phlegm yet  4. RESPIRATORY DISTRESS: \"Describe your breathing.\"       Mild SOB/wheezing only when up and moving around  5. FEVER: \"Do you have a fever?\" If Yes, ask: \"What is your temperature, how was it measured, and when did it start?\"      No  6. SEVERITY: \"Overall, how bad are you feeling right now?\" (e.g., doesn't " "interfere with normal activities, staying home from school/work, staying in bed)       Staying home from work, seems like symptoms are not getting better  7. OTHER SYMPTOMS: \"Do you have any other symptoms?\" (e.g., sore throat, earache, wheezing, vomiting)      No  8. PREGNANCY: \"Is there any chance you are pregnant?\" \"When was your last menstrual period?\"      No    Protocols used: Common Cold-A-OH    " Spine appears normal, range of motion is not limited, no muscle or joint tenderness

## 2023-10-05 ENCOUNTER — VIRTUAL VISIT (OUTPATIENT)
Dept: FAMILY MEDICINE | Facility: CLINIC | Age: 78
End: 2023-10-05
Payer: COMMERCIAL

## 2023-10-05 DIAGNOSIS — J44.1 CHRONIC OBSTRUCTIVE PULMONARY DISEASE WITH ACUTE EXACERBATION (H): Primary | ICD-10-CM

## 2023-10-05 DIAGNOSIS — Z23 NEED FOR VACCINATION: ICD-10-CM

## 2023-10-05 DIAGNOSIS — J06.9 VIRAL URI WITH COUGH: ICD-10-CM

## 2023-10-05 PROCEDURE — 99213 OFFICE O/P EST LOW 20 MIN: CPT | Mod: 95 | Performed by: FAMILY MEDICINE

## 2023-10-05 RX ORDER — PREDNISONE 20 MG/1
20 TABLET ORAL 2 TIMES DAILY WITH MEALS
Qty: 12 TABLET | Refills: 0 | Status: SHIPPED | OUTPATIENT
Start: 2023-10-05 | End: 2023-10-11

## 2023-10-05 NOTE — PROGRESS NOTES
Kelsea is a 78 year old who is being evaluated via a billable telephone visit.      What phone number would you like to be contacted at? [phone]   How would you like to obtain your AVS? Mail a copy  Distant Location (provider location):  On-site    Assessment & Plan     (J44.1) Chronic obstructive pulmonary disease with acute exacerbation (H)  (primary encounter diagnosis)  Comment: Secondary to viral URI, which could be COVID-19.   Plan: predniSONE (DELTASONE) 20 MG tablet        follow up if lower respiratory symptoms persist past 6 days.     (J06.9) Viral URI with cough  Comment: If this is COVID-19, it is not severe disease. Unfortunately it is too late to treat if it were.   Plan: Return in about 4 days (around 10/9/2023) for recheck if symptoms fail to resolve by then, or sooner if symptoms worsen.      (Z23) Need for vaccination  Comment:   Plan: Recommend updating Influenza and COVID-19 vaccines when she is well.       16 minutes spent by me on the date of the encounter doing chart review, patient visit, and documentation          Chris Carvalho MD  [facility]   Kelsea is a 78 year old, presenting for the following health issues:  URI        9/11/2023     2:12 PM   Additional Questions   Roomed by KARLENE Nunez       History of Present Illness       Reason for visit:  URI    She eats 0-1 servings of fruits and vegetables daily.She consumes 0 sweetened beverage(s) daily.She exercises with enough effort to increase her heart rate 9 or less minutes per day.  She exercises with enough effort to increase her heart rate 3 or less days per week.   She is taking medications regularly.       Acute Illness  Acute illness concerns: URI  Onset/Duration: Saturday 9/30/2023  Symptoms:  Fever: No  Chills/Sweats: No  Headache (location?): No  Sinus Pressure: YES  Conjunctivitis:  No  Ear Pain: no  Rhinorrhea: No  Congestion: YES  Sore Throat: No  Cough: YES-productive of  clear sputum, productive of yellow sputum  Wheeze: No  Decreased Appetite: No  Nausea: No  Vomiting: No  Diarrhea: No  Dysuria/Freq.: No  Dysuria or Hematuria: No  Fatigue/Achiness: No  Sick/Strep Exposure: No  Therapies tried and outcome: Robitussin    Patient reports she missed work yesterday due to shortness of breath. She tried sinus tablets last night with some improvement. She notes she has not been around anyone with COVID-19. Patient states her  has also been having the same symptoms.         Review of Systems         Objective           Vitals:  No vitals were obtained today due to virtual visit.    Physical Exam   healthy, alert, and no distress  PSYCH: Alert and oriented times 3; coherent speech, normal   rate and volume, able to articulate logical thoughts, able   to abstract reason, no tangential thoughts, no hallucinations   or delusions  Her affect is normal and pleasant  RESP: No cough, no audible wheezing, able to talk in full sentences  Remainder of exam unable to be completed due to telephone visits                Phone call duration: 6 minutes    This document serves as a record of the services and decisions personally performed and made by Dr. Carvalho. It was created on his behalf by Aaron Lares, a trained medical scribe. The creation of this document is based the provider's statements to the medical scribe.  Aaron Lares,  7:18 AM

## 2023-10-05 NOTE — PATIENT INSTRUCTIONS
Kelsea,     I recommend you update your Influenza and COVID-19 vaccines about 2 weeks after your illness is done.     Dr Carvalho

## 2023-10-19 DIAGNOSIS — J43.9 PULMONARY EMPHYSEMA, UNSPECIFIED EMPHYSEMA TYPE (H): ICD-10-CM

## 2023-10-19 RX ORDER — FLUTICASONE FUROATE, UMECLIDINIUM BROMIDE AND VILANTEROL TRIFENATATE 100; 62.5; 25 UG/1; UG/1; UG/1
POWDER RESPIRATORY (INHALATION)
Refills: 5 | OUTPATIENT
Start: 2023-10-19

## 2023-10-25 ENCOUNTER — OFFICE VISIT (OUTPATIENT)
Dept: FAMILY MEDICINE | Facility: CLINIC | Age: 78
End: 2023-10-25
Payer: COMMERCIAL

## 2023-10-25 ENCOUNTER — TELEPHONE (OUTPATIENT)
Dept: FAMILY MEDICINE | Facility: CLINIC | Age: 78
End: 2023-10-25

## 2023-10-25 VITALS
BODY MASS INDEX: 29.63 KG/M2 | RESPIRATION RATE: 22 BRPM | SYSTOLIC BLOOD PRESSURE: 123 MMHG | WEIGHT: 161 LBS | HEIGHT: 62 IN | TEMPERATURE: 97.6 F | HEART RATE: 62 BPM | DIASTOLIC BLOOD PRESSURE: 73 MMHG | OXYGEN SATURATION: 93 %

## 2023-10-25 DIAGNOSIS — J43.9 PULMONARY EMPHYSEMA, UNSPECIFIED EMPHYSEMA TYPE (H): ICD-10-CM

## 2023-10-25 DIAGNOSIS — J18.9 PNEUMONIA OF RIGHT LOWER LOBE DUE TO INFECTIOUS ORGANISM: ICD-10-CM

## 2023-10-25 DIAGNOSIS — E87.1 HYPONATREMIA: ICD-10-CM

## 2023-10-25 DIAGNOSIS — Z09 HOSPITAL DISCHARGE FOLLOW-UP: Primary | ICD-10-CM

## 2023-10-25 DIAGNOSIS — I10 HYPERTENSION GOAL BP (BLOOD PRESSURE) < 140/90: ICD-10-CM

## 2023-10-25 LAB
ANION GAP SERPL CALCULATED.3IONS-SCNC: 9 MMOL/L (ref 7–15)
BUN SERPL-MCNC: 21.7 MG/DL (ref 8–23)
CALCIUM SERPL-MCNC: 9.6 MG/DL (ref 8.8–10.2)
CHLORIDE SERPL-SCNC: 101 MMOL/L (ref 98–107)
CREAT SERPL-MCNC: 1.23 MG/DL (ref 0.51–0.95)
DEPRECATED HCO3 PLAS-SCNC: 25 MMOL/L (ref 22–29)
EGFRCR SERPLBLD CKD-EPI 2021: 45 ML/MIN/1.73M2
GLUCOSE SERPL-MCNC: 131 MG/DL (ref 70–99)
POTASSIUM SERPL-SCNC: 5.5 MMOL/L (ref 3.4–5.3)
SODIUM SERPL-SCNC: 135 MMOL/L (ref 135–145)

## 2023-10-25 PROCEDURE — 36415 COLL VENOUS BLD VENIPUNCTURE: CPT | Performed by: FAMILY MEDICINE

## 2023-10-25 PROCEDURE — 99214 OFFICE O/P EST MOD 30 MIN: CPT | Performed by: FAMILY MEDICINE

## 2023-10-25 PROCEDURE — 80048 BASIC METABOLIC PNL TOTAL CA: CPT | Performed by: FAMILY MEDICINE

## 2023-10-25 RX ORDER — IRBESARTAN 300 MG/1
300 TABLET ORAL
COMMUNITY
Start: 2023-10-13 | End: 2023-10-25 | Stop reason: ALTCHOICE

## 2023-10-25 RX ORDER — SPIRONOLACTONE 25 MG/1
25 TABLET ORAL DAILY
Qty: 100 TABLET | Refills: 1 | Status: SHIPPED | OUTPATIENT
Start: 2023-10-25 | End: 2024-03-22

## 2023-10-25 RX ORDER — RESPIRATORY SYNCYTIAL VIRUS VACCINE 120MCG/0.5
0.5 KIT INTRAMUSCULAR ONCE
Qty: 1 EACH | Refills: 0 | Status: CANCELLED | OUTPATIENT
Start: 2023-10-25 | End: 2023-10-25

## 2023-10-25 RX ORDER — ALBUTEROL SULFATE 90 UG/1
AEROSOL, METERED RESPIRATORY (INHALATION)
Qty: 54 G | Refills: 2 | Status: SHIPPED | OUTPATIENT
Start: 2023-10-25 | End: 2024-09-12

## 2023-10-25 RX ORDER — SPIRONOLACTONE 25 MG/1
1 TABLET ORAL DAILY
COMMUNITY
Start: 2023-10-13 | End: 2023-10-25

## 2023-10-25 ASSESSMENT — ANXIETY QUESTIONNAIRES
6. BECOMING EASILY ANNOYED OR IRRITABLE: SEVERAL DAYS
GAD7 TOTAL SCORE: 1
4. TROUBLE RELAXING: NOT AT ALL
IF YOU CHECKED OFF ANY PROBLEMS ON THIS QUESTIONNAIRE, HOW DIFFICULT HAVE THESE PROBLEMS MADE IT FOR YOU TO DO YOUR WORK, TAKE CARE OF THINGS AT HOME, OR GET ALONG WITH OTHER PEOPLE: NOT DIFFICULT AT ALL
GAD7 TOTAL SCORE: 1
5. BEING SO RESTLESS THAT IT IS HARD TO SIT STILL: NOT AT ALL
3. WORRYING TOO MUCH ABOUT DIFFERENT THINGS: NOT AT ALL
2. NOT BEING ABLE TO STOP OR CONTROL WORRYING: NOT AT ALL
7. FEELING AFRAID AS IF SOMETHING AWFUL MIGHT HAPPEN: NOT AT ALL
1. FEELING NERVOUS, ANXIOUS, OR ON EDGE: NOT AT ALL

## 2023-10-25 ASSESSMENT — PAIN SCALES - GENERAL: PAINLEVEL: NO PAIN (0)

## 2023-10-25 NOTE — PATIENT INSTRUCTIONS
Stop Avapro and continue on spironolactone    Notify Maritza Lopez MD if diarrhea does not continue to improve over the next 2 weeks.      Get your flu/covid/RSV shots within 2 weeks

## 2023-10-25 NOTE — TELEPHONE ENCOUNTER
Patient Returning Call    Reason for call:  Patient returning Dr. Lopez's telephone call.    Patient has additional questions:  Yes    What are your questions/concerns:  Patient was sleeping when provider called.  Does not know the reason for the telephone call.    Who does the patient want to speak with:  Provider    Is an  needed?:  No      Okay to leave a detailed message?: Yes at Cell number on file:    Telephone Information:   Mobile [phone]

## 2023-10-25 NOTE — PROGRESS NOTES
"  Assessment & Plan     (Z09) Hospital discharge follow-up  (primary encounter diagnosis)  (J18.9) Pneumonia of right lower lobe due to infectious organism  Comment: improved  Plan: XR Chest 2 Views        Plan for pt to follow-up for cxr 6 weeks from hospitalization  Reviewed s/sx requiring immediate evaluation.     (E87.1) Hyponatremia  Comment: due for recheck  Plan: spironolactone (ALDACTONE) 25 MG tablet        BMP today, adjust meds if needed.    (J43.9) Pulmonary emphysema, unspecified emphysema type (H)  Comment: stable  Plan: albuterol (PROAIR HFA/PROVENTIL HFA/VENTOLIN         HFA) 108 (90 Base) MCG/ACT inhaler, XR Chest 2         Views        Needs refill of albuterol    (I10) Hypertension goal BP (blood pressure) < 140/90  Comment: to goal  Plan: Basic metabolic panel  (Ca, Cl, CO2, Creat,         Gluc, K, Na, BUN), spironolactone (ALDACTONE)         25 MG tablet        Risk of hyperkalemia on arb and spironolactone           MED REC REQUIRED  Post Medication Reconciliation Status: discharge medications reconciled and changed, per note/orders  BMI:   Estimated body mass index is 29.21 kg/m  as calculated from the following:    Height as of this encounter: 1.581 m (5' 2.25\").    Weight as of this encounter: 73 kg (161 lb).       See Patient Instructions    Maritza Lopez MD  [facility] MORRO Enamorado is a 78 year old, presenting for the following health issues:  Hospital F/U and Recheck Medication      10/25/2023     7:32 AM   Additional Questions   Roomed by mima   Accompanied by self         10/25/2023     7:32 AM   Patient Reported Additional Medications   Patient reports taking the following new medications see chart       History of Present Illness       Mental Health Follow-up:  Patient presents to follow-up on Anxiety.    Patient's anxiety since last visit has been:  No change  The patient is not having other symptoms associated with anxiety.  Any significant " "life events: No  Patient is not feeling anxious or having panic attacks.  Patient has no concerns about alcohol or drug use.    Reason for visit:  Follow up and meds  Symptom onset:  Today    She eats 0-1 servings of fruits and vegetables daily.She consumes 2 sweetened beverage(s) daily.She exercises with enough effort to increase her heart rate 9 or less minutes per day.  She exercises with enough effort to increase her heart rate 3 or less days per week.   She is taking medications regularly.           Hospital Follow-up Visit:    [facility]  Date of Admission: 10/12/23  Date of Discharge: 10/13/23  Reason(s) for Admission: pneumonia    Was your hospitalization related to COVID-19? No   Problems taking medications regularly:  None  Medication changes since discharge: None  Problems adhering to non-medication therapy:  None    Summary of hospitalization:  CareEverywhere information obtained and reviewed  Diagnostic Tests/Treatments reviewed.  Follow up needed: cxr in 6 weeks  Other Healthcare Providers Involved in Patient s Care:         None  Update since discharge: improved.     Had hyponatremia as well.  Hospital provider stopped hydrochlorothiazide, changed to spironolactone though kept the avapro.  Pt due for follow-up bmp.  Bp well controlled on current regimen.  Cough improved and no shortness of breath.       Plan of care communicated with patient                   Review of Systems   Constitutional, HEENT, cardiovascular, pulmonary, gi and gu systems are negative, except as otherwise noted.      Objective    /73   Pulse 62   Temp 97.6  F (36.4  C) (Oral)   Resp 22   Ht 1.581 m (5' 2.25\")   Wt 73 kg (161 lb)   SpO2 93%   BMI 29.21 kg/m    Body mass index is 29.21 kg/m .  Physical Exam   GENERAL: alert, no distress, and elderly  EYES: Eyes grossly normal to inspection, PERRL and conjunctivae and sclerae normal  RESP: lungs clear to auscultation - no rales, " rhonchi or wheezes  CV: regular rate and rhythm, normal S1 S2, no S3 or S4, no murmur, click or rub, no peripheral edema and peripheral pulses strong  MS: no gross musculoskeletal defects noted, no edema  SKIN: no suspicious lesions or rashes  PSYCH: mentation appears normal, affect normal/bright

## 2023-12-11 ENCOUNTER — ANCILLARY PROCEDURE (OUTPATIENT)
Dept: GENERAL RADIOLOGY | Facility: CLINIC | Age: 78
End: 2023-12-11
Attending: FAMILY MEDICINE
Payer: COMMERCIAL

## 2023-12-11 DIAGNOSIS — J18.9 PNEUMONIA OF RIGHT LOWER LOBE DUE TO INFECTIOUS ORGANISM: ICD-10-CM

## 2023-12-11 DIAGNOSIS — J43.9 PULMONARY EMPHYSEMA, UNSPECIFIED EMPHYSEMA TYPE (H): ICD-10-CM

## 2023-12-11 PROCEDURE — 71046 X-RAY EXAM CHEST 2 VIEWS: CPT | Mod: TC | Performed by: RADIOLOGY

## 2023-12-27 RX ORDER — IRBESARTAN AND HYDROCHLOROTHIAZIDE 300; 12.5 MG/1; MG/1
TABLET, FILM COATED ORAL
OUTPATIENT
Start: 2023-12-27

## 2023-12-27 NOTE — TELEPHONE ENCOUNTER
Hydrochlorothiazide stopped 10/2023 after hospitalization.  Ibesartan stopped due to hyperkalemia on ARB and spironolactone.    See 10/25/2023 tel encounter and OV note.

## 2024-01-12 DIAGNOSIS — N18.31 STAGE 3A CHRONIC KIDNEY DISEASE (H): Primary | ICD-10-CM

## 2024-01-23 ENCOUNTER — TELEPHONE (OUTPATIENT)
Dept: FAMILY MEDICINE | Facility: CLINIC | Age: 79
End: 2024-01-23
Payer: COMMERCIAL

## 2024-01-23 NOTE — TELEPHONE ENCOUNTER
Reason for Call:  Appointment Request    Patient requesting this type of appt:  Illness    Requested provider: Maritza Lopez    Reason patient unable to be scheduled: Not within requested timeframe    When does patient want to be seen/preferred time: 1-2 days    Comments: Pt states that she isn't feeling well and is unable to describe what she feeling. She was wondering if there is anyway she can be fit in tomorrow.    Okay to leave a detailed message?: No at Home number on file [phone] (home)    Call taken on 1/23/2024 at 1:54 PM by Deanna Underwood

## 2024-01-24 NOTE — TELEPHONE ENCOUNTER
I am not in clinic to see pt,  if another provider can see her that would be best or she can access urgent care.  Even if she is not having cough/congestion, she should test for Covid

## 2024-01-24 NOTE — TELEPHONE ENCOUNTER
Left message with male at pt home number for pt to call back.    RN please give pt message from Dr. VERONICA Lopez.  Stacia LOPEZN, RN

## 2024-01-25 NOTE — TELEPHONE ENCOUNTER
"Called and spoke with patient.  Symptoms started on Tuesday.  Headaches, upset or nauseous stomach, body aches, mild fever, dry cough.  Temperature was 99.5 today.  Neck and back are very achey.  Symptoms ebb and flow.  Diarrhea on Tuesday, but not since.    Has NOT taken home COVID test, doesn't have a test at home. When asked if there was a way to get a test from a pharmacy or if anyone could pick one up for her and drop off at home, patient replied \"No\".    Denies any shortness of breath, wheezing, trouble with breathing. Denies any pain or pressure in chest.     Has been drinking white soda to help with upset stomach. Eating soup.    Patient reluctant that she could have COVID, despite having very common and similar symptoms. \"I've had it before and I have all the vaccines\". Educated patient that can have COVID more than once and that vaccines don't prevent you from getting COVID.     Encouraged to test self at home to rule out as a cause of symptoms. Also informed patient how to get free home tests through the [facility] or through [email or web address] website, delivered via mail to home address.    Patient has an already scheduled visit with a primary care provider for tomorrow afternoon.  Encouraged patient to be seen sooner, today, and to walk-in to urgent care department. Patient declined, said \"no\", \"I'm going to keep scheduled visit tomorrow\". \"I'm okay and can wait\".  Writer did note that if patient does have COVID, there is a window of time, 5 days, to be able to prescribed anti-viral treatment, if wanted.    Instructed patient to call back to speak with nurse with any worsening symptoms.      Isabel Barksdale RN  Clinical Triage/Primary Care  [facility]                        "

## 2024-01-26 ENCOUNTER — OFFICE VISIT (OUTPATIENT)
Dept: FAMILY MEDICINE | Facility: CLINIC | Age: 79
End: 2024-01-26
Payer: COMMERCIAL

## 2024-01-26 VITALS
WEIGHT: 155 LBS | SYSTOLIC BLOOD PRESSURE: 114 MMHG | TEMPERATURE: 97.6 F | OXYGEN SATURATION: 91 % | BODY MASS INDEX: 28.52 KG/M2 | DIASTOLIC BLOOD PRESSURE: 64 MMHG | RESPIRATION RATE: 20 BRPM | HEIGHT: 62 IN | HEART RATE: 62 BPM

## 2024-01-26 DIAGNOSIS — J06.9 UPPER RESPIRATORY TRACT INFECTION, UNSPECIFIED TYPE: Primary | ICD-10-CM

## 2024-01-26 DIAGNOSIS — J44.1 CHRONIC OBSTRUCTIVE PULMONARY DISEASE WITH ACUTE EXACERBATION (H): ICD-10-CM

## 2024-01-26 LAB
FLUAV AG SPEC QL IA: NEGATIVE
FLUBV AG SPEC QL IA: NEGATIVE

## 2024-01-26 PROCEDURE — 87635 SARS-COV-2 COVID-19 AMP PRB: CPT | Performed by: PHYSICIAN ASSISTANT

## 2024-01-26 PROCEDURE — 99213 OFFICE O/P EST LOW 20 MIN: CPT | Performed by: PHYSICIAN ASSISTANT

## 2024-01-26 PROCEDURE — 87804 INFLUENZA ASSAY W/OPTIC: CPT | Performed by: PHYSICIAN ASSISTANT

## 2024-01-26 NOTE — LETTER
January 29, 2024      Kelsea Valentine  [address] [address]        Ms. Valentine,     Negative covid testing.     If any worsening or new concerns please be seen again.     Sincerely,   John Gutierrez PA-C     Resulted Orders   Influenza A & B Antigen - Clinic Collect   Result Value Ref Range    Influenza A antigen Negative Negative    Influenza B antigen Negative Negative    Narrative    Test results must be correlated with clinical data. If necessary, results should be confirmed by a molecular assay or viral culture.   Symptomatic COVID-19 Virus (Coronavirus) by PCR Nose   Result Value Ref Range    SARS CoV2 PCR Negative Negative      Comment:      NEGATIVE: SARS-CoV-2 (COVID-19) RNA not detected, presumed negative.    Narrative    Testing was performed using the didier SARS-CoV-2 assay on the didier  [employer] System. This test should be ordered for the detection of  SARS-CoV-2 in individuals who meet SARS-CoV-2 clinical and/or  epidemiological criteria. Test performance is unknown in asymptomatic  patients. This test is for in vitro diagnostic use under the FDA EUA  for laboratories certified under CLIA to perform high and/or moderate  complexity testing. This test has not been FDA cleared or approved. A  negative result does not rule out the presence of PCR inhibitors in  the specimen or target RNA in concentration below the limit of  detection for the assay. The possibility of a false negative should  be considered if the patient's recent exposure or clinical  presentation suggests COVID-19. This test was validated by the [facility]. This  laboratory is certified under the Clinical Laboratory Improvement  Amendments of 1988 (CLIA-88) as qualified to perform high and/or  moderate complexity laboratory testing.

## 2024-01-26 NOTE — PROGRESS NOTES
"  Assessment & Plan     (J06.9) Upper respiratory tract infection, unspecified type  (primary encounter diagnosis)  Comment: Patient with URI symptoms.  Discussed with patient possibility of flu and COVID.  She has a history of chronic obstructive pulmonary disease with low oxygen saturations.  She denies any productive cough.  She does not feel short of breath.  Lungs without any crackles on examination.  Influenza testing is negative.  Discussed with patient COVID testing.  She is amenable to this.  Overall she feels improved today she will continue monitor symptoms and return with any worsening or new concerns.  Patient declined chest x-ray or any further evaluation at this time. (Prior wellness visit oxygen saturation 93%)  Plan: Influenza A & B Antigen - Clinic Collect,         Symptomatic COVID-19 Virus (Coronavirus) by PCR        Nose          (J44.1) Chronic obstructive pulmonary disease with acute exacerbation (H)  Comment:   Plan:     Lino Enamorado is a 78 year old, presenting for the following health issues:  Generalized Body Aches (LFT flank pain, GI upset )        1/26/2024     2:05 PM   Additional Questions   Roomed by Gary HARDIN LPN   Accompanied by Self         1/26/2024     2:05 PM   Patient Reported Additional Medications   Patient reports taking the following new medications None     HPI   1/23/2024 patient developed URI symptoms.  Nasal congestion.  Was having a dry cough.  Was having subjective fevers and chills with this.  No urinary symptoms.  No abdominal pain.  No nausea, vomiting, diarrhea.  Patient does smoke 2 to 3 cigarettes/day.  Denies any discomfort at this time.  Overall patient has had improvement of symptoms and feels well today.           Review of Systems  Constitutional, HEENT, cardiovascular, pulmonary, gi and gu systems are negative, except as otherwise noted.      Objective    /64   Pulse 62   Temp 97.6  F (36.4  C) (Tympanic)   Resp 20   Ht 1.581 m (5' 2.25\")  "  Wt 70.3 kg (155 lb)   SpO2 91%   BMI 28.12 kg/m    Body mass index is 28.12 kg/m .  Physical Exam       ENERAL: alert and no distress  EYES: Eyes grossly normal to inspection, PERRL and conjunctivae and sclerae normal  HENT: ear canals and TM's normal, nose and mouth without ulcers or lesions  RESP: lungs clear to auscultation - no rales, rhonchi or wheezes  CV: regular rate and rhythm, normal S1 S2, no S3 or S4, no murmur, click or rub, no peripheral edema  ABDOMEN: soft, nontender, no hepatosplenomegaly, no masses and bowel sounds normal  MS: no gross musculoskeletal defects noted, no edema          Signed Electronically by: John Gutierrez PA-C

## 2024-01-27 LAB — SARS-COV-2 RNA RESP QL NAA+PROBE: NEGATIVE

## 2024-02-01 NOTE — PATIENT INSTRUCTIONS
Take medicines as prescribed    See your family doctor in one to 2 days for further evaluation, workup, and treatment as necessary    Avoid driving or operating machinery while taking medicines as some medicines might cause drowsiness and may cause problems. Also pain medicines have potential of being addictive  so use Pain meds specially Narcotics Sparingly.    The exam and treatment you received in Emergency Room was for an urgent problem and NOT INTENDED AS COMPLETE CARE. It is important that you FOLLOW UP with a doctor for ongoing care. If your symptoms become WORSE or you DO NOT IMPROVE and you are unable to reach your health care provider, you should RETURN to the emergency department. The Emergency Room doctor has provided a PRELIMINARY INTERPRETATION of all your STUDIES. A final interpretation may be done after you are discharged. IF A CHANGE in your diagnosis or treatment is needed WE WILL CONTACT YOU. It is critical that we have a CURRENT PHONE NUMBER FOR YOU.     [facility] for vaccines for 75+ and Healthcare Workers:  (Roughly [phone] spots/day) -            [facility]  .         [place]     Appointments ARE NEEDED, NO WALK-INS!     There is another way to schedule, but MyChart is more efficient.      The direct phone # for vaccine scheduling is: [phone]    Go to [email or web address] to register for the wait list.        [place] [facility] information for Covid vaccine:   Phone:  [phone], or toll free, [phone]  [place] Registration: [email or web address] then clinic on Find My Vaccine.       Continue all medicaitons

## 2024-03-20 DIAGNOSIS — I42.2 HYPERTENSIVE HYPERTROPHIC CARDIOMYOPATHY, WITHOUT HEART FAILURE (H): ICD-10-CM

## 2024-03-20 DIAGNOSIS — I11.9 HYPERTENSIVE HYPERTROPHIC CARDIOMYOPATHY, WITHOUT HEART FAILURE (H): ICD-10-CM

## 2024-03-20 DIAGNOSIS — E87.1 HYPONATREMIA: ICD-10-CM

## 2024-03-20 DIAGNOSIS — I10 HYPERTENSION GOAL BP (BLOOD PRESSURE) < 140/90: ICD-10-CM

## 2024-03-20 NOTE — LETTER
March 22, 2024    Kelsea Valentine  [address] [address]          Jd Enamorado,     I received a refill request for amlodipine and spironolactone.  I have sent a 6 month supply to your pharmacy.  You are due for labs; I have ordered the tests, please call to make a non-fasting lab appointment within 2 weeks.       Maritza Lopez MD             Thank you,   Your [facility] Team/AT  [phone]

## 2024-03-22 RX ORDER — AMLODIPINE BESYLATE 5 MG/1
5 TABLET ORAL DAILY
Qty: 100 TABLET | Refills: 1 | Status: SHIPPED | OUTPATIENT
Start: 2024-03-22 | End: 2024-09-12

## 2024-03-22 RX ORDER — SPIRONOLACTONE 25 MG/1
25 TABLET ORAL DAILY
Qty: 100 TABLET | Refills: 1 | Status: SHIPPED | OUTPATIENT
Start: 2024-03-22 | End: 2024-09-12

## 2024-03-22 NOTE — TELEPHONE ENCOUNTER
Please mail letter    Jd Enamorado,    I received a refill request for amlodipine and spironolactone.  I have sent a 6 month supply to your pharmacy.  You are due for labs; I have ordered the tests, please call to make a non-fasting lab appointment within 2 weeks.      Maritza Lopez MD

## 2024-04-01 DIAGNOSIS — J43.9 PULMONARY EMPHYSEMA, UNSPECIFIED EMPHYSEMA TYPE (H): ICD-10-CM

## 2024-04-02 RX ORDER — UMECLIDINIUM 62.5 UG/1
AEROSOL, POWDER ORAL
Qty: 100 EACH | Refills: 1 | Status: SHIPPED | OUTPATIENT
Start: 2024-04-02 | End: 2024-06-24 | Stop reason: ALTCHOICE

## 2024-04-15 ENCOUNTER — LAB (OUTPATIENT)
Dept: LAB | Facility: CLINIC | Age: 79
End: 2024-04-15
Payer: COMMERCIAL

## 2024-04-15 ENCOUNTER — DOCUMENTATION ONLY (OUTPATIENT)
Dept: FAMILY MEDICINE | Facility: CLINIC | Age: 79
End: 2024-04-15

## 2024-04-15 DIAGNOSIS — N18.31 STAGE 3A CHRONIC KIDNEY DISEASE (H): ICD-10-CM

## 2024-04-15 DIAGNOSIS — E11.22 TYPE 2 DIABETES MELLITUS WITH STAGE 3A CHRONIC KIDNEY DISEASE, WITHOUT LONG-TERM CURRENT USE OF INSULIN (H): Primary | ICD-10-CM

## 2024-04-15 DIAGNOSIS — E11.22 TYPE 2 DIABETES MELLITUS WITH STAGE 3A CHRONIC KIDNEY DISEASE, WITHOUT LONG-TERM CURRENT USE OF INSULIN (H): ICD-10-CM

## 2024-04-15 DIAGNOSIS — N18.31 TYPE 2 DIABETES MELLITUS WITH STAGE 3A CHRONIC KIDNEY DISEASE, WITHOUT LONG-TERM CURRENT USE OF INSULIN (H): ICD-10-CM

## 2024-04-15 DIAGNOSIS — N18.31 TYPE 2 DIABETES MELLITUS WITH STAGE 3A CHRONIC KIDNEY DISEASE, WITHOUT LONG-TERM CURRENT USE OF INSULIN (H): Primary | ICD-10-CM

## 2024-04-15 LAB
ERYTHROCYTE [DISTWIDTH] IN BLOOD BY AUTOMATED COUNT: 13.1 % (ref 10–15)
HBA1C MFR BLD: 6.6 % (ref 0–5.6)
HCT VFR BLD AUTO: 44.7 % (ref 35–47)
HGB BLD-MCNC: 14.5 G/DL (ref 11.7–15.7)
HOLD SPECIMEN: NORMAL
MCH RBC QN AUTO: 31.8 PG (ref 26.5–33)
MCHC RBC AUTO-ENTMCNC: 32.4 G/DL (ref 31.5–36.5)
MCV RBC AUTO: 98 FL (ref 78–100)
PLATELET # BLD AUTO: 291 10E3/UL (ref 150–450)
PTH-INTACT SERPL-MCNC: 26 PG/ML (ref 15–65)
RBC # BLD AUTO: 4.56 10E6/UL (ref 3.8–5.2)
WBC # BLD AUTO: 5.3 10E3/UL (ref 4–11)

## 2024-04-15 PROCEDURE — 83970 ASSAY OF PARATHORMONE: CPT

## 2024-04-15 PROCEDURE — 36415 COLL VENOUS BLD VENIPUNCTURE: CPT

## 2024-04-15 PROCEDURE — 85027 COMPLETE CBC AUTOMATED: CPT

## 2024-04-15 PROCEDURE — 82306 VITAMIN D 25 HYDROXY: CPT

## 2024-04-15 PROCEDURE — 83036 HEMOGLOBIN GLYCOSYLATED A1C: CPT

## 2024-04-15 PROCEDURE — 80069 RENAL FUNCTION PANEL: CPT

## 2024-04-15 NOTE — PROGRESS NOTES
Kelsea was seen in lab this morning for Pre visit labs, she was fasting. Please place orders as needed. Thank you!     Future Appointments   Date Time Provider Department Center   4/22/2024  2:00 PM [facility]         There is no order available. Please review and place either future orders or HMPO (Review of Health Maintenance Protocol Orders), as appropriate.    Health Maintenance Due   Topic    BMP      Madalyn Nova

## 2024-04-16 LAB
ALBUMIN SERPL BCG-MCNC: 4.3 G/DL (ref 3.5–5.2)
ANION GAP SERPL CALCULATED.3IONS-SCNC: 10 MMOL/L (ref 7–15)
ANION GAP SERPL CALCULATED.3IONS-SCNC: 10 MMOL/L (ref 7–15)
BUN SERPL-MCNC: 27.4 MG/DL (ref 8–23)
BUN SERPL-MCNC: 27.4 MG/DL (ref 8–23)
CALCIUM SERPL-MCNC: 9.7 MG/DL (ref 8.8–10.2)
CALCIUM SERPL-MCNC: 9.7 MG/DL (ref 8.8–10.2)
CHLORIDE SERPL-SCNC: 101 MMOL/L (ref 98–107)
CHLORIDE SERPL-SCNC: 101 MMOL/L (ref 98–107)
CREAT SERPL-MCNC: 1.26 MG/DL (ref 0.51–0.95)
CREAT SERPL-MCNC: 1.26 MG/DL (ref 0.51–0.95)
DEPRECATED HCO3 PLAS-SCNC: 23 MMOL/L (ref 22–29)
DEPRECATED HCO3 PLAS-SCNC: 23 MMOL/L (ref 22–29)
EGFRCR SERPLBLD CKD-EPI 2021: 44 ML/MIN/1.73M2
EGFRCR SERPLBLD CKD-EPI 2021: 44 ML/MIN/1.73M2
GLUCOSE SERPL-MCNC: 119 MG/DL (ref 70–99)
GLUCOSE SERPL-MCNC: 119 MG/DL (ref 70–99)
PHOSPHATE SERPL-MCNC: 3.5 MG/DL (ref 2.5–4.5)
POTASSIUM SERPL-SCNC: 4.6 MMOL/L (ref 3.4–5.3)
POTASSIUM SERPL-SCNC: 4.6 MMOL/L (ref 3.4–5.3)
SODIUM SERPL-SCNC: 134 MMOL/L (ref 135–145)
SODIUM SERPL-SCNC: 134 MMOL/L (ref 135–145)
VIT D+METAB SERPL-MCNC: 81 NG/ML (ref 20–50)

## 2024-04-22 ENCOUNTER — OFFICE VISIT (OUTPATIENT)
Dept: FAMILY MEDICINE | Facility: CLINIC | Age: 79
End: 2024-04-22
Payer: COMMERCIAL

## 2024-04-22 VITALS
OXYGEN SATURATION: 90 % | WEIGHT: 153.6 LBS | HEART RATE: 58 BPM | SYSTOLIC BLOOD PRESSURE: 109 MMHG | BODY MASS INDEX: 28.26 KG/M2 | HEIGHT: 62 IN | TEMPERATURE: 97.8 F | DIASTOLIC BLOOD PRESSURE: 68 MMHG | RESPIRATION RATE: 20 BRPM

## 2024-04-22 DIAGNOSIS — N18.31 TYPE 2 DIABETES MELLITUS WITH STAGE 3A CHRONIC KIDNEY DISEASE, WITHOUT LONG-TERM CURRENT USE OF INSULIN (H): ICD-10-CM

## 2024-04-22 DIAGNOSIS — E11.22 TYPE 2 DIABETES MELLITUS WITH STAGE 3A CHRONIC KIDNEY DISEASE, WITHOUT LONG-TERM CURRENT USE OF INSULIN (H): ICD-10-CM

## 2024-04-22 DIAGNOSIS — N18.31 STAGE 3A CHRONIC KIDNEY DISEASE (H): ICD-10-CM

## 2024-04-22 DIAGNOSIS — I11.9 HYPERTENSIVE HYPERTROPHIC CARDIOMYOPATHY, WITHOUT HEART FAILURE (H): ICD-10-CM

## 2024-04-22 DIAGNOSIS — I42.2 HYPERTENSIVE HYPERTROPHIC CARDIOMYOPATHY, WITHOUT HEART FAILURE (H): ICD-10-CM

## 2024-04-22 DIAGNOSIS — R06.09 DOE (DYSPNEA ON EXERTION): ICD-10-CM

## 2024-04-22 DIAGNOSIS — J44.1 CHRONIC OBSTRUCTIVE PULMONARY DISEASE WITH ACUTE EXACERBATION (H): Primary | ICD-10-CM

## 2024-04-22 PROCEDURE — 99214 OFFICE O/P EST MOD 30 MIN: CPT | Performed by: FAMILY MEDICINE

## 2024-04-22 PROCEDURE — G2211 COMPLEX E/M VISIT ADD ON: HCPCS | Performed by: FAMILY MEDICINE

## 2024-04-22 ASSESSMENT — PAIN SCALES - GENERAL: PAINLEVEL: NO PAIN (0)

## 2024-04-22 ASSESSMENT — ENCOUNTER SYMPTOMS: HYPERTENSION: 1

## 2024-04-22 NOTE — PATIENT INSTRUCTIONS
Trial of cutting amlodipine in half and take 2.5 mg daily.  Monitor if your fatigue and shortness of breath improve with your blood pressure slightly higher.    Check your blood pressure 3 times a week and notify Maritza Lopez MD if they readings are above 140.

## 2024-04-22 NOTE — PROGRESS NOTES
"  Assessment & Plan     (J44.1) Chronic obstructive pulmonary disease with acute exacerbation (H)  (primary encounter diagnosis)  Comment: may be source of PEACOCK  Plan: Fluticasone-Umeclidin-Vilant (TRELEGY ELLIPTA)         100-62.5-25 MCG/ACT oral inhaler        Start trelegy if covered, stop Incruse if starting Trelegy  Add mucinex to help mobilize mucus      (R06.09) PEACOCK (dyspnea on exertion)  Comment: likely progression of COPD  Plan: see above plan  Could also be low bp, cut amlodipine in half to trial 2.5 mg daily  Monitor bp and symptoms    (I11.9,  I42.2) Hypertensive hypertrophic cardiomyopathy, without heart failure (H)  Comment: stable  Plan: has follow-up with cardiology in June    (E11.22,  N18.31) Type 2 diabetes mellitus with stage 3a chronic kidney disease, without long-term current use of insulin (H)  Comment: A1c up slightly  Plan: no change at this time, recheck in 6 months    (N18.31) Stage 3a chronic kidney disease (H)  Comment: stable over past 6 months  Plan: unable to afford SGLT2i  Continue good BP control.    The longitudinal plan of care for the diagnosis(es)/condition(s) as documented were addressed during this visit. Due to the added complexity in care, I will continue to support Kelsea in the subsequent management and with ongoing continuity of care.             BMI  Estimated body mass index is 27.87 kg/m  as calculated from the following:    Height as of this encounter: 1.581 m (5' 2.25\").    Weight as of this encounter: 69.7 kg (153 lb 9.6 oz).         See Patient Instructions    Subjective   Kelsea is a 79 year old, presenting for the following health issues:  Hypertension and Lipids      4/22/2024     1:38 PM   Additional Questions   Roomed by Low     Hypertension     History of Present Illness       Hyperlipidemia:  She presents for follow up of hyperlipidemia.   She is taking medication to lower cholesterol. She is not having myalgia or other side effects to statin " medications.    Hypertension: She presents for follow up of hypertension.  She does check blood pressure  regularly outside of the clinic. Outpatient blood pressures have not been over 140/90. She follows a low salt diet.       Pt here for refills and lab review.  Received letter that Incruse inhaler will not be covered going forward though no alternative listed.  Pt reports more shortness of breath with exertion.  No cp, no palpitations or chest tightness.  Feels throat is tight, this resolves with rest.  + productive cough for over 1 month    Overall feeling ok except for breathing.    Had visit with nephrology, recommend SGLT2i, pt not able to afford the medication.            BP Readings from Last 3 Encounters:   04/22/24 109/68   01/26/24 114/64   10/25/23 123/73    Wt Readings from Last 3 Encounters:   04/22/24 69.7 kg (153 lb 9.6 oz)   01/26/24 70.3 kg (155 lb)   10/25/23 73 kg (161 lb)                  Patient Active Problem List   Diagnosis    Hyperlipidemia LDL goal <100    Hypertensive hypertrophic cardiomyopathy (H)    Mitral valve insufficiency    Hypertension goal BP (blood pressure) < 140/90    CKD (chronic kidney disease) stage 3, GFR 30-59 ml/min (H)    GERD (gastroesophageal reflux disease)    COPD (chronic obstructive pulmonary disease) (H)    Abnormal glucose    Advanced directives, counseling/discussion    Pseudophakia, ou; Yag Caps, od    Benign neoplasm of colon, unspecified part of colon    Posterior vitreous detachment of both eyes    Type 2 diabetes mellitus with stage 3a chronic kidney disease, without long-term current use of insulin (H)     Past Surgical History:   Procedure Laterality Date    CATARACT IOL, RT/LT      CHOLECYSTECTOMY, OPEN      COLONOSCOPY N/A 07/27/2015    Procedure: COLONOSCOPY;  Surgeon: Dilip Quintero MD;  Location: [facility] OR    COLONOSCOPY WITH CO2 INSUFFLATION N/A 07/27/2015    Procedure: COLONOSCOPY WITH CO2 INSUFFLATION;  Surgeon: Dilip Quintero MD;   Location: [facility] OR    HC TOOTH EXTRACTION W/FORCEP      false teeth    LASER YAG CAPSULOTOMY Right 01/04/2022    NON-SURGICAL SEPTAL REDUCTION THERAPY W/ CORONARY ARTERIOGRAMS, W/WO TEMPORARY PACEMAKER      PHACOEMULSIFICATION WITH STANDARD INTRAOCULAR LENS IMPLANT  03/2017; 3/2017    left eye; right eye    SURGICAL HISTORY OF -   04/2010    mitral valvuloplasty [place]       Social History     Tobacco Use    Smoking status: Some Days     Current packs/day: 1.00     Average packs/day: 1 pack/day for 56.3 years (56.3 ttl pk-yrs)     Types: Cigarettes     Start date: 1/15/1968    Smokeless tobacco: Never    Tobacco comments:      smokes   Substance Use Topics    Alcohol use: No     Family History   Problem Relation Age of Onset    Cancer Mother         stomach,bone    Alcohol/Drug Mother         smoker    C.A.D. Father     Cancer Father     Alcohol/Drug Father         smoker    Hypertension Father     Heart Disease Sister     Lipids Daughter     Heart Disease Sister     Breast Cancer Sister 64    Other Cancer Sister         bone , kidney    Brain Cancer Sister     Cerebrovascular Disease Brother     Cardiovascular Brother     Diabetes Brother     Hypertension Brother     Hypertension Brother     Cancer - colorectal Daughter 41    Thyroid Disease No family hx of     Glaucoma No family hx of     Macular Degeneration No family hx of          Current Outpatient Medications   Medication Sig Dispense Refill    albuterol (PROAIR HFA/PROVENTIL HFA/VENTOLIN HFA) 108 (90 Base) MCG/ACT inhaler USE 2 INHALATIONS BY MOUTH  INTO THE LUNGS EVERY 6  HOURS 54 g 2    amLODIPine (NORVASC) 5 MG tablet TAKE 1 TABLET BY MOUTH DAILY 100 tablet 1    atorvastatin (LIPITOR) 20 MG tablet Take 1 tablet (20 mg) by mouth daily 100 tablet 2    CALCIUM 600/VITAMIN D OR 1 everyday      Cholecalciferol (VITAMIN D) 1000 UNIT capsule Take 1 capsule by mouth daily.      FISH OIL OR one everyday      Fluticasone-Umeclidin-Vilant (TRELEGY ELLIPTA)  "100-62.5-25 MCG/ACT oral inhaler Inhale 1 puff into the lungs daily 180 each 0    GLUCOSAMINE 1500 COMPLEX OR one everyday      metoprolol succinate ER (TOPROL XL) 100 MG 24 hr tablet TAKE 1 TABLET BY MOUTH DAILY 100 tablet 2    omeprazole 20 MG tablet Take 1 tablet by mouth daily. Take 30-60 minutes before a meal. 30 tablet 1    ONE-A-DAY WEIGHT SMART ADVANCE OR 1 every other day      spironolactone (ALDACTONE) 25 MG tablet TAKE 1 TABLET BY MOUTH DAILY 100 tablet 1    TYLENOL CAPS 500 MG OR 1 CAPSULE EVERY 4 HOURS AS NEEDED      umeclidinium (INCRUSE ELLIPTA) 62.5 MCG/ACT inhaler USE 1 INHALATION BY MOUTH DAILY 100 each 1    VITAMIN C OR 1 everyday       Recent Labs   Lab Test 04/15/24  0800 10/25/23  0816 09/06/23  0712 09/06/22  1553 08/29/22  0815 08/31/21  0822 08/24/21  0853 02/15/21  0826 10/05/20  0858   A1C 6.6*  --  6.5*  --  6.3*   < >  --  6.4*  --    LDL  --   --  86  --  73  --  101* 101*  --    HDL  --   --  44*  --  50  --  49* 47*  --    TRIG  --   --  190*  --  132  --  187* 191*  --    ALT  --   --   --   --   --   --  26  --   --    CR 1.26*  1.26* 1.23* 1.08*   < > 1.02   < > 1.07* 1.11* 0.99   GFRESTIMATED 44*  44* 45* 53*   < > 57*   < > 51* 49* 56*   GFRESTBLACK  --   --   --   --   --   --   --  57* 65   POTASSIUM 4.6  4.6 5.5* 4.2   < > 4.5   < > 4.7 4.7 4.6    < > = values in this interval not displayed.         Review of Systems  Constitutional, neuro, ENT, endocrine, pulmonary, cardiac, gastrointestinal, genitourinary, musculoskeletal, integument and psychiatric systems are negative, except as otherwise noted.      Objective    /68   Pulse 58   Temp 97.8  F (36.6  C) (Oral)   Resp 20   Ht 1.581 m (5' 2.25\")   Wt 69.7 kg (153 lb 9.6 oz)   SpO2 90%   BMI 27.87 kg/m    Body mass index is 27.87 kg/m .  Physical Exam   GENERAL: alert and no distress, tobacco scent  EYES: Eyes grossly normal to inspection, PERRL and conjunctivae and sclerae normal  RESP: lungs clear to " auscultation - no rales, rhonchi or wheezes and prolonged expiratory phase  CV: regular rate and rhythm, normal S1 S2, no S3 or S4, no murmur, click or rub, no peripheral edema   MS: no gross musculoskeletal defects noted, no edema  SKIN: no suspicious lesions or rashes  PSYCH: mentation appears normal, affect normal/bright            Signed Electronically by: Maritza Lopez MD

## 2024-05-29 DIAGNOSIS — I42.2 HYPERTENSIVE HYPERTROPHIC CARDIOMYOPATHY, WITHOUT HEART FAILURE (H): ICD-10-CM

## 2024-05-29 DIAGNOSIS — I11.9 HYPERTENSIVE HYPERTROPHIC CARDIOMYOPATHY, WITHOUT HEART FAILURE (H): ICD-10-CM

## 2024-05-29 DIAGNOSIS — I10 HYPERTENSION GOAL BP (BLOOD PRESSURE) < 140/90: ICD-10-CM

## 2024-05-30 RX ORDER — METOPROLOL SUCCINATE 100 MG/1
TABLET, EXTENDED RELEASE ORAL
Qty: 100 TABLET | Refills: 0 | Status: SHIPPED | OUTPATIENT
Start: 2024-05-30 | End: 2024-09-12

## 2024-05-30 RX ORDER — ATORVASTATIN CALCIUM 20 MG/1
20 TABLET, FILM COATED ORAL DAILY
Qty: 100 TABLET | Refills: 0 | Status: SHIPPED | OUTPATIENT
Start: 2024-05-30 | End: 2024-08-08

## 2024-06-23 DIAGNOSIS — J44.1 CHRONIC OBSTRUCTIVE PULMONARY DISEASE WITH ACUTE EXACERBATION (H): ICD-10-CM

## 2024-06-24 RX ORDER — FLUTICASONE FUROATE, UMECLIDINIUM BROMIDE AND VILANTEROL TRIFENATATE 100; 62.5; 25 UG/1; UG/1; UG/1
POWDER RESPIRATORY (INHALATION)
Qty: 180 EACH | Refills: 1 | Status: SHIPPED | OUTPATIENT
Start: 2024-06-24 | End: 2024-08-12

## 2024-06-24 RX ORDER — IRBESARTAN 300 MG/1
300 TABLET ORAL
COMMUNITY
Start: 2024-06-03

## 2024-08-07 DIAGNOSIS — E11.22 TYPE 2 DIABETES MELLITUS WITH STAGE 3A CHRONIC KIDNEY DISEASE, WITHOUT LONG-TERM CURRENT USE OF INSULIN (H): Primary | ICD-10-CM

## 2024-08-07 DIAGNOSIS — I42.2 HYPERTENSIVE HYPERTROPHIC CARDIOMYOPATHY, WITHOUT HEART FAILURE (H): ICD-10-CM

## 2024-08-07 DIAGNOSIS — E78.5 HYPERLIPIDEMIA LDL GOAL <100: ICD-10-CM

## 2024-08-07 DIAGNOSIS — I11.9 HYPERTENSIVE HYPERTROPHIC CARDIOMYOPATHY, WITHOUT HEART FAILURE (H): ICD-10-CM

## 2024-08-07 DIAGNOSIS — N18.31 TYPE 2 DIABETES MELLITUS WITH STAGE 3A CHRONIC KIDNEY DISEASE, WITHOUT LONG-TERM CURRENT USE OF INSULIN (H): Primary | ICD-10-CM

## 2024-08-07 NOTE — LETTER
August 8, 2024    Kelsea Valentine  [address] [address] [address]    Dear Kelsea,       We recently received a refill request for atorvastatin (LIPITOR) 20 MG tablet.  We have refilled this for a one time supply only because you are due for a:    Wellness exam and pre visit labs in Sept.      Please call at your earliest convenience so that there will not be a delay with your future refills.          Thank you,   Your [facility] Team/AT  [phone]

## 2024-08-08 RX ORDER — ATORVASTATIN CALCIUM 20 MG/1
20 TABLET, FILM COATED ORAL DAILY
Qty: 100 TABLET | Refills: 2 | Status: SHIPPED | OUTPATIENT
Start: 2024-08-08

## 2024-08-12 ENCOUNTER — PATIENT OUTREACH (OUTPATIENT)
Dept: CARE COORDINATION | Facility: CLINIC | Age: 79
End: 2024-08-12
Payer: COMMERCIAL

## 2024-08-12 ENCOUNTER — TELEPHONE (OUTPATIENT)
Dept: FAMILY MEDICINE | Facility: CLINIC | Age: 79
End: 2024-08-12
Payer: COMMERCIAL

## 2024-08-12 NOTE — TELEPHONE ENCOUNTER
"Pt calling in stating she is no longer taking Fluticasone-Umeclidin-Vilant oral inhaler. Pt is requesting provider discontinue so she \"does not get more from [facility]\". RN attempted to ask additional questions, but pt just asked that provider made aware. Pt then stated \"bye bye\" and disconnected call.     Routing to provider - Can you please remove Trelegy Ellipta inhaler? Pt is no longer taking.     Thank you - Dafne Mckinney, JOHNN, RN      "

## 2024-09-05 ENCOUNTER — LAB (OUTPATIENT)
Dept: LAB | Facility: CLINIC | Age: 79
End: 2024-09-05
Payer: COMMERCIAL

## 2024-09-05 DIAGNOSIS — I42.2 HYPERTENSIVE HYPERTROPHIC CARDIOMYOPATHY, WITHOUT HEART FAILURE (H): ICD-10-CM

## 2024-09-05 DIAGNOSIS — E78.5 HYPERLIPIDEMIA LDL GOAL <100: ICD-10-CM

## 2024-09-05 DIAGNOSIS — N18.31 TYPE 2 DIABETES MELLITUS WITH STAGE 3A CHRONIC KIDNEY DISEASE, WITHOUT LONG-TERM CURRENT USE OF INSULIN (H): ICD-10-CM

## 2024-09-05 DIAGNOSIS — E11.22 TYPE 2 DIABETES MELLITUS WITH STAGE 3A CHRONIC KIDNEY DISEASE, WITHOUT LONG-TERM CURRENT USE OF INSULIN (H): ICD-10-CM

## 2024-09-05 DIAGNOSIS — I11.9 HYPERTENSIVE HYPERTROPHIC CARDIOMYOPATHY, WITHOUT HEART FAILURE (H): ICD-10-CM

## 2024-09-05 LAB
ANION GAP SERPL CALCULATED.3IONS-SCNC: 11 MMOL/L (ref 7–15)
BUN SERPL-MCNC: 30.6 MG/DL (ref 8–23)
CALCIUM SERPL-MCNC: 9.8 MG/DL (ref 8.8–10.4)
CHLORIDE SERPL-SCNC: 96 MMOL/L (ref 98–107)
CHOLEST SERPL-MCNC: 151 MG/DL
CREAT SERPL-MCNC: 1.53 MG/DL (ref 0.51–0.95)
EGFRCR SERPLBLD CKD-EPI 2021: 35 ML/MIN/1.73M2
FASTING STATUS PATIENT QL REPORTED: YES
FASTING STATUS PATIENT QL REPORTED: YES
GLUCOSE SERPL-MCNC: 110 MG/DL (ref 70–99)
HBA1C MFR BLD: 6.3 % (ref 0–5.6)
HCO3 SERPL-SCNC: 25 MMOL/L (ref 22–29)
HDLC SERPL-MCNC: 39 MG/DL
LDLC SERPL CALC-MCNC: 77 MG/DL
NONHDLC SERPL-MCNC: 112 MG/DL
POTASSIUM SERPL-SCNC: 4.8 MMOL/L (ref 3.4–5.3)
SODIUM SERPL-SCNC: 132 MMOL/L (ref 135–145)
TRIGL SERPL-MCNC: 176 MG/DL

## 2024-09-05 PROCEDURE — 83036 HEMOGLOBIN GLYCOSYLATED A1C: CPT

## 2024-09-05 PROCEDURE — 80048 BASIC METABOLIC PNL TOTAL CA: CPT

## 2024-09-05 PROCEDURE — 80061 LIPID PANEL: CPT

## 2024-09-05 PROCEDURE — 36415 COLL VENOUS BLD VENIPUNCTURE: CPT

## 2024-09-12 ENCOUNTER — OFFICE VISIT (OUTPATIENT)
Dept: FAMILY MEDICINE | Facility: CLINIC | Age: 79
End: 2024-09-12
Payer: COMMERCIAL

## 2024-09-12 VITALS
HEART RATE: 60 BPM | HEIGHT: 63 IN | BODY MASS INDEX: 26.58 KG/M2 | TEMPERATURE: 97.4 F | SYSTOLIC BLOOD PRESSURE: 122 MMHG | RESPIRATION RATE: 20 BRPM | OXYGEN SATURATION: 91 % | DIASTOLIC BLOOD PRESSURE: 71 MMHG | WEIGHT: 150 LBS

## 2024-09-12 DIAGNOSIS — Z00.00 ENCOUNTER FOR MEDICARE ANNUAL WELLNESS EXAM: Primary | ICD-10-CM

## 2024-09-12 DIAGNOSIS — J43.9 PULMONARY EMPHYSEMA, UNSPECIFIED EMPHYSEMA TYPE (H): ICD-10-CM

## 2024-09-12 DIAGNOSIS — I42.2 HYPERTENSIVE HYPERTROPHIC CARDIOMYOPATHY, WITHOUT HEART FAILURE (H): ICD-10-CM

## 2024-09-12 DIAGNOSIS — E87.1 HYPONATREMIA: ICD-10-CM

## 2024-09-12 DIAGNOSIS — I10 HYPERTENSION GOAL BP (BLOOD PRESSURE) < 140/90: ICD-10-CM

## 2024-09-12 DIAGNOSIS — I11.9 HYPERTENSIVE HYPERTROPHIC CARDIOMYOPATHY, WITHOUT HEART FAILURE (H): ICD-10-CM

## 2024-09-12 PROCEDURE — 99214 OFFICE O/P EST MOD 30 MIN: CPT | Mod: 25 | Performed by: FAMILY MEDICINE

## 2024-09-12 PROCEDURE — G0439 PPPS, SUBSEQ VISIT: HCPCS | Performed by: FAMILY MEDICINE

## 2024-09-12 RX ORDER — AMLODIPINE BESYLATE 5 MG/1
5 TABLET ORAL DAILY
Qty: 100 TABLET | Refills: 1 | Status: SHIPPED | OUTPATIENT
Start: 2024-09-12

## 2024-09-12 RX ORDER — IPRATROPIUM BROMIDE AND ALBUTEROL SULFATE 2.5; .5 MG/3ML; MG/3ML
1 SOLUTION RESPIRATORY (INHALATION) 2 TIMES DAILY
Qty: 540 ML | Refills: 1 | Status: SHIPPED | OUTPATIENT
Start: 2024-09-12

## 2024-09-12 RX ORDER — BUDESONIDE 1 MG/2ML
1 INHALANT ORAL
Qty: 180 ML | Refills: 1 | Status: SHIPPED | OUTPATIENT
Start: 2024-09-12

## 2024-09-12 RX ORDER — METOPROLOL SUCCINATE 100 MG/1
TABLET, EXTENDED RELEASE ORAL
Qty: 100 TABLET | Refills: 1 | Status: SHIPPED | OUTPATIENT
Start: 2024-09-12

## 2024-09-12 RX ORDER — IPRATROPIUM BROMIDE AND ALBUTEROL SULFATE 2.5; .5 MG/3ML; MG/3ML
1 SOLUTION RESPIRATORY (INHALATION) 2 TIMES DAILY
Qty: 180 ML | Refills: 0 | Status: SHIPPED | OUTPATIENT
Start: 2024-09-12 | End: 2024-10-12

## 2024-09-12 RX ORDER — SPIRONOLACTONE 25 MG/1
25 TABLET ORAL DAILY
Qty: 100 TABLET | Refills: 1 | Status: SHIPPED | OUTPATIENT
Start: 2024-09-12

## 2024-09-12 RX ORDER — BUDESONIDE 1 MG/2ML
1 INHALANT ORAL
Qty: 120 ML | Refills: 0 | Status: SHIPPED | OUTPATIENT
Start: 2024-09-12

## 2024-09-12 RX ORDER — ALBUTEROL SULFATE 90 UG/1
AEROSOL, METERED RESPIRATORY (INHALATION)
Qty: 54 G | Refills: 2 | Status: SHIPPED | OUTPATIENT
Start: 2024-09-12

## 2024-09-12 SDOH — HEALTH STABILITY: PHYSICAL HEALTH: ON AVERAGE, HOW MANY MINUTES DO YOU ENGAGE IN EXERCISE AT THIS LEVEL?: 120 MIN

## 2024-09-12 ASSESSMENT — PAIN SCALES - GENERAL: PAINLEVEL: NO PAIN (0)

## 2024-09-12 NOTE — RESULT ENCOUNTER NOTE
Addressed at clinic visit.   PULMONARY PROGRESS NOTES


Vitals





Vital Signs








  Date Time  Temp Pulse Resp B/P (MAP) Pulse Ox O2 Delivery O2 Flow Rate FiO2


 


10/18/19 14:00  93 16 111/61 (78) 100 Nasal Cannula 4.0 


 


10/18/19 13:00 97.6       





 97.6       








Lungs:  Clear, Other


Labs





Laboratory Tests








Test


 10/17/19


15:40 10/17/19


19:05 10/18/19


04:30 10/18/19


07:40


 


White Blood Count


 9.7 x10^3/uL


(4.0-11.0) 


 7.4 x10^3/uL


(4.0-11.0) 





 


Red Blood Count


 3.84 x10^6/uL


(4.30-5.70) 


 3.14 x10^6/uL


(4.30-5.70) 





 


Hemoglobin


 11.9 g/dL


(13.0-17.5) 


 9.8 g/dL


(13.0-17.5) 





 


Hematocrit


 36.0 %


(39.0-53.0) 


 28.7 %


(39.0-53.0) 





 


Mean Corpuscular Volume 94 fL ()   91 fL ()  


 


Mean Corpuscular Hemoglobin 31 pg (25-35)   31 pg (25-35)  


 


Mean Corpuscular Hemoglobin


Concent 33 g/dL


(31-37) 


 34 g/dL


(31-37) 





 


Red Cell Distribution Width


 15.9 %


(11.5-14.5) 


 15.4 %


(11.5-14.5) 





 


Platelet Count


 156 x10^3/uL


(140-400) 


 129 x10^3/uL


(140-400) 





 


Neutrophils (%) (Auto) 80 % (31-73)   84 % (31-73)  


 


Lymphocytes (%) (Auto) 13 % (24-48)   9 % (24-48)  


 


Monocytes (%) (Auto) 7 % (0-9)   7 % (0-9)  


 


Eosinophils (%) (Auto) 0 % (0-3)   0 % (0-3)  


 


Basophils (%) (Auto) 0 % (0-3)   0 % (0-3)  


 


Neutrophils # (Auto)


 7.8 x10^3/uL


(1.8-7.7) 


 6.2 x10^3/uL


(1.8-7.7) 





 


Lymphocytes # (Auto)


 1.2 x10^3/uL


(1.0-4.8) 


 0.7 x10^3/uL


(1.0-4.8) 





 


Monocytes # (Auto)


 0.6 x10^3/uL


(0.0-1.1) 


 0.5 x10^3/uL


(0.0-1.1) 





 


Eosinophils # (Auto)


 0.0 x10^3/uL


(0.0-0.7) 


 0.0 x10^3/uL


(0.0-0.7) 





 


Basophils # (Auto)


 0.0 x10^3/uL


(0.0-0.2) 


 0.0 x10^3/uL


(0.0-0.2) 





 


Prothrombin Time


 17.6 SEC


(11.7-14.0) 


 


 





 


Prothromb Time International


Ratio 1.5 (0.8-1.1) 


 


 


 





 


Sodium Level


 139 mmol/L


(136-145) 


 138 mmol/L


(136-145) 





 


Potassium Level


 3.8 mmol/L


(3.5-5.1) 


 3.4 mmol/L


(3.5-5.1) 





 


Chloride Level


 104 mmol/L


() 


 107 mmol/L


() 





 


Carbon Dioxide Level


 21 mmol/L


(21-32) 


 22 mmol/L


(21-32) 





 


Anion Gap 14 (6-14)   9 (6-14)  


 


Blood Urea Nitrogen


 20 mg/dL


(8-26) 


 20 mg/dL


(8-26) 





 


Creatinine


 2.3 mg/dL


(0.7-1.3) 


 1.8 mg/dL


(0.7-1.3) 





 


Estimated GFR


(Cockcroft-Gault) 27.0 


 


 35.9 


 





 


BUN/Creatinine Ratio 9 (6-20)    


 


Glucose Level


 164 mg/dL


(70-99) 


 157 mg/dL


(70-99) 





 


Lactic Acid Level


 7.6 mmol/L


(0.4-2.0) 4.3 mmol/L


(0.4-2.0) 1.8 mmol/L


(0.4-2.0) 





 


Calcium Level


 7.7 mg/dL


(8.5-10.1) 


 7.0 mg/dL


(8.5-10.1) 





 


Total Bilirubin


 1.2 mg/dL


(0.2-1.0) 


 


 





 


Aspartate Amino Transf


(AST/SGOT) 233 U/L


(15-37) 


 


 





 


Alanine Aminotransferase


(ALT/SGPT) 52 U/L (16-63) 


 


 


 





 


Alkaline Phosphatase


 240 U/L


() 


 


 





 


Troponin I Quantitative


 < 0.017 ng/mL


(0.000-0.055) 


 


 





 


NT-Pro-B-Type Natriuretic


Peptide 2822 pg/mL


(0-449) 


 


 





 


Total Protein


 5.5 g/dL


(6.4-8.2) 


 


 





 


Albumin


 1.4 g/dL


(3.4-5.0) 


 


 





 


Albumin/Globulin Ratio 0.3 (1.0-1.7)    


 


Magnesium Level


 


 


 1.9 mg/dL


(1.8-2.4) 





 


Iron Level


 


 


 48 ug/dL


() 





 


Total Iron Binding Capacity


 


 


 59 ug/dL


(250-450) 





 


Iron Saturation   81 % (15-34)  


 


Urine Collection Type    Unknown 


 


Urine Color    Stefanie 


 


Urine Clarity    Clear 


 


Urine pH    5.0 


 


Urine Specific Gravity    >=1.030 


 


Urine Protein


 


 


 


 Negative mg/dL


(NEG-TRACE)


 


Urine Glucose (UA)


 


 


 


 Negative mg/dL


(NEG)


 


Urine Ketones (Stick)


 


 


 


 Negative mg/dL


(NEG)


 


Urine Blood    Negative (NEG) 


 


Urine Nitrite    Negative (NEG) 


 


Urine Bilirubin    Small (NEG) 


 


Urine Urobilinogen Dipstick


 


 


 


 0.2 mg/dL (0.2


mg/dL)


 


Urine Leukocyte Esterase    Negative (NEG) 


 


Urine RBC    0 /HPF (0-2) 


 


Urine WBC    1-4 /HPF (0-4) 


 


Urine Squamous Epithelial


Cells 


 


 


 Occ /LPF 





 


Urine Bacteria


 


 


 


 Few /HPF


(0-FEW)


 


Urine Hyaline Casts    Moderate /HPF 


 


Urine Mucus    Marked /LPF 








Laboratory Tests








Test


 10/17/19


15:40 10/17/19


19:05 10/18/19


04:30 10/18/19


07:40


 


White Blood Count


 9.7 x10^3/uL


(4.0-11.0) 


 7.4 x10^3/uL


(4.0-11.0) 





 


Red Blood Count


 3.84 x10^6/uL


(4.30-5.70) 


 3.14 x10^6/uL


(4.30-5.70) 





 


Hemoglobin


 11.9 g/dL


(13.0-17.5) 


 9.8 g/dL


(13.0-17.5) 





 


Hematocrit


 36.0 %


(39.0-53.0) 


 28.7 %


(39.0-53.0) 





 


Mean Corpuscular Volume 94 fL ()   91 fL ()  


 


Mean Corpuscular Hemoglobin 31 pg (25-35)   31 pg (25-35)  


 


Mean Corpuscular Hemoglobin


Concent 33 g/dL


(31-37) 


 34 g/dL


(31-37) 





 


Red Cell Distribution Width


 15.9 %


(11.5-14.5) 


 15.4 %


(11.5-14.5) 





 


Platelet Count


 156 x10^3/uL


(140-400) 


 129 x10^3/uL


(140-400) 





 


Neutrophils (%) (Auto) 80 % (31-73)   84 % (31-73)  


 


Lymphocytes (%) (Auto) 13 % (24-48)   9 % (24-48)  


 


Monocytes (%) (Auto) 7 % (0-9)   7 % (0-9)  


 


Eosinophils (%) (Auto) 0 % (0-3)   0 % (0-3)  


 


Basophils (%) (Auto) 0 % (0-3)   0 % (0-3)  


 


Neutrophils # (Auto)


 7.8 x10^3/uL


(1.8-7.7) 


 6.2 x10^3/uL


(1.8-7.7) 





 


Lymphocytes # (Auto)


 1.2 x10^3/uL


(1.0-4.8) 


 0.7 x10^3/uL


(1.0-4.8) 





 


Monocytes # (Auto)


 0.6 x10^3/uL


(0.0-1.1) 


 0.5 x10^3/uL


(0.0-1.1) 





 


Eosinophils # (Auto)


 0.0 x10^3/uL


(0.0-0.7) 


 0.0 x10^3/uL


(0.0-0.7) 





 


Basophils # (Auto)


 0.0 x10^3/uL


(0.0-0.2) 


 0.0 x10^3/uL


(0.0-0.2) 





 


Prothrombin Time


 17.6 SEC


(11.7-14.0) 


 


 





 


Prothromb Time International


Ratio 1.5 (0.8-1.1) 


 


 


 





 


Sodium Level


 139 mmol/L


(136-145) 


 138 mmol/L


(136-145) 





 


Potassium Level


 3.8 mmol/L


(3.5-5.1) 


 3.4 mmol/L


(3.5-5.1) 





 


Chloride Level


 104 mmol/L


() 


 107 mmol/L


() 





 


Carbon Dioxide Level


 21 mmol/L


(21-32) 


 22 mmol/L


(21-32) 





 


Anion Gap 14 (6-14)   9 (6-14)  


 


Blood Urea Nitrogen


 20 mg/dL


(8-26) 


 20 mg/dL


(8-26) 





 


Creatinine


 2.3 mg/dL


(0.7-1.3) 


 1.8 mg/dL


(0.7-1.3) 





 


Estimated GFR


(Cockcroft-Gault) 27.0 


 


 35.9 


 





 


BUN/Creatinine Ratio 9 (6-20)    


 


Glucose Level


 164 mg/dL


(70-99) 


 157 mg/dL


(70-99) 





 


Lactic Acid Level


 7.6 mmol/L


(0.4-2.0) 4.3 mmol/L


(0.4-2.0) 1.8 mmol/L


(0.4-2.0) 





 


Calcium Level


 7.7 mg/dL


(8.5-10.1) 


 7.0 mg/dL


(8.5-10.1) 





 


Total Bilirubin


 1.2 mg/dL


(0.2-1.0) 


 


 





 


Aspartate Amino Transf


(AST/SGOT) 233 U/L


(15-37) 


 


 





 


Alanine Aminotransferase


(ALT/SGPT) 52 U/L (16-63) 


 


 


 





 


Alkaline Phosphatase


 240 U/L


() 


 


 





 


Troponin I Quantitative


 < 0.017 ng/mL


(0.000-0.055) 


 


 





 


NT-Pro-B-Type Natriuretic


Peptide 2822 pg/mL


(0-449) 


 


 





 


Total Protein


 5.5 g/dL


(6.4-8.2) 


 


 





 


Albumin


 1.4 g/dL


(3.4-5.0) 


 


 





 


Albumin/Globulin Ratio 0.3 (1.0-1.7)    


 


Magnesium Level


 


 


 1.9 mg/dL


(1.8-2.4) 





 


Iron Level


 


 


 48 ug/dL


() 





 


Total Iron Binding Capacity


 


 


 59 ug/dL


(250-450) 





 


Iron Saturation   81 % (15-34)  


 


Urine Collection Type    Unknown 


 


Urine Color    Stefanie 


 


Urine Clarity    Clear 


 


Urine pH    5.0 


 


Urine Specific Gravity    >=1.030 


 


Urine Protein


 


 


 


 Negative mg/dL


(NEG-TRACE)


 


Urine Glucose (UA)


 


 


 


 Negative mg/dL


(NEG)


 


Urine Ketones (Stick)


 


 


 


 Negative mg/dL


(NEG)


 


Urine Blood    Negative (NEG) 


 


Urine Nitrite    Negative (NEG) 


 


Urine Bilirubin    Small (NEG) 


 


Urine Urobilinogen Dipstick


 


 


 


 0.2 mg/dL (0.2


mg/dL)


 


Urine Leukocyte Esterase    Negative (NEG) 


 


Urine RBC    0 /HPF (0-2) 


 


Urine WBC    1-4 /HPF (0-4) 


 


Urine Squamous Epithelial


Cells 


 


 


 Occ /LPF 





 


Urine Bacteria


 


 


 


 Few /HPF


(0-FEW)


 


Urine Hyaline Casts    Moderate /HPF 


 


Urine Mucus    Marked /LPF 








Medications





Active Scripts








 Medications  Dose


 Route/Sig


 Max Daily Dose Days Date Category


 


 Aspirin Ec


  (Aspirin) 81 Mg


 Tablet.dr  81 Mg


 PO DAILYWBKFT


  30 6/8/18 Rx


 


 Protonix


  (Pantoprazole


 Sodium) 20 Mg


 Tablet.dr  40 Mg


 PO BID


   3/1/18 Reported


 


 Escitalopram


 Oxalate 10 Mg


 Tablet  10 Mg


 PO DAILY


   11/30/17 Reported











Impression


.


full note dictated


PLEURAL EFFUSION WILL ASK IR FOR THORACENTESIS


THANKS











ALICE TILLMAN MD              Oct 18, 2019 14:46

## 2024-09-12 NOTE — PROGRESS NOTES
Preventive Care Visit  [facility]  Maritza Lopez MD, Family Medicine  Sep 12, 2024      Assessment & Plan     (Z00.00) Encounter for Medicare annual wellness exam  (primary encounter diagnosis)  Comment: preventive needs reviewed   Plan: see orders in Epic.     (J43.9) Pulmonary emphysema, unspecified emphysema type (H)  Comment:  not controlled  Plan: albuterol (PROAIR HFA/PROVENTIL HFA/VENTOLIN         HFA) 108 (90 Base) MCG/ACT inhaler, Nebulizer         and Supplies Order for DME - ONLY FOR DME,         budesonide (PULMICORT) 1 MG/2ML neb solution,         ipratropium - albuterol 0.5 mg/2.5 mg/3 mL         (DUONEB) 0.5-2.5 (3) MG/3ML neb solution,         budesonide (PULMICORT) 1 MG/2ML neb solution,         ipratropium - albuterol 0.5 mg/2.5 mg/3 mL         (DUONEB) 0.5-2.5 (3) MG/3ML neb solution        Start budesonide and duoneb nebs bid with albuterol prn.      (I11.9,  I42.2) Hypertensive hypertrophic cardiomyopathy, without heart failure (H)  Comment: stable, no symptoms  Plan: amLODIPine (NORVASC) 5 MG tablet, metoprolol         succinate ER (TOPROL XL) 100 MG 24 hr tablet     Refill x 6 months     (I10) Hypertension goal BP (blood pressure) < 140/90  Comment: controlled  Plan: amLODIPine (NORVASC) 5 MG tablet, metoprolol         succinate ER (TOPROL XL) 100 MG 24 hr tablet,         spironolactone (ALDACTONE) 25 MG tablet         Refill x 6 months     (E87.1) Hyponatremia  Comment: persistent, may be related to increased albuterol use  Plan: spironolactone (ALDACTONE) 25 MG tablet        Recheck in 3 mnths            Counseling  Appropriate preventive services were addressed with this patient via screening, questionnaire, or discussion as appropriate for fall prevention, nutrition, physical activity, Tobacco-use cessation, social engagement, weight loss and cognition.  Checklist reviewing preventive services available has been given to the patient.  Reviewed patient's diet,  addressing concerns and/or questions.   Patient is at risk for social isolation and has been provided with information about the benefit of social connection.   The patient was instructed to see the dentist every 6 months.   Discussed possible causes of fatigue.     See Patient Instructions    Lino Enamorado is a 79 year old, presenting for the following:  Wellness Visit        9/12/2024     1:42 PM   Additional Questions   Roomed by Julia   Accompanied by self         Health Care Directive  Patient does not have a Health Care Directive or Living Will: Discussed advance care planning with patient; information given to patient to review.    HPI  Had to stop Trelegy due to cost.  Breathing is worse. Wanting something more affordable  O/w feeling well              9/12/2024   General Health   How would you rate your overall physical health? (!) POOR   Feel stress (tense, anxious, or unable to sleep) Not at all            9/12/2024   Nutrition   Diet: Regular (no restrictions)            9/12/2024   Exercise   Days per week of moderate/strenous exercise 4 days   Average minutes spent exercising at this level 120 min            9/12/2024   Social Factors   Frequency of gathering with friends or relatives Never   Worry food won't last until get money to buy more No   Food not last or not have enough money for food? No   Do you have housing? (Housing is defined as stable permanent housing and does not include staying ouside in a car, in a tent, in an abandoned building, in an overnight shelter, or couch-surfing.) Yes   Are you worried about losing your housing? No   Lack of transportation? No   Unable to get utilities (heat,electricity)? No      (!) SOCIAL CONNECTIONS CONCERN      9/12/2024   Fall Risk   Fallen 2 or more times in the past year? No    No   Trouble with walking or balance? No    No       Multiple values from one day are sorted in reverse-chronological order          9/12/2024   Activities of Daily  Living- Home Safety   Needs help with the following daily activites None of the above   Safety concerns in the home None of the above            9/12/2024   Dental   Dentist two times every year? (!) NO            9/12/2024   Hearing Screening   Hearing concerns? None of the above            9/12/2024   Driving Risk Screening   Patient/family members have concerns about driving No            9/12/2024   General Alertness/Fatigue Screening   Have you been more tired than usual lately? (!) YES            9/12/2024   Urinary Incontinence Screening   Bothered by leaking urine in past 6 months No            9/12/2024   TB Screening   Were you born outside of the [place]? No            Today's PHQ-2 Score:       9/12/2024     1:42 PM   PHQ-2 ( 1999 [employer])   Q1: Little interest or pleasure in doing things 0   Q2: Feeling down, depressed or hopeless 0   PHQ-2 Score 0   Q1: Little interest or pleasure in doing things Not at all   Q2: Feeling down, depressed or hopeless Not at all   PHQ-2 Score 0           9/12/2024   Substance Use   Alcohol more than 3/day or more than 7/wk No   Do you have a current opioid prescription? No   How severe/bad is pain from 1 to 10? 0/10 (No Pain)   Do you use any other substances recreationally? No        Social History     Tobacco Use    Smoking status: Former     Current packs/day: 1.00     Average packs/day: 1 pack/day for 56.7 years (56.7 ttl pk-yrs)     Types: Cigarettes     Start date: 1/15/1968    Smokeless tobacco: Never    Tobacco comments:      smokes   Vaping Use    Vaping status: Never Used   Substance Use Topics    Alcohol use: No    Drug use: No          Mammogram Screening - After age 74- determine frequency with patient based on health status, life expectancy and patient goals    G 1 P 1   No LMP recorded. Patient is postmenopausal.     Fasting: No   Td: td 8/2021       Flu: 12/2023      Covid: 12/2023      Shingrix: done      PPV: done       RSV:  12/2023              Cholesterol:   Lab Results   Component Value Date    CHOL 151 09/05/2024    CHOL 186 02/15/2021     Lab Results   Component Value Date    HDL 39 09/05/2024    HDL 47 02/15/2021     Lab Results   Component Value Date    LDL 77 09/05/2024     02/15/2021     Lab Results   Component Value Date    TRIG 176 09/05/2024    TRIG 191 02/15/2021     Lab Results   Component Value Date    CHOLHDLRATIO 4.6 03/18/2015         MMG: no further screening  Dexa: declines      Flex/colo: no further screening      Seat Belt: Yes    Sunscreen use: Yes   Calcium Intake: adeq  Health Care Directive: No  Sexually Active: No    Current contraception: none  History of abnormal Pap smear: Yes: see [facility]  Family history of colon/breast/ovarian cancer: Yes: see [facility]  Regular self breast exam: Yes  History of abnormal mammogram: No      ASCVD Risk   The 10-year ASCVD risk score (Sola HERNÁNDEZ, et al., 2019) is: 39.5%    Values used to calculate the score:      Age: 79 years      Sex: Female      Is Non- : No      Diabetic: Yes      Tobacco smoker: No      Systolic Blood Pressure: 122 mmHg      Is BP treated: Yes      HDL Cholesterol: 39 mg/dL      Total Cholesterol: 151 mg/dL            Reviewed and updated as needed this visit by Provider   Tobacco  Allergies  Meds  Problems  Med Hx  Surg Hx  Fam Hx            Labs reviewed in EPIC  BP Readings from Last 3 Encounters:   09/12/24 122/71   04/22/24 109/68   01/26/24 114/64    Wt Readings from Last 3 Encounters:   09/12/24 68 kg (150 lb)   04/22/24 69.7 kg (153 lb 9.6 oz)   01/26/24 70.3 kg (155 lb)                  Patient Active Problem List   Diagnosis    Hyperlipidemia LDL goal <100    Hypertensive hypertrophic cardiomyopathy (H)    Mitral valve insufficiency    Hypertension goal BP (blood pressure) < 140/90    CKD (chronic kidney disease) stage 3, GFR 30-59 ml/min (H)    GERD (gastroesophageal reflux disease)    COPD (chronic  obstructive pulmonary disease) (H)    Abnormal glucose    Pseudophakia, ou; Yag Caps, od    Benign neoplasm of colon, unspecified part of colon    Posterior vitreous detachment of both eyes    Type 2 diabetes mellitus with stage 3a chronic kidney disease, without long-term current use of insulin (H)     Past Surgical History:   Procedure Laterality Date    CATARACT IOL, RT/LT      CHOLECYSTECTOMY, OPEN      COLONOSCOPY N/A 07/27/2015    Procedure: COLONOSCOPY;  Surgeon: Dilip Quintero MD;  Location: [facility] OR    COLONOSCOPY WITH CO2 INSUFFLATION N/A 07/27/2015    Procedure: COLONOSCOPY WITH CO2 INSUFFLATION;  Surgeon: Dilip Quintero MD;  Location: [facility] OR    HC TOOTH EXTRACTION W/FORCEP      false teeth    LASER YAG CAPSULOTOMY Right 01/04/2022    NON-SURGICAL SEPTAL REDUCTION THERAPY W/ CORONARY ARTERIOGRAMS, W/WO TEMPORARY PACEMAKER      PHACOEMULSIFICATION WITH STANDARD INTRAOCULAR LENS IMPLANT  03/2017; 3/2017    left eye; right eye    SURGICAL HISTORY OF -   04/2010    mitral valvuloplasty [place]       Social History     Tobacco Use    Smoking status: Former     Current packs/day: 1.00     Average packs/day: 1 pack/day for 56.7 years (56.7 ttl pk-yrs)     Types: Cigarettes     Start date: 1/15/1968    Smokeless tobacco: Never    Tobacco comments:      smokes   Substance Use Topics    Alcohol use: No     Family History   Problem Relation Age of Onset    Cancer Mother         stomach,bone    Alcohol/Drug Mother         smoker    C.A.D. Father     Cancer Father     Alcohol/Drug Father         smoker    Hypertension Father     Heart Disease Sister     Lipids Daughter     Heart Disease Sister     Breast Cancer Sister 64    Other Cancer Sister         bone , kidney    Brain Cancer Sister     Cerebrovascular Disease Brother     Cardiovascular Brother     Diabetes Brother     Hypertension Brother     Hypertension Brother     Cancer - colorectal Daughter 41    Thyroid Disease No family hx of      Glaucoma No family hx of     Macular Degeneration No family hx of          Current Outpatient Medications   Medication Sig Dispense Refill    albuterol (PROAIR HFA/PROVENTIL HFA/VENTOLIN HFA) 108 (90 Base) MCG/ACT inhaler USE 2 INHALATIONS BY MOUTH  INTO THE LUNGS EVERY 6  HOURS 54 g 2    amLODIPine (NORVASC) 5 MG tablet Take 1 tablet (5 mg) by mouth daily. 100 tablet 1    atorvastatin (LIPITOR) 20 MG tablet TAKE 1 TABLET BY MOUTH DAILY 100 tablet 2    budesonide (PULMICORT) 1 MG/2ML neb solution Take 2 mLs (1 mg) by nebulization two times daily. 180 mL 1    budesonide (PULMICORT) 1 MG/2ML neb solution Take 2 mLs (1 mg) by nebulization two times daily. 120 mL 0    CALCIUM 600/VITAMIN D OR 1 everyday      Cholecalciferol (VITAMIN D) 1000 UNIT capsule Take 1 capsule by mouth daily.      FISH OIL OR one everyday      GLUCOSAMINE 1500 COMPLEX OR one everyday      ipratropium - albuterol 0.5 mg/2.5 mg/3 mL (DUONEB) 0.5-2.5 (3) MG/3ML neb solution Take 1 vial (3 mLs) by nebulization 2 times daily. 540 mL 1    ipratropium - albuterol 0.5 mg/2.5 mg/3 mL (DUONEB) 0.5-2.5 (3) MG/3ML neb solution Take 1 vial (3 mLs) by nebulization 2 times daily. 180 mL 0    irbesartan (AVAPRO) 300 MG tablet Take 300 mg by mouth      metoprolol succinate ER (TOPROL XL) 100 MG 24 hr tablet TAKE 1 TABLET BY MOUTH  DAILY 100 tablet 1    omeprazole 20 MG tablet Take 1 tablet by mouth daily. Take 30-60 minutes before a meal. 30 tablet 1    ONE-A-DAY WEIGHT SMART ADVANCE OR 1 every other day      spironolactone (ALDACTONE) 25 MG tablet Take 1 tablet (25 mg) by mouth daily. 100 tablet 1    TYLENOL CAPS 500 MG OR 1 CAPSULE EVERY 4 HOURS AS NEEDED      VITAMIN C OR 1 everyday       Current providers sharing in care for this patient include:  Patient Care Team:  Maritza Lopez MD as PCP - General (Family Practice)  Maritza Lopez MD as Assigned PCP    The following health maintenance items are reviewed in Epic and correct as of  "today:  Health Maintenance   Topic Date Due    EYE EXAM  01/16/2024    INFLUENZA VACCINE (1) 09/01/2024    COVID-19 Vaccine (5 - 2023-24 season) 09/01/2024    MICROALBUMIN  09/11/2024    DIABETIC FOOT EXAM  09/11/2024    ANNUAL REVIEW OF HM ORDERS  10/05/2024    A1C  03/05/2025    BMP  03/05/2025    HEMOGLOBIN  04/15/2025    LIPID  09/05/2025    MEDICARE ANNUAL WELLNESS VISIT  09/12/2025    FALL RISK ASSESSMENT  09/12/2025    ADVANCE CARE PLANNING  09/12/2029    DEXA  09/30/2030    DTAP/TDAP/TD IMMUNIZATION (3 - Td or Tdap) 08/31/2031    SPIROMETRY  Completed    HEPATITIS C SCREENING  Completed    COPD ACTION PLAN  Completed    PHQ-2 (once per calendar year)  Completed    Pneumococcal Vaccine: 65+ Years  Completed    URINALYSIS  Completed    ZOSTER IMMUNIZATION  Completed    HPV IMMUNIZATION  Aged Out    MENINGITIS IMMUNIZATION  Aged Out    RSV MONOCLONAL ANTIBODY  Aged Out    MAMMO SCREENING  Discontinued    COLORECTAL CANCER SCREENING  Discontinued    LUNG CANCER SCREENING  Discontinued         Review of Systems  Constitutional, neuro, ENT, endocrine, pulmonary, cardiac, gastrointestinal, genitourinary, musculoskeletal, integument and psychiatric systems are negative, except as otherwise noted.     Objective    Exam  /71   Pulse 60   Temp 97.4  F (36.3  C) (Oral)   Resp 20   Ht 1.588 m (5' 2.5\")   Wt 68 kg (150 lb)   SpO2 91%   BMI 27.00 kg/m     Estimated body mass index is 27 kg/m  as calculated from the following:    Height as of this encounter: 1.588 m (5' 2.5\").    Weight as of this encounter: 68 kg (150 lb).    Physical Exam  GENERAL: alert and no distress  EYES: Eyes grossly normal to inspection, PERRL and conjunctivae and sclerae normal  HENT: ear canals and TM's normal, nose and mouth without ulcers or lesions  NECK: no adenopathy, no asymmetry, masses, or scars  RESP: lungs clear to auscultation - no rales, rhonchi or wheezes  CV: regular rate and rhythm, normal S1 S2, no S3 or S4, no " murmur, click or rub, no peripheral edema  ABDOMEN: soft, nontender, no hepatosplenomegaly, no masses and bowel sounds normal  MS: no gross musculoskeletal defects noted, no edema  SKIN: no suspicious lesions or rashes  NEURO: Normal strength and tone, mentation intact and speech normal  PSYCH: mentation appears normal, affect normal/bright        9/12/2024   Mini Cog   Mini-Cog Not Completed (choose reason) Patient declines      Normal cognition based on my direct observation during interview and exam.     Patient declines, there are NO concerns for cognitive deficits.           Signed Electronically by: Maritza Lopez MD

## 2024-09-12 NOTE — PATIENT INSTRUCTIONS
Patient Education   Preventive Care Advice   This is general advice given by our system to help you stay healthy. However, your care team may have specific advice just for you. Please talk to your care team about your preventive care needs.  Nutrition  Eat 5 or more servings of fruits and vegetables each day.  Try wheat bread, brown rice and whole grain pasta (instead of white bread, rice, and pasta).  Get enough calcium and vitamin D. Check the label on foods and aim for 100% of the RDA (recommended daily allowance).  Lifestyle  Exercise at least 150 minutes each week  (30 minutes a day, 5 days a week).  Do muscle strengthening activities 2 days a week. These help control your weight and prevent disease.  No smoking.  Wear sunscreen to prevent skin cancer.  Have a dental exam and cleaning every 6 months.  Yearly exams  See your health care team every year to talk about:  Any changes in your health.  Any medicines your care team has prescribed.  Preventive care, family planning, and ways to prevent chronic diseases.  Shots (vaccines)   HPV shots (up to age 26), if you've never had them before.  Hepatitis B shots (up to age 59), if you've never had them before.  COVID-19 shot: Get this shot when it's due.  Flu shot: Get a flu shot every year.  Tetanus shot: Get a tetanus shot every 10 years.  Pneumococcal, hepatitis A, and RSV shots: Ask your care team if you need these based on your risk.  Shingles shot (for age 50 and up)  General health tests  Diabetes screening:  Starting at age 35, Get screened for diabetes at least every 3 years.  If you are younger than age 35, ask your care team if you should be screened for diabetes.  Cholesterol test: At age 39, start having a cholesterol test every 5 years, or more often if advised.  Bone density scan (DEXA): At age 50, ask your care team if you should have this scan for osteoporosis (brittle bones).  Hepatitis C: Get tested at least once in your life.  STIs (sexually  transmitted infections)  Before age 24: Ask your care team if you should be screened for STIs.  After age 24: Get screened for STIs if you're at risk. You are at risk for STIs (including HIV) if:  You are sexually active with more than one person.  You don't use condoms every time.  You or a partner was diagnosed with a sexually transmitted infection.  If you are at risk for HIV, ask about PrEP medicine to prevent HIV.  Get tested for HIV at least once in your life, whether you are at risk for HIV or not.  Cancer screening tests  Cervical cancer screening: If you have a cervix, begin getting regular cervical cancer screening tests starting at age 21.  Breast cancer scan (mammogram): If you've ever had breasts, begin having regular mammograms starting at age 40. This is a scan to check for breast cancer.  Colon cancer screening: It is important to start screening for colon cancer at age 45.  Have a colonoscopy test every 10 years (or more often if you're at risk) Or, ask your provider about stool tests like a FIT test every year or Cologuard test every 3 years.  To learn more about your testing options, visit:   .  For help making a decision, visit:   https://bit.ly/pb73648.  Prostate cancer screening test: If you have a prostate, ask your care team if a prostate cancer screening test (PSA) at age 55 is right for you.  Lung cancer screening: If you are a current or former smoker ages 50 to 80, ask your care team if ongoing lung cancer screenings are right for you.  For informational purposes only. Not to replace the advice of your health care provider. Copyright   2023 [employer]. All rights reserved. Clinically reviewed by the [facility] Transitions Program. [employer] 027796 - REV 01/24.

## 2024-09-26 NOTE — MR AVS SNAPSHOT
After Visit Summary   7/13/2017    Kelsea Valentine    MRN: [id number]           Patient Information     Date Of Birth          1945        Visit Information        Provider Department      7/13/2017 5:05 PM Sailaja Lopez NP [facility]        Today's Diagnoses     Dysuria    -  1    Nonspecific finding on examination of urine        Acute cystitis without hematuria          Care Instructions      Understanding Urinary Tract Infections (UTIs)  Most UTIs are caused by bacteria, although they may also be caused by viruses or fungi. Bacteria from the bowel are the most common source of infection. The infection may start because of any of the following:    Sexual activity. During sex, bacteria can travel from the penis, vagina, or rectum into the urethra.     Bacteria on the skin outside the rectum may travel into the urethra. This is more common in women since the rectum and urethra are closer to each other than in men. Wiping from front to back after using the toilet and keeping the area clean can help prevent germs from getting to the urethra.    Blockage of urine flow through the urinary tract. If urine sits too long, germs may start to grow out of control.      Parts of the urinary tract  The infection can occur in any part of the urinary tract.    The kidneys collect and store urine.    The ureters carry urine from the kidneys to the bladder.    The bladder holds urine until you are ready to let it out.    The urethra carries urine from the bladder out of the body. It is shorter in women, so bacteria can move through it more easily. The urethra is longer in men, so a UTI is less likely to reach the bladder or kidneys in men.  Date Last Reviewed: 1/1/2017 2000-2017 [employer]. [address] [place] [address], [place] [address]. All rights reserved. This information is not intended as a substitute for professional medical care. Always follow your healthcare professional's  temperature of 100.5F instructions.                Follow-ups after your visit        Your next 10 appointments already scheduled     Jul 24, 2017 10:45 AM CDT   [facility]    [address]  [place] [address]   [phone]            Sep 19, 2017  8:00 AM CDT   [facility]    [address] [address] [phone]           Patient must bring picture ID.  Patient should be prepared to give a urine specimen  Please do not eat 10-12 hours before your appointment if you are coming in fasting for labs on lipids, cholesterol, or glucose (sugar).  Pregnant women should follow their Care Team instructions. Water with medications is okay. Do not drink coffee or other fluids.   If you have concerns about taking  your medications, please ask at office or if scheduling via [employer], send a message by clicking on Secure Messaging, Message Your Care Team.            Sep 26, 2017 10:15 AM CDT   [facility]   [facility]    [address] [facility] [address] [phone]           Bring a current list of meds and any records pertaining to this visit.  For Physicals, please bring immunization records and any forms needing to be filled out.  Please arrive 10 minutes early to complete paperwork.              Who to contact     If you have questions or need follow up information about today's clinic visit or your schedule please contact [facility] directly at [phone].  Normal or non-critical lab and imaging results will be communicated to you by MyChart, letter or phone within 4 business days after the clinic has received the results. If you do not hear from us within 7 days, please contact the clinic through MyChart or phone. If you have a critical or abnormal lab result, we will notify  "you by phone as soon as possible.  Submit refill requests through [employer] or call your pharmacy and they will forward the refill request to us. Please allow 3 business days for your refill to be completed.          Additional Information About Your Visit        "[employer] Information     [employer] lets you send messages to your doctor, view your test results, renew your prescriptions, schedule appointments and more. To sign up, go to [email or web address] . Click on \"Log in\" on the left side of the screen, which will take you to the Welcome page. Then click on \"Sign up Now\" on the right side of the page.     You will be asked to enter the access code listed below, as well as some personal information. Please follow the directions to create your username and password.     Your access code is: [email or web address]  Expires: 10/11/2017  5:31 PM     Your access code will  in 90 days. If you need help or a new code, please call your [place] clinic or [phone].        Care EveryWhere ID     This is your Care EveryWhere ID. This could be used by other organizations to access your [place] medical records  [id number]        Your Vitals Were     Pulse Temperature Pulse Oximetry BMI (Body Mass Index)          65 97.1  F (36.2  C) (Oral) 90% 29.18 kg/m2         Blood Pressure from Last 3 Encounters:   17 120/70   17 125/73   17 136/68    Weight from Last 3 Encounters:   17 170 lb (77.1 kg)   17 161 lb (73 kg)   17 164 lb (74.4 kg)              We Performed the Following     *UA reflex to Microscopic and Culture ([place] and [facility] (except [place] and [place])     Urine Culture Aerobic Bacterial     Urine Microscopic          Today's Medication Changes          These changes are accurate as of: 17  5:31 PM.  If you have any questions, ask your nurse or doctor.               Start taking these medicines.        Dose/Directions    sulfamethoxazole-trimethoprim 800-160 MG per " tablet   Commonly known as:  BACTRIM DS/SEPTRA DS   Used for:  Acute cystitis without hematuria   Started by:  Sailaja Lopez NP        Dose:  1 tablet   Take 1 tablet by mouth 2 times daily for 3 days   Quantity:  6 tablet   Refills:  0            Where to get your medicines      These medications were sent to [facility] - [place], [place] - [address]  [address], [facility] [address]     Phone:  [phone]     sulfamethoxazole-trimethoprim 800-160 MG per tablet                Primary Care Provider Office Phone # Fax #    Maritza Lopez [phone]       [facility] [address] [address]        Equal Access to Services     JIGNA MAGALLON : Hadii latrice burrell hadasho Soomaali, waaxda luqadaha, qaybta kaalmada adeegyada, gena melissa . So [facility] [phone].    ATENCIÓN: Si habla español, tiene a copeland disposición servicios gratuitos de asistencia lingüística. [facility] [phone].    We comply with applicable federal civil rights laws and [place] laws. We do not discriminate on the basis of race, color, national origin, age, disability sex, sexual orientation or gender identity.            Thank you!     Thank you for choosing [facility]  for your care. Our goal is always to provide you with excellent care. Hearing back from our patients is one way we can continue to improve our services. Please take a few minutes to complete the written survey that you may receive in the mail after your visit with us. Thank you!             Your Updated Medication List - Protect others around you: Learn how to safely use, store and throw away your medicines at www.disposemymeds.org.          This list is accurate as of: 7/13/17  5:31 PM.  Always use your most recent med list.                   Brand Name Dispense Instructions for use Diagnosis    albuterol 108 (90 BASE) MCG/ACT Inhaler    PROAIR HFA/PROVENTIL HFA/VENTOLIN  HFA    1 Inhaler    Inhale 2 puffs into the lungs every 6 hours    Pulmonary emphysema, unspecified emphysema type (H)       amLODIPine 5 MG tablet    NORVASC    90 tablet    Take 1 tablet (5 mg) by mouth daily    Essential hypertension with goal blood pressure less than 140/90, Hypertensive hypertrophic cardiomyopathy, without heart failure (H)       atorvastatin 20 MG tablet    LIPITOR    90 tablet    Take 1 tablet (20 mg) by mouth daily    Hypertensive hypertrophic cardiomyopathy, without heart failure (H)       benzonatate 200 MG capsule    TESSALON    90 capsule    Take 1 capsule (200 mg) by mouth 3 times daily as needed for cough    Pulmonary emphysema, unspecified emphysema type (H)       CALCIUM 600/VITAMIN D PO      1 everyday        CLARITIN 10 MG tablet   Generic drug:  loratadine      1 TABLET DAILY        FISH OIL PO      one everyday        GLUCOSAMINE 1500 COMPLEX PO      one everyday        irbesartan 300 MG tablet    AVAPRO    90 tablet    Take 1 tablet (300 mg) by mouth daily    Hypertensive hypertrophic cardiomyopathy, without heart failure (H)       metoprolol 100 MG 24 hr tablet    TOPROL-XL    90 tablet    Take 1 tablet (100 mg) by mouth daily    Essential hypertension with goal blood pressure less than 140/90, Hypertensive hypertrophic cardiomyopathy, without heart failure (H)       moxifloxacin 0.5 % ophthalmic solution    VIGAMOX    1 Bottle    Place 1 drop Into the left eye 3 times daily    Cataract       omeprazole 20 MG tablet     30 tablet    Take 1 tablet by mouth daily. Take 30-60 minutes before a meal.    Cough, GERD (gastroesophageal reflux disease)       ONE-A-DAY WEIGHT SMART ADVANCE PO      1 every other day        prednisoLONE acetate 1 % ophthalmic susp    PRED FORTE    5 mL    Place 1 drop into the right eye 3 times daily    Cataract       sulfamethoxazole-trimethoprim 800-160 MG per tablet    BACTRIM DS/SEPTRA DS    6 tablet    Take 1 tablet by mouth 2 times daily for 3 days     Acute cystitis without hematuria       TYLENOL CAPS 500 MG OR      1 CAPSULE EVERY 4 HOURS AS NEEDED        umeclidinium 62.5 MCG/INH oral inhaler    INCRUSE ELLIPTA    1 Inhaler    Inhale 1 puff into the lungs daily    Pulmonary emphysema, unspecified emphysema type (H)       VITAMIN C PO      1 everyday        vitamin D 1000 UNITS capsule      Take 1 capsule by mouth daily.

## 2024-11-24 DIAGNOSIS — J43.9 PULMONARY EMPHYSEMA, UNSPECIFIED EMPHYSEMA TYPE (H): ICD-10-CM

## 2024-11-24 RX ORDER — BUDESONIDE 1 MG/2ML
INHALANT ORAL
Qty: 360 ML | Refills: 3 | Status: SHIPPED | OUTPATIENT
Start: 2024-11-24

## 2024-12-11 DIAGNOSIS — J43.9 PULMONARY EMPHYSEMA, UNSPECIFIED EMPHYSEMA TYPE (H): ICD-10-CM

## 2024-12-11 RX ORDER — IPRATROPIUM BROMIDE AND ALBUTEROL SULFATE 2.5; .5 MG/3ML; MG/3ML
SOLUTION RESPIRATORY (INHALATION)
Qty: 720 ML | Refills: 2 | Status: SHIPPED | OUTPATIENT
Start: 2024-12-11

## 2025-01-02 ENCOUNTER — TELEPHONE (OUTPATIENT)
Dept: FAMILY MEDICINE | Facility: CLINIC | Age: 80
End: 2025-01-02
Payer: COMMERCIAL

## 2025-01-02 NOTE — TELEPHONE ENCOUNTER
PA Initiation    Medication: BUDESONIDE 1 MG/2ML IN SUSP  Insurance Company: [employer] - Phone [phone] Fax [phone]  Pharmacy Filling the Rx: [facility] - [place], [place] - [address] [place] [address]  Filling Pharmacy Phone: [phone]  Filling Pharmacy Fax: [phone]  Start Date: 1/2/2025

## 2025-01-02 NOTE — TELEPHONE ENCOUNTER
Prior Authorization Retail Medication Request    Medication/Dose: budesonide (PULMICORT) 1 MG/2ML neb solution  Diagnosis and ICD code (if different than what is on RX):  Pulmonary emphysema, unspecified emphysema type (H) [J43.9]   New/renewal/insurance change PA/secondary ins. PA:  Previously Tried and Failed:    Rationale:      Insurance   Primary: [employer]/ Medicare  Insurance ID:  [id number]    Secondary (if applicable):  Insurance ID:      Pharmacy Information (if different than what is on RX)  Name:    Phone:    Fax:    Clinic Information  Preferred routing pool for dept communication: [facility] pool

## 2025-01-29 ENCOUNTER — OFFICE VISIT (OUTPATIENT)
Dept: OPHTHALMOLOGY | Facility: CLINIC | Age: 80
End: 2025-01-29
Payer: COMMERCIAL

## 2025-01-29 DIAGNOSIS — H52.4 PRESBYOPIA: ICD-10-CM

## 2025-01-29 DIAGNOSIS — H43.813 POSTERIOR VITREOUS DETACHMENT OF BOTH EYES: ICD-10-CM

## 2025-01-29 DIAGNOSIS — Z01.01 ENCOUNTER FOR EXAMINATION OF EYES AND VISION WITH ABNORMAL FINDINGS: Primary | ICD-10-CM

## 2025-01-29 DIAGNOSIS — H26.492 POSTERIOR CAPSULAR OPACIFICATION VISUALLY SIGNIFICANT, LEFT EYE: ICD-10-CM

## 2025-01-29 DIAGNOSIS — Z96.1 PSEUDOPHAKIA: ICD-10-CM

## 2025-01-29 PROCEDURE — 66821 AFTER CATARACT LASER SURGERY: CPT | Mod: LT | Performed by: OPHTHALMOLOGY

## 2025-01-29 PROCEDURE — 92015 DETERMINE REFRACTIVE STATE: CPT | Performed by: OPHTHALMOLOGY

## 2025-01-29 PROCEDURE — 92014 COMPRE OPH EXAM EST PT 1/>: CPT | Mod: 25 | Performed by: OPHTHALMOLOGY

## 2025-01-29 ASSESSMENT — EXTERNAL EXAM - LEFT EYE: OS_EXAM: PROLAPSED FAT PADS: UPPER, LOWER

## 2025-01-29 ASSESSMENT — CONF VISUAL FIELD
OD_INFERIOR_TEMPORAL_RESTRICTION: 0
OD_INFERIOR_NASAL_RESTRICTION: 0
OD_SUPERIOR_TEMPORAL_RESTRICTION: 0
OS_INFERIOR_NASAL_RESTRICTION: 0
OD_NORMAL: 1
OS_INFERIOR_TEMPORAL_RESTRICTION: 0
OS_NORMAL: 1
OS_SUPERIOR_TEMPORAL_RESTRICTION: 0
OS_SUPERIOR_NASAL_RESTRICTION: 0
METHOD: COUNTING FINGERS
OD_SUPERIOR_NASAL_RESTRICTION: 0

## 2025-01-29 ASSESSMENT — REFRACTION_WEARINGRX
OD_ADD: +2.92
OS_ADD: +2.94
OS_SPHERE: -1.00
OD_CYLINDER: +1.00
OS_CYLINDER: +1.75
OD_AXIS: 023
OD_SPHERE: -2.00
SPECS_TYPE: PAL
OS_AXIS: 002

## 2025-01-29 ASSESSMENT — REFRACTION_MANIFEST
OD_SPHERE: -2.50
OS_SPHERE: -1.50
OD_ADD: +2.75
OD_AXIS: 006
OS_AXIS: 007
OS_ADD: +2.75
OS_CYLINDER: +1.75
OD_CYLINDER: +1.25

## 2025-01-29 ASSESSMENT — CUP TO DISC RATIO
OS_RATIO: 0.2
OD_RATIO: 0.5

## 2025-01-29 ASSESSMENT — TONOMETRY
IOP_METHOD: APPLANATION
OD_IOP_MMHG: 15
OS_IOP_MMHG: 14

## 2025-01-29 ASSESSMENT — VISUAL ACUITY
OD_CC+: -2
OS_CC: 20/70
OS_PH_CC+: -1
OD_PH_CC+: -1
METHOD: SNELLEN - LINEAR
OD_CC: 20/40
OD_PH_CC: 20/25
OS_CC+: -1
OS_PH_CC: 20/50

## 2025-01-29 ASSESSMENT — EXTERNAL EXAM - RIGHT EYE: OD_EXAM: PROLAPSED FAT PADS: UPPER, LOWER

## 2025-01-29 NOTE — PROGRESS NOTES
" Current Eye Medications:  None     Subjective:  Patient returns today for her annual diabetic eye health exam and new DMV form. She failed her driving test recently and wasn't wearing her glasses. Patient is noticing her vision in the left eye is getting \"blurry\" compared to the right. No changes in floaters. No flashing lights. She has no ocular pain or discomfort.      ( is in the hospital with cellulitis right now.)     Lab Results   Component Value Date   A1C 6.3 09/05/2024   A1C 6.6 04/15/2024   A1C 6.5 09/06/2023   A1C 6.3 08/29/2022   A1C 6.3 08/31/2021   A1C 6.4 02/15/2021   A1C 6.3 08/19/2020   A1C 6.3 02/18/2020   A1C 6.1 08/06/2019   A1C 6.1 01/23/2019      Objective:   see eye exam.     Assessment:  Visually significant posterior capsule opacity left eye; otherwise stable eye exam.      ICD-10-CM    1. Encounter for examination of eyes and vision with abnormal findings  Z01.01       2. Presbyopia  H52.4       3. Pseudophakia, ou; Yag Caps, od  Z96.1       4. Posterior capsular opacification visually significant, left eye  H26.492       5. Posterior vitreous detachment of both eyes  H43.813            Plan:  Yag Capsulotomy performed without problems left eye under Proparacaine anesthetic.  Well tolerated.  Number of applications: 18  Power of applications: 1.2 mJ  Iopidine given.    Davi Mosquera M.D.       "

## 2025-01-29 NOTE — LETTER
"1/29/2025      Kelsea Valentine  [address] [place] [address]      Dear Colleague,    Thank you for referring your patient, Kelsea Valentine, to the [facility]. Please see a copy of my visit note below.     Current Eye Medications:  None     Subjective:  Patient returns today for her annual diabetic eye health exam and new DMV form. She failed her driving test recently and wasn't wearing her glasses. Patient is noticing her vision in the left eye is getting \"blurry\" compared to the right. No changes in floaters. No flashing lights. She has no ocular pain or discomfort.      ( is in the hospital with cellulitis right now.)     Lab Results   Component Value Date   A1C 6.3 09/05/2024   A1C 6.6 04/15/2024   A1C 6.5 09/06/2023   A1C 6.3 08/29/2022   A1C 6.3 08/31/2021   A1C 6.4 02/15/2021   A1C 6.3 08/19/2020   A1C 6.3 02/18/2020   A1C 6.1 08/06/2019   A1C 6.1 01/23/2019      Objective:   see eye exam.     Assessment:  Visually significant posterior capsule opacity left eye; otherwise stable eye exam.      ICD-10-CM    1. Encounter for examination of eyes and vision with abnormal findings  Z01.01       2. Presbyopia  H52.4       3. Pseudophakia, ou; Yag Caps, od  Z96.1       4. Posterior capsular opacification visually significant, left eye  H26.492       5. Posterior vitreous detachment of both eyes  H43.813            Plan:  Yag Capsulotomy performed without problems left eye under Proparacaine anesthetic.  Well tolerated.  Number of applications: 18  Power of applications: 1.2 mJ  Iopidine given.    Davi Mosquera M.D.         Again, thank you for allowing me to participate in the care of your patient.        Sincerely,        Davi Mosquera MD    Electronically signed"

## 2025-01-29 NOTE — PATIENT INSTRUCTIONS
"Your eye may be slightly red, sore, and blurry for a few days.  You may notice some floaters for a few days.    May use artificial tears up to four times a day (like Refresh Optive, Systane Balance, or TheraTears. Avoid \"get the red out\" drops and generic artifical tears).     Return Visit in about 1 week for S/P YAG Cap left eye - intraocular pressure check and refraction.     Davi Mosquera M.D.  [phone]    "

## 2025-02-10 ENCOUNTER — DOCUMENTATION ONLY (OUTPATIENT)
Dept: OPHTHALMOLOGY | Facility: CLINIC | Age: 80
End: 2025-02-10
Payer: COMMERCIAL

## 2025-03-03 ENCOUNTER — OFFICE VISIT (OUTPATIENT)
Dept: FAMILY MEDICINE | Facility: CLINIC | Age: 80
End: 2025-03-03
Payer: COMMERCIAL

## 2025-03-03 VITALS
OXYGEN SATURATION: 96 % | HEART RATE: 61 BPM | RESPIRATION RATE: 20 BRPM | HEIGHT: 63 IN | BODY MASS INDEX: 25.98 KG/M2 | SYSTOLIC BLOOD PRESSURE: 126 MMHG | WEIGHT: 146.6 LBS | DIASTOLIC BLOOD PRESSURE: 74 MMHG | TEMPERATURE: 97.5 F

## 2025-03-03 DIAGNOSIS — J43.9 PULMONARY EMPHYSEMA, UNSPECIFIED EMPHYSEMA TYPE (H): ICD-10-CM

## 2025-03-03 DIAGNOSIS — E87.1 HYPONATREMIA: ICD-10-CM

## 2025-03-03 DIAGNOSIS — I42.2 HYPERTENSIVE HYPERTROPHIC CARDIOMYOPATHY, WITHOUT HEART FAILURE (H): ICD-10-CM

## 2025-03-03 DIAGNOSIS — I10 HYPERTENSION GOAL BP (BLOOD PRESSURE) < 140/90: ICD-10-CM

## 2025-03-03 DIAGNOSIS — R63.4 WEIGHT LOSS: ICD-10-CM

## 2025-03-03 DIAGNOSIS — N18.31 STAGE 3A CHRONIC KIDNEY DISEASE (H): ICD-10-CM

## 2025-03-03 DIAGNOSIS — E11.22 TYPE 2 DIABETES MELLITUS WITH STAGE 3A CHRONIC KIDNEY DISEASE, WITHOUT LONG-TERM CURRENT USE OF INSULIN (H): Primary | ICD-10-CM

## 2025-03-03 DIAGNOSIS — N18.31 TYPE 2 DIABETES MELLITUS WITH STAGE 3A CHRONIC KIDNEY DISEASE, WITHOUT LONG-TERM CURRENT USE OF INSULIN (H): Primary | ICD-10-CM

## 2025-03-03 DIAGNOSIS — I11.9 HYPERTENSIVE HYPERTROPHIC CARDIOMYOPATHY, WITHOUT HEART FAILURE (H): ICD-10-CM

## 2025-03-03 LAB
ANION GAP SERPL CALCULATED.3IONS-SCNC: 11 MMOL/L (ref 7–15)
BUN SERPL-MCNC: 15.6 MG/DL (ref 8–23)
CALCIUM SERPL-MCNC: 9.6 MG/DL (ref 8.8–10.4)
CHLORIDE SERPL-SCNC: 98 MMOL/L (ref 98–107)
CREAT SERPL-MCNC: 1.01 MG/DL (ref 0.51–0.95)
CREAT UR-MCNC: 48.5 MG/DL
EGFRCR SERPLBLD CKD-EPI 2021: 57 ML/MIN/1.73M2
EST. AVERAGE GLUCOSE BLD GHB EST-MCNC: 140 MG/DL
GLUCOSE SERPL-MCNC: 102 MG/DL (ref 70–99)
HBA1C MFR BLD: 6.5 % (ref 0–5.6)
HCO3 SERPL-SCNC: 23 MMOL/L (ref 22–29)
HGB BLD-MCNC: 14.2 G/DL (ref 11.7–15.7)
MICROALBUMIN UR-MCNC: 108 MG/L
MICROALBUMIN/CREAT UR: 222.68 MG/G CR (ref 0–25)
POTASSIUM SERPL-SCNC: 4.5 MMOL/L (ref 3.4–5.3)
SODIUM SERPL-SCNC: 132 MMOL/L (ref 135–145)
TSH SERPL DL<=0.005 MIU/L-ACNC: 1.63 UIU/ML (ref 0.3–4.2)

## 2025-03-03 PROCEDURE — 85018 HEMOGLOBIN: CPT | Performed by: FAMILY MEDICINE

## 2025-03-03 PROCEDURE — 82570 ASSAY OF URINE CREATININE: CPT | Performed by: FAMILY MEDICINE

## 2025-03-03 PROCEDURE — G2211 COMPLEX E/M VISIT ADD ON: HCPCS | Performed by: FAMILY MEDICINE

## 2025-03-03 PROCEDURE — 99214 OFFICE O/P EST MOD 30 MIN: CPT | Performed by: FAMILY MEDICINE

## 2025-03-03 PROCEDURE — 83036 HEMOGLOBIN GLYCOSYLATED A1C: CPT | Performed by: FAMILY MEDICINE

## 2025-03-03 PROCEDURE — 1126F AMNT PAIN NOTED NONE PRSNT: CPT | Performed by: FAMILY MEDICINE

## 2025-03-03 PROCEDURE — 82043 UR ALBUMIN QUANTITATIVE: CPT | Performed by: FAMILY MEDICINE

## 2025-03-03 PROCEDURE — 80048 BASIC METABOLIC PNL TOTAL CA: CPT | Performed by: FAMILY MEDICINE

## 2025-03-03 PROCEDURE — 3078F DIAST BP <80 MM HG: CPT | Performed by: FAMILY MEDICINE

## 2025-03-03 PROCEDURE — 84443 ASSAY THYROID STIM HORMONE: CPT | Performed by: FAMILY MEDICINE

## 2025-03-03 PROCEDURE — 3074F SYST BP LT 130 MM HG: CPT | Performed by: FAMILY MEDICINE

## 2025-03-03 PROCEDURE — 36415 COLL VENOUS BLD VENIPUNCTURE: CPT | Performed by: FAMILY MEDICINE

## 2025-03-03 RX ORDER — METOPROLOL SUCCINATE 100 MG/1
TABLET, EXTENDED RELEASE ORAL
Qty: 100 TABLET | Refills: 1 | Status: SHIPPED | OUTPATIENT
Start: 2025-03-03

## 2025-03-03 RX ORDER — SPIRONOLACTONE 25 MG/1
25 TABLET ORAL DAILY
Qty: 100 TABLET | Refills: 1 | Status: SHIPPED | OUTPATIENT
Start: 2025-03-03

## 2025-03-03 RX ORDER — ALBUTEROL SULFATE 90 UG/1
INHALANT RESPIRATORY (INHALATION)
Qty: 54 G | Refills: 2 | Status: SHIPPED | OUTPATIENT
Start: 2025-03-03

## 2025-03-03 RX ORDER — AMLODIPINE BESYLATE 5 MG/1
5 TABLET ORAL DAILY
Qty: 100 TABLET | Refills: 1 | Status: SHIPPED | OUTPATIENT
Start: 2025-03-03

## 2025-03-03 RX ORDER — IPRATROPIUM BROMIDE AND ALBUTEROL SULFATE 2.5; .5 MG/3ML; MG/3ML
1 SOLUTION RESPIRATORY (INHALATION) 2 TIMES DAILY
Qty: 720 ML | Refills: 2 | Status: SHIPPED | OUTPATIENT
Start: 2025-03-03

## 2025-03-03 ASSESSMENT — PAIN SCALES - GENERAL: PAINLEVEL_OUTOF10: NO PAIN (0)

## 2025-03-03 NOTE — LETTER
March 3, 2025      Kelsea Valentine  [address]   [place] [address]        Dear Klesea,    We are writing to inform you of your test results.    Your urine testing has improved, likely because your blood pressure is well controlled.  Your kidney function has improved too.  Keep up the good work!    Resulted Orders   Albumin Random Urine Quantitative with Creat Ratio   Result Value Ref Range    Creatinine Urine mg/dL 48.5 mg/dL      Comment:      The reference ranges have not been established in urine creatinine. The results should be integrated into the clinical context for interpretation.    Albumin Urine mg/L 108.0 mg/L      Comment:      The reference ranges have not been established in urine albumin. The results should be integrated into the clinical context for interpretation.    Albumin Urine mg/g Cr 222.68 (H) 0.00 - 25.00 mg/g Cr      Comment:      Microalbuminuria is defined as an albumin:creatinine ratio of 17 to 299 for males and 25 to 299 for females. A ratio of albumin:creatinine of 300 or higher is indicative of overt proteinuria.  Due to biologic variability, positive results should be confirmed by a second, first-morning random or 24-hour timed urine specimen. If there is discrepancy, a third specimen is recommended. When 2 out of 3 results are in the microalbuminuria range, this is evidence for incipient nephropathy and warrants increased efforts at glucose control, blood pressure control, and institution of therapy with an angiotensin-converting-enzyme (ACE) inhibitor (if the patient can tolerate it).     HEMOGLOBIN A1C   Result Value Ref Range    Estimated Average Glucose 140 (H) <117 mg/dL    Hemoglobin A1C 6.5 (H) 0.0 - 5.6 %      Comment:      Normal <5.7%   Prediabetes 5.7-6.4%    Diabetes 6.5% or higher     Note: Adopted from ADA consensus guidelines.   BASIC METABOLIC PANEL   Result Value Ref Range    Sodium 132 (L) 135 - 145 mmol/L    Potassium 4.5 3.4 - 5.3 mmol/L    Chloride  98 98 - 107 mmol/L    Carbon Dioxide (CO2) 23 22 - 29 mmol/L    Anion Gap 11 7 - 15 mmol/L    Urea Nitrogen 15.6 8.0 - 23.0 mg/dL    Creatinine 1.01 (H) 0.51 - 0.95 mg/dL    GFR Estimate 57 (L) >60 mL/min/1.73m2      Comment:      eGFR calculated using 2021 CKD-EPI equation.    Calcium 9.6 8.8 - 10.4 mg/dL    Glucose 102 (H) 70 - 99 mg/dL   Hemoglobin   Result Value Ref Range    Hemoglobin 14.2 11.7 - 15.7 g/dL   TSH with free T4 reflex   Result Value Ref Range    TSH 1.63 0.30 - 4.20 uIU/mL     If you have any questions or concerns, please call the clinic at the number listed above.     Sincerely,        Maritza Lopez MD/bmc    Electronically signed

## 2025-03-03 NOTE — PROGRESS NOTES
Assessment & Plan     (E11.22,  N18.31) Type 2 diabetes mellitus with stage 3a chronic kidney disease, without long-term current use of insulin (H)  (primary encounter diagnosis)  Comment: A1c up slightly, typical of her after winter  Plan: Albumin Random Urine Quantitative with Creat         Ratio, HEMOGLOBIN A1C, BASIC METABOLIC PANEL        Recheck 6 months, continue diet and activity    (I11.9,  I42.2) Hypertensive hypertrophic cardiomyopathy, without heart failure (H)  (I10) Hypertension goal BP (blood pressure) < 140/90  (N18.31) Stage 3a chronic kidney disease (H)  Comment: stable, doing well  Plan: Albumin Random Urine Quantitative with Creat         Ratio, Hemoglobin        amLODIPine (NORVASC) 5 MG tablet, metoprolol         succinate ER (TOPROL XL) 100 MG 24 hr tablet,         spironolactone (ALDACTONE) 25 MG tablet        Continue current meds, follows with cardiology as well.   Refill x 6 months     (E87.1) Hyponatremia  Comment: stable  Plan: spironolactone (ALDACTONE) 25 MG tablet         Refill x 6 months     (R63.4) Weight loss  Comment: pt not concerned. Has been eating healthier  Plan: TSH with free T4 reflex            (J43.9) Pulmonary emphysema, unspecified emphysema type (H)  Comment: stable  Plan: albuterol (PROAIR HFA/PROVENTIL HFA/VENTOLIN         HFA) 108 (90 Base) MCG/ACT inhaler, ipratropium        - albuterol 0.5 mg/2.5 mg/3 mL (DUONEB) 0.5-2.5        (3) MG/3ML neb solution         Refill x 6 months     The longitudinal plan of care for the diagnosis(es)/condition(s) as documented were addressed during this visit. Due to the added complexity in care, I will continue to support Kelsea in the subsequent management and with ongoing continuity of care.      Nicotine/Tobacco Cessation  She reports that she has been smoking cigarettes. She started smoking about 57 years ago. She has a 57.1 pack-year smoking history. She has never used smokeless tobacco.  Nicotine/Tobacco Cessation  "Plan  Information offered: Patient not interested at this time      BMI  Estimated body mass index is 26.39 kg/m  as calculated from the following:    Height as of this encounter: 1.588 m (5' 2.5\").    Weight as of this encounter: 66.5 kg (146 lb 9.6 oz).         See Patient Instructions    Subjective   Kelsea is a 80 year old, presenting for the following health issues:  Diabetes      3/3/2025     8:27 AM   Additional Questions   Roomed by Low     History of Present Illness       Diabetes:   She presents for follow up of diabetes.    She is not checking blood glucose.         She has no concerns regarding her diabetes at this time.  She is having weight loss.               Stressed due to spouse being in TCU after hospitalization.  CHF, pericardial effusion, cellulitis. Confusion and not the same person she knew.    Making an effort to eat better and reducing carbs. Pt is not worried about wt loss.  No night sweats,   Fam hx of hypothyroidism.    Breathing is at baseline, worsens with anxiety.                Review of Systems  Constitutional, HEENT, cardiovascular, pulmonary, gi and gu systems are negative, except as otherwise noted.      Objective    /74   Pulse 61   Temp 97.5  F (36.4  C) (Oral)   Resp 20   Ht 1.588 m (5' 2.5\")   Wt 66.5 kg (146 lb 9.6 oz)   SpO2 96%   BMI 26.39 kg/m    Body mass index is 26.39 kg/m .  Physical Exam   GENERAL: alert and no distress  EYES: Eyes grossly normal to inspection, PERRL and conjunctivae and sclerae normal  RESP: lungs clear to auscultation - no rales, rhonchi or wheezes  CV: regular rate and rhythm, normal S1 S2, no S3 or S4, no murmur, click or rub, no peripheral edema   MS: no gross musculoskeletal defects noted, no edema  SKIN: no suspicious lesions or rashes  PSYCH: mentation appears normal, affect normal/bright            Signed Electronically by: Maritza Lopez MD    "

## 2025-07-28 DIAGNOSIS — J43.9 PULMONARY EMPHYSEMA, UNSPECIFIED EMPHYSEMA TYPE (H): ICD-10-CM

## 2025-07-28 RX ORDER — BUDESONIDE 1 MG/2ML
1 INHALANT ORAL
Qty: 360 ML | Refills: 2 | OUTPATIENT
Start: 2025-07-28

## 2025-08-13 ENCOUNTER — PATIENT OUTREACH (OUTPATIENT)
Dept: CARE COORDINATION | Facility: CLINIC | Age: 80
End: 2025-08-13
Payer: COMMERCIAL